# Patient Record
Sex: FEMALE | Race: BLACK OR AFRICAN AMERICAN | Employment: OTHER | URBAN - METROPOLITAN AREA
[De-identification: names, ages, dates, MRNs, and addresses within clinical notes are randomized per-mention and may not be internally consistent; named-entity substitution may affect disease eponyms.]

---

## 2017-06-27 ENCOUNTER — HOSPITAL ENCOUNTER (OUTPATIENT)
Dept: PHYSICAL THERAPY | Age: 72
Discharge: HOME | End: 2017-06-27
Attending: INTERNAL MEDICINE
Payer: MEDICARE

## 2017-06-27 PROCEDURE — 97110 THERAPEUTIC EXERCISES: CPT

## 2017-06-27 PROCEDURE — G8979 MOBILITY GOAL STATUS: HCPCS

## 2017-06-27 PROCEDURE — 97162 PT EVAL MOD COMPLEX 30 MIN: CPT

## 2017-06-27 PROCEDURE — 97112 NEUROMUSCULAR REEDUCATION: CPT

## 2017-06-27 PROCEDURE — G8978 MOBILITY CURRENT STATUS: HCPCS

## 2017-06-27 NOTE — PROGRESS NOTES
PT DAILY TREATMENT NOTE - [facility]     Patient Name: Asher Cutting  Date:2017  : 1945  [x]  Patient  Verified  Payor: Ranjith Webbalysia / Plan: [place] & [place] / Product Type: Medicare /    In time:3:02  Out time:4:00  Total Treatment Time (min): 58  Total Timed Codes (min): 25  1:1 Treatment Time ( [address] only): 62   Visit #: 1 of 8    Treatment Area: Muscle weakness (generalized) [M62.81]    SUBJECTIVE  Pain Level (0-10 scale): 0/10  Any medication changes, allergies to medications, adverse drug reactions, diagnosis change, or new procedure performed?: [x] No    [] Yes (see summary sheet for update)  Subjective functional status/changes:   [] No changes reported  The patient has a chief complaint of L knee pain, weakness, and difficulty with walking due to fatigue. OBJECTIVE  33 min [x]Eval                  []Re-Eval     15 min Therapeutic Exercise:  [x] See flow sheet :   Rationale: increase ROM and increase strength to improve the patients ability to improve ADL ease. 10 min Neuromuscular Re-education:  [x]  See flow sheet : FT, TUG, 30\" sit to stand   Rationale: increase ROM and increase strength  to improve the patients ability to improve ADL ease.     With   [] TE   [] TA   [] neuro   [] other: Patient Education: [x] Review HEP    [] Progressed/Changed HEP based on:   [] positioning   [] body mechanics   [] transfers   [] heat/ice application    [] other:      Other Objective/Functional Measures:  See IE    Pain Level (0-10 scale) post treatment: 0/10    ASSESSMENT/Changes in Function: See POC    Patient will continue to benefit from skilled PT services to modify and progress therapeutic interventions, address functional mobility deficits, address ROM deficits, address strength deficits, analyze and address soft tissue restrictions, analyze and cue movement patterns, analyze and modify body mechanics/ergonomics, assess and modify postural abnormalities and instruct in home and community integration to attain remaining goals. [x]  See Plan of Care  []  See progress note/recertification  []  See Discharge Summary         Progress towards goals / Updated goals:  Short Term Goals: To be accomplished in 2 weeks:                         1. The patient will be independent and compliant with HEP to maximize therapeutic benefit. 2. The patient will improve TUG time to 15\" in order to maximize ease of transfer and gait. Long Term Goals: To be accomplished in 4 weeks:                         1. The patient will improve 30\" sit to stands to 5 to maximize efficiency of transfers. 2. The patient will improve TUG to 13\" to improve efficiency of gait during ADLs. 3. The patient will improve FOTO score to 64 to maximize quality of life.                          4. The patient will display tandem stance without LOB to 30\" in order to reduce risk of falls.     PLAN  []  Upgrade activities as tolerated     [x]  Continue plan of care  []  Update interventions per flow sheet       []  Discharge due to:_  []  Other:_      Jennifer Alexis, PT 6/27/2017  5:42 PM    Future Appointments  Date Time Provider Ashu Donato   7/5/2017 12:00 PM MICHAEL LOPEZ [facility]   7/11/2017 11:30 AM Scheryl Pancake, PT [facility]   7/13/2017 11:30 AM Sharee Fus, PTA [facility]   7/18/2017 11:30 AM Scheryl Pancake, PT [facility]   7/20/2017 11:30 AM Alianza Fus, PTA [facility]   7/25/2017 11:30 AM Scheryl Pancake, PT [facility]   7/27/2017 11:30 AM Alianza Fus, PTA [facility]

## 2017-06-27 NOTE — PROGRESS NOTES
[facility]  [address]  [place], [address]  [phone] fax    Plan of Care/ Statement of Necessity for Physical Therapy Services    Patient name: Tej Jarvis Start of Care: 2017   Referral source: Jazminemicky, Not On File, MD : 1945    Medical Diagnosis: Muscle weakness (generalized) [M62.81]   Onset Date: Chronic    Treatment Diagnosis: Generalized muscle weakness   Prior Hospitalization: see medical history Provider#: [id number]   Medications: Verified on Patient summary List    Comorbidities: HTN, Hearing Impairment, Stroke, Bunionectomy, Heart Disease   Prior Level of Function: The patient states that she has had chronic difficulty with ambulation around home and short distances due to muscle fatigue and weakness. The Plan of Care and following information is based on the information from the initial evaluation. Assessment/ key information: The patient is a 67year old female with a chief complaint weakness that is noted when walking for any length of time. She uses a [facility] for her primary AD during ambulation in the home and community. She does note falls in her home, but have been against furniture up to his point and she notes that have not been severe. The patient has signs and symptoms consistent with generalized weakness as well as a risk of falls with associated impairments consisting of pain, decreased strength, and risk of falls. She will benefit from skilled PT in order to address the above impairments. Evaluation Complexity History MEDIUM  Complexity : 1-2 comorbidities / personal factors will impact the outcome/ POC ; Examination MEDIUM Complexity : 3 Standardized tests and measures addressing body structure, function, activity limitation and / or participation in recreation  ;Presentation MEDIUM Complexity : Evolving with changing characteristics  ; Clinical Decision Making MEDIUM Complexity : FOTO score of 26-74  Overall Complexity Rating: MEDIUM  Problem List: pain affecting function, decrease strength, impaired gait/ balance, decrease ADL/ functional abilitiies, decrease activity tolerance, decrease flexibility/ joint mobility and decrease transfer abilities   Treatment Plan may include any combination of the following: Therapeutic exercise, Therapeutic activities, Neuromuscular re-education, Physical agent/modality, Manual therapy and Patient education  Patient / Family readiness to learn indicated by: asking questions, trying to perform skills and interest  Persons(s) to be included in education: patient (P)  Barriers to Learning/Limitations: None  Patient Goal (s): improve balance  Patient Self Reported Health Status: fair  Rehabilitation Potential: fair    Short Term Goals: To be accomplished in 2 weeks:   1. The patient will be independent and compliant with HEP to maximize therapeutic benefit. 2. The patient will improve TUG time to 15\" in order to maximize ease of transfer and gait. Long Term Goals: To be accomplished in 4 weeks:   1. The patient will improve 30\" sit to stands to 5 to maximize efficiency of transfers. 2. The patient will improve TUG to 13\" to improve efficiency of gait during ADLs. 3. The patient will improve FOTO score to 64 to maximize quality of life. 4. The patient will display tandem stance without LOB to 30\" in order to reduce risk of falls. Frequency / Duration: Patient to be seen 2 times per week for 4 weeks.     Patient/ Caregiver education and instruction: Diagnosis, prognosis, self care, activity modification and exercises   [x]  Plan of care has been reviewed with PTA    G-Codes (GP)  Mobility   Current  CK= 40-59%   Goal  CJ= 35-78%    Medicare Cert period: 3/12/7966 - 8/27/2017    Bill Adan, PT 6/27/2017 4:56 PM    ________________________________________________________________________    I certify that the above Therapy Services are being furnished while the patient is under my care. I agree with the treatment plan and certify that this therapy is necessary.     [de-identified] Signature:____________________  Date:____________Time: _________    Please sign and return to [facility] [address]  [place], [facility] [address]  [phone] fax

## 2017-07-11 ENCOUNTER — HOSPITAL ENCOUNTER (OUTPATIENT)
Dept: PHYSICAL THERAPY | Age: 72
Discharge: HOME | End: 2017-07-11
Attending: INTERNAL MEDICINE
Payer: MEDICARE

## 2017-07-11 PROCEDURE — 97112 NEUROMUSCULAR REEDUCATION: CPT

## 2017-07-11 PROCEDURE — 97110 THERAPEUTIC EXERCISES: CPT

## 2017-07-11 NOTE — PROGRESS NOTES
PT DAILY TREATMENT NOTE - [facility]     Patient Name: Lencho Wayne  Date:2017  : 1945  [x]  Patient  Verified  Payor: Aleena  / Plan: [place] MEDICARE PART A & B / Product Type: Medicare /    In time:11:30  Out time:12:05  Total Treatment Time (min): 35  Total Timed Codes (min): 35  1:1 Treatment Time ([facility] only): 35   Visit #: 2 of 8    Treatment Area: Muscle weakness (generalized) [M62.81]    SUBJECTIVE  Pain Level (0-10 scale): 0/10  Any medication changes, allergies to medications, adverse drug reactions, diagnosis change, or new procedure performed?: [x] No    [] Yes (see summary sheet for update)  Subjective functional status/changes:   [] No changes reported  The patient states that she has been performing the exercises as home when she can.     OBJECTIVE    Modality rationale:  to improve the patients ability to    Min Type Additional Details    [] Estim:  []Unatt       []IFC  []Premod                        []Other:  []w/ice   []w/heat  Position:  Location:    [] Estim: []Att    []TENS instruct  []NMES                    []Other:  []w/US   []w/ice   []w/heat  Position:  Location:    []  Traction: [] Cervical       []Lumbar                       [] Prone          []Supine                       []Intermittent   []Continuous Lbs:  [] before manual  [] after manual    []  Ultrasound: []Continuous   [] Pulsed                           []1MHz   []3MHz W/cm2:  Location:    []  Iontophoresis with dexamethasone         Location: [] Take home patch   [] In clinic    []  Ice     []  heat  []  Ice massage  []  Laser   []  Anodyne Position:  Location:    []  Laser with stim  []  Other:  Position:  Location:    []  Vasopneumatic Device Pressure:       [] lo [] med [] hi   Temperature: [] lo [] med [] hi   [] Skin assessment post-treatment:  []intact []redness- no adverse reaction    []redness  adverse reaction:      min []Eval                  []Re-Eval       25 min Therapeutic Exercise:  [] See flow sheet : Rationale: increase ROM and increase strength to improve the patients ability to improve ADL ease     min Therapeutic Activity:  []  See flow sheet :   Rationale:   to improve the patients ability to      10 min Neuromuscular Re-education:  []  See flow sheet :   Rationale: improve coordination, improve balance and increase proprioception  to improve the patients ability to improve ADL ease. min Manual Therapy:     Rationale:  to      min Gait Training:  ___ feet with ___ device on level surfaces with ___ level of assist   Rationale: With   [] TE   [] TA   [] neuro   [] other: Patient Education: [x] Review HEP    [] Progressed/Changed HEP based on:   [] positioning   [] body mechanics   [] transfers   [] heat/ice application    [] other:      Other Objective/Functional Measures:   30\" sit to stand chair rise test: 6 times  Pt ambulated 500' with SPC; fatigued following exercises. Pain Level (0-10 scale) post treatment: 0/10    ASSESSMENT/Changes in Function: Good improvement with regard to transfer efficiency with fair compliance of HEP up to this point. Patient will continue to benefit from skilled PT services to modify and progress therapeutic interventions, address functional mobility deficits, address ROM deficits, address strength deficits, analyze and address soft tissue restrictions, analyze and cue movement patterns, analyze and modify body mechanics/ergonomics, assess and modify postural abnormalities, address imbalance/dizziness and instruct in home and community integration to attain remaining goals. []  See Plan of Care  []  See progress note/recertification  []  See Discharge Summary         Progress towards goals / Updated goals:  Short Term Goals: To be accomplished in 2 weeks:                         1. The patient will be independent and compliant with HEP to maximize therapeutic benefit. Mostly met - 7/11/2017                         2.  The patient will improve TUG time to 15\" in order to maximize ease of transfer and gait. Long Term Goals: To be accomplished in 4 weeks:                         1. The patient will improve 30\" sit to stands to 5 to maximize efficiency of transfers. Met 6 times 7/11/2017                         2. The patient will improve TUG to 13\" to improve efficiency of gait during ADLs.                        0. The patient will improve FOTO score to 64 to maximize quality of life.                         4. The patient will display tandem stance without LOB to 30\" in order to reduce risk of falls.     PLAN  []  Upgrade activities as tolerated     [x]  Continue plan of care  []  Update interventions per flow sheet       []  Discharge due to:_  []  Other:_      Livan Almeida, PT 7/11/2017  1:46 PM    Future Appointments  Date Time Provider Ashu Donato   7/13/2017 11:30 AM [facility]   7/18/2017 11:30 AM [facility]   7/20/2017 11:30 AM [facility], [facility]   7/25/2017 11:30 AM [facility]   7/27/2017 11:30 AM [facility]

## 2017-07-13 ENCOUNTER — HOSPITAL ENCOUNTER (OUTPATIENT)
Dept: PHYSICAL THERAPY | Age: 72
Discharge: HOME | End: 2017-07-13
Attending: INTERNAL MEDICINE
Payer: MEDICARE

## 2017-07-13 PROCEDURE — 97116 GAIT TRAINING THERAPY: CPT

## 2017-07-13 PROCEDURE — 97112 NEUROMUSCULAR REEDUCATION: CPT

## 2017-07-13 NOTE — PROGRESS NOTES
PT DAILY TREATMENT NOTE - [facility]     Patient Name: Maxime Matt  Date:2017  : 1945  [x]  Patient  Verified  Payor: Negro Horne / Plan: [place] MEDICARE PART A & B / Product Type: Medicare /    In time:11:55  Out time:12:24  Total Treatment Time (min): 29  Total Timed Codes (min): 29  1:1 Treatment Time ( [address] only): 29   Visit #: 3 of 8    Treatment Area: Muscle weakness (generalized) [M62.81]    SUBJECTIVE  Pain Level (0-10 scale): 0/10  Any medication changes, allergies to medications, adverse drug reactions, diagnosis change, or new procedure performed?: [x] No    [] Yes (see summary sheet for update)  Subjective functional status/changes:   [] No changes reported  Pt reports no pain. Pt reports no other complaints today. Pt reports compliance with HEP. OBJECTIVE    21 min Neuromuscular Re-education:  [x]  See flow sheet :   Rationale: improve coordination and improve balance  to improve the patients ability to tolerate functional activities. 8 min Gait Trainin  feet with no device on level surfaces with CGA level of assist   Rationale: With   [] TE   [] TA   [] neuro   [] other: Patient Education: [x] Review HEP    [] Progressed/Changed HEP based on:   [] positioning   [] body mechanics   [] transfers   [] heat/ice application    [] other:      Other Objective/Functional Measures: No LOB with standing balance and gait. Pain Level (0-10 scale) post treatment: 0/10    ASSESSMENT/Changes in Function: Pt demonstrates improved proprioception and functional mobility. Patient will continue to benefit from skilled PT services to modify and progress therapeutic interventions, address functional mobility deficits, address ROM deficits, address strength deficits, analyze and address soft tissue restrictions, analyze and cue movement patterns and analyze and modify body mechanics/ergonomics to attain remaining goals.      []  See Plan of Care  []  See progress note/recertification  []  See Discharge Summary         Progress towards goals / Updated goals:  Short Term Goals: To be accomplished in 2 weeks:                         1. The patient will be independent and compliant with HEP to maximize therapeutic benefit. Mostly met - 7/11/2017                         2. The patient will improve TUG time to 15\" in order to maximize ease of transfer and gait. Long Term Goals: To be accomplished in 4 weeks:                         1. The patient will improve 30\" sit to stands to 5 to maximize efficiency of transfers. Met 6 times 7/11/2017                         2. The patient will improve TUG to 13\" to improve efficiency of gait during ADLs.                        7. The patient will improve FOTO score to 64 to maximize quality of life.                         4. The patient will display tandem stance without LOB to 30\" in order to reduce risk of falls.     PLAN  []  Upgrade activities as tolerated     [x]  Continue plan of care  []  Update interventions per flow sheet       []  Discharge due to:_  []  Other:_      David Engle PTA 7/13/2017  12:03 PM    Future Appointments  Date Time Provider Ashu Donato   7/18/2017 11:30 AM Mihai Lazo PT [facility]   7/20/2017 11:30 AM [facility]   7/25/2017 11:30 AM [facility]   7/27/2017 11:30 AM David Engle PTA [facility]

## 2017-07-18 ENCOUNTER — HOSPITAL ENCOUNTER (OUTPATIENT)
Dept: PHYSICAL THERAPY | Age: 72
Discharge: HOME | End: 2017-07-18
Attending: INTERNAL MEDICINE
Payer: MEDICARE

## 2017-07-18 PROCEDURE — 97110 THERAPEUTIC EXERCISES: CPT

## 2017-07-18 PROCEDURE — 97112 NEUROMUSCULAR REEDUCATION: CPT

## 2017-07-18 NOTE — PROGRESS NOTES
PT DAILY TREATMENT NOTE - [facility]     Patient Name: Kurt King  Date:2017  : 1945  [x]  Patient  Verified  Payor: Jhoan Mcknight / Plan: [place] MEDICARE PART A & B / Product Type: Medicare /    In time:11:30  Out time:12:08  Total Treatment Time (min): 38  Total Timed Codes (min): 38  1:1 Treatment Time ( [address] only): 38   Visit #: 4 of 8    Treatment Area: Muscle weakness (generalized) [M62.81]    SUBJECTIVE  Pain Level (0-10 scale): 4/10  Any medication changes, allergies to medications, adverse drug reactions, diagnosis change, or new procedure performed?: [x] No    [] Yes (see summary sheet for update)  Subjective functional status/changes:   [] No changes reported  The patient states that she still is struggling with her balance. OBJECTIVE  10 min Therapeutic Exercise:  [x] See flow sheet :   Rationale: increase ROM and increase strength to improve the patients ability to improve ADL ease. 28 min Neuromuscular Re-education:  [x]  See flow sheet : balance exercises   Rationale: improve coordination, improve balance and increase proprioception  to improve the patients ability to improve ADL ease. With   [] TE   [] TA   [] neuro   [] other: Patient Education: [x] Review HEP    [] Progressed/Changed HEP based on:   [] positioning   [] body mechanics   [] transfers   [] heat/ice application    [] other:      Other Objective/Functional Measures:   TU\"  Progressed to 500' ambulation void of AD      Pain Level (0-10 scale) post treatment: 0/10    ASSESSMENT/Changes in Function: Met goals related to transfers and ambulation speed (TUG). The patient is progressing.     Patient will continue to benefit from skilled PT services to modify and progress therapeutic interventions, address functional mobility deficits, address ROM deficits, address strength deficits, analyze and address soft tissue restrictions, analyze and cue movement patterns, analyze and modify body mechanics/ergonomics, assess and modify postural abnormalities and instruct in home and community integration to attain remaining goals. []  See Plan of Care  []  See progress note/recertification  []  See Discharge Summary         Progress towards goals / Updated goals:  Short Term Goals: To be accomplished in 2 weeks:                         3. The patient will be independent and compliant with HEP to maximize therapeutic benefit. Mostly met - 7/11/2017                         2. The patient will improve TUG time to 15\" in order to maximize ease of transfer and gait. Long Term Goals: To be accomplished in 4 weeks:                         1. The patient will improve 30\" sit to stands to 5 to maximize efficiency of transfers.  Met 6 times 7/11/2017                         2. The patient will improve TUG to 13\" to improve efficiency of gait during ADLs. Not met 13\" 7/18/2017                         3. The patient will improve FOTO score to 64 to maximize quality of life.                          4. The patient will display tandem stance without LOB to 30\" in order to reduce risk of falls.      PLAN  []  Upgrade activities as tolerated     [x]  Continue plan of care  []  Update interventions per flow sheet       []  Discharge due to:_  []  Other:_      Suman Richard PT 7/18/2017  1:24 PM    Future Appointments  Date Time Provider Ashu Donato   7/20/2017 11:30 AM Florence Bravo PTA [facility]   7/25/2017 11:30 AM Suman Richard PT [facility]   7/27/2017 11:30 AM Florence Bravo PTA [facility]

## 2017-07-20 ENCOUNTER — HOSPITAL ENCOUNTER (OUTPATIENT)
Dept: PHYSICAL THERAPY | Age: 72
Discharge: HOME | End: 2017-07-20
Attending: INTERNAL MEDICINE
Payer: MEDICARE

## 2017-07-20 PROCEDURE — 97112 NEUROMUSCULAR REEDUCATION: CPT

## 2017-07-20 PROCEDURE — 97110 THERAPEUTIC EXERCISES: CPT

## 2017-07-20 NOTE — PROGRESS NOTES
PT DAILY TREATMENT NOTE - [facility]     Patient Name: Frannie Hilario  Date:2017  : 1945  [x]  Patient  Verified  Payor: [place] MEDICARE / Plan: [place] MEDICARE PART A & B / Product Type: Medicare /    In time:11:30  Out time:12:00  Total Treatment Time (min): 30  Total Timed Codes (min): 30  1:1 Treatment Time ( [address] only): 30   Visit #: 5 of 8    Treatment Area: Muscle weakness (generalized) [M62.81]    SUBJECTIVE  Pain Level (0-10 scale): 3-4/10  Any medication changes, allergies to medications, adverse drug reactions, diagnosis change, or new procedure performed?: [x] No    [] Yes (see summary sheet for update)  Subjective functional status/changes:   [] No changes reported  Pt reports no new complaints. Pt reports compliance with HEP. OBJECTIVE    20 min Therapeutic Exercise:  [] See flow sheet :   Rationale: increase ROM and increase strength to improve the patients ability to tolerate ADLs. 10 min Neuromuscular Re-education:  []  See flow sheet :   Rationale: increase strength, improve coordination and increase proprioception  to improve the patients ability to tolerate ADLs          With   [] TE   [] TA   [] neuro   [] other: Patient Education: [x] Review HEP    [] Progressed/Changed HEP based on:   [] positioning   [] body mechanics   [] transfers   [] heat/ice application    [] other:      Other Objective/Functional Measures: Pt demonstrates no gait deviations when ambulating without AD required. Pain Level (0-10 scale) post treatment: 0/10    ASSESSMENT/Changes in Function: Pt demonstrates improved gait mechanics and proprioception but continues to demonstrate a lack of motivation.      Patient will continue to benefit from skilled PT services to modify and progress therapeutic interventions, address functional mobility deficits, address ROM deficits, address strength deficits, analyze and address soft tissue restrictions, analyze and cue movement patterns and analyze and modify body mechanics/ergonomics to attain remaining goals. []  See Plan of Care  []  See progress note/recertification  []  See Discharge Summary         Progress towards goals / Updated goals:  Short Term Goals: To be accomplished in 2 weeks:                         2. The patient will be independent and compliant with HEP to maximize therapeutic benefit. Mostly met - 7/11/2017                         2. The patient will improve TUG time to 15\" in order to maximize ease of transfer and gait. Long Term Goals: To be accomplished in 4 weeks:                         1. The patient will improve 30\" sit to stands to 5 to maximize efficiency of transfers.  Met 6 times 7/11/2017                         2. The patient will improve TUG to 13\" to improve efficiency of gait during ADLs. Not met 13\" 7/18/2017                         3. The patient will improve FOTO score to 64 to maximize quality of life.                          4. The patient will display tandem stance without LOB to 30\" in order to reduce risk of falls.      PLAN  []  Upgrade activities as tolerated     [x]  Continue plan of care  []  Update interventions per flow sheet       []  Discharge due to:_  []  Other:_      Shannon Julien PTA 7/20/2017  11:49 AM    Future Appointments  Date Time Provider Ashu Donato   7/25/2017 11:30 AM Madelyn Pierce, PT [facility]   7/27/2017 11:30 AM [facility]

## 2017-07-25 ENCOUNTER — HOSPITAL ENCOUNTER (OUTPATIENT)
Dept: PHYSICAL THERAPY | Age: 72
Discharge: HOME | End: 2017-07-25
Attending: INTERNAL MEDICINE
Payer: MEDICARE

## 2017-07-25 PROCEDURE — 97112 NEUROMUSCULAR REEDUCATION: CPT

## 2017-07-25 PROCEDURE — 97110 THERAPEUTIC EXERCISES: CPT

## 2017-07-27 ENCOUNTER — HOSPITAL ENCOUNTER (OUTPATIENT)
Dept: PHYSICAL THERAPY | Age: 72
Discharge: HOME | End: 2017-07-27
Attending: INTERNAL MEDICINE
Payer: MEDICARE

## 2017-07-27 PROCEDURE — G8979 MOBILITY GOAL STATUS: HCPCS

## 2017-07-27 PROCEDURE — 97116 GAIT TRAINING THERAPY: CPT

## 2017-07-27 PROCEDURE — G8980 MOBILITY D/C STATUS: HCPCS

## 2017-07-27 PROCEDURE — 97110 THERAPEUTIC EXERCISES: CPT

## 2017-07-27 NOTE — PROGRESS NOTES
PT DAILY TREATMENT NOTE - [facility]     Patient Name: Claudette Mail  Date:2017  : 1945  [x]  Patient  Verified  Payor: [address] / Plan: [place] & [place] / Product Type: Medicare /    In time:11:30  Out time:12:00  Total Treatment Time (min): 30  Total Timed Codes (min): 30  1:1 Treatment Time ([address] only): 30   Visit #: 7 of 8    Treatment Area: Muscle weakness (generalized) [M62.81]    SUBJECTIVE  Pain Level (0-10 scale): 0/10  Any medication changes, allergies to medications, adverse drug reactions, diagnosis change, or new procedure performed?: [x] No    [] Yes (see summary sheet for update)  Subjective functional status/changes:   [] No changes reported  Pt reports no new complaints. Pt reports having a cold today. OBJECTIVE    22 min Therapeutic Exercise:  [x] See flow sheet :   Rationale: increase ROM and increase strength to improve the patients ability to tolerate ADLs. 8 min Gait Trainin feet with no device on level surfaces with SBA level of assist   Rationale: With   [] TE   [] TA   [] neuro   [] other: Patient Education: [x] Review HEP    [] Progressed/Changed HEP based on:   [] positioning   [] body mechanics   [] transfers   [] heat/ice application    [] other:      Other Objective/Functional Measures: tandem stance performed with 2 LOB but was able to self correct. Pain Level (0-10 scale) post treatment: 0/10    ASSESSMENT/Changes in Function: Pt has met or made significant progress toward all functional goals. Issued and reviewed DC instructions with patient. []  See Plan of Care  []  See progress note/recertification  [x]  See Discharge Summary         Progress towards goals / Updated goals:  Short Term Goals: To be accomplished in 2 weeks:                         9. The patient will be independent and compliant with HEP to maximize therapeutic benefit. Mostly met - 2017                         2.  The patient will improve TUG time to 15\" in order to maximize ease of transfer and gait. Met - 13\" 7/18/017  Long Term Goals: To be accomplished in 4 weeks:                         9. The patient will improve 30\" sit to stands to 5 to maximize efficiency of transfers.  Met 6 times 7/11/2017                         2. The patient will improve TUG to 13\" to improve efficiency of gait during ADLs. Met 13\" 7/18/2017                         3. The patient will improve FOTO score to 64 to maximize quality of life. Progressed to 56 7/25/2017                         4. The patient will display tandem stance without LOB to 30\" in order to reduce risk of falls. - near met. 2 LOB with tandem stance patient was able to self correct. 07/27/17    PLAN  []  Upgrade activities as tolerated     [x]  Continue plan of care  []  Update interventions per flow sheet       []  Discharge due to:_  []  Other:_      Freya Meade, PTA 7/27/2017  11:49 AM    No future appointments.

## 2017-08-08 NOTE — PROGRESS NOTES
[facility]  [address]  [place], [address]  [phone] fax    Physical Therapy Discharge Summary  Patient name: Gerhardt Farber Start of Care: 2017   Referral source: [facility], Not On File, MD : 1945                          Medical Diagnosis: Muscle weakness (generalized) [M62.81] Onset Date: Chronic                          Treatment Diagnosis: Generalized muscle weakness   Prior Hospitalization: see medical history Provider#: [id number]   Medications: Verified on Patient summary List    Comorbidities: HTN, Hearing Impairment, Stroke, Bunionectomy, Heart Disease   Prior Level of Function: The patient states that she has had chronic difficulty with ambulation around home and short distances due to muscle fatigue and weakness. Visits from Start of Care: 7    Missed Visits: 2  Reporting Period : 2017 to 2017    Summary of Care:    G-Codes (GP)  Mobility    Goal  CK= 40-59%  D/C  CK= 40-59%      The severity rating is based on clinical judgment and the FOTO score. ASSESSMENT/RECOMMENDATIONS:  Pt has met or made significant progress toward all functional goals. Issued and reviewed DC instructions with patient.      [x]Discontinue therapy: [x]Patient has reached or is progressing toward set goals      []Patient is non-compliant or has abdicated      []Due to lack of appreciable progress towards set [address], PT 2017 7:16 AM

## 2019-03-22 ENCOUNTER — HOSPITAL ENCOUNTER (EMERGENCY)
Age: 74
Discharge: HOME | End: 2019-03-22
Attending: EMERGENCY MEDICINE
Payer: MEDICARE

## 2019-03-22 ENCOUNTER — APPOINTMENT (OUTPATIENT)
Dept: GENERAL RADIOLOGY | Age: 74
End: 2019-03-22
Attending: NURSE PRACTITIONER
Payer: MEDICARE

## 2019-03-22 VITALS
OXYGEN SATURATION: 98 % | WEIGHT: 200 LBS | RESPIRATION RATE: 18 BRPM | DIASTOLIC BLOOD PRESSURE: 108 MMHG | HEART RATE: 98 BPM | SYSTOLIC BLOOD PRESSURE: 164 MMHG | BODY MASS INDEX: 32.14 KG/M2 | HEIGHT: 66 IN | TEMPERATURE: 98.3 F

## 2019-03-22 DIAGNOSIS — R03.0 ELEVATED BLOOD PRESSURE READING: ICD-10-CM

## 2019-03-22 DIAGNOSIS — M25.462 EFFUSION OF LEFT KNEE: ICD-10-CM

## 2019-03-22 DIAGNOSIS — S89.92XA INJURY OF LEFT KNEE, INITIAL ENCOUNTER: Primary | ICD-10-CM

## 2019-03-22 PROCEDURE — 73562 X-RAY EXAM OF KNEE 3: CPT

## 2019-03-22 PROCEDURE — 74011250637 HC RX REV CODE- 250/637: Performed by: NURSE PRACTITIONER

## 2019-03-22 PROCEDURE — 99284 EMERGENCY DEPT VISIT MOD MDM: CPT

## 2019-03-22 RX ORDER — ACETAMINOPHEN 500 MG
500 TABLET ORAL
Status: COMPLETED | OUTPATIENT
Start: 2019-03-22 | End: 2019-03-22

## 2019-03-22 RX ORDER — ACETAMINOPHEN 500 MG
500 TABLET ORAL
Qty: 20 TAB | Refills: 0 | Status: SHIPPED | OUTPATIENT
Start: 2019-03-22 | End: 2020-04-13

## 2019-03-22 RX ADMIN — ACETAMINOPHEN 500 MG: 500 TABLET ORAL at 18:09

## 2019-03-22 NOTE — ED PROVIDER NOTES
EMERGENCY DEPARTMENT HISTORY AND PHYSICAL EXAM 
 
5:12 PM 
 
 
Date: 3/22/2019 Patient Name: Reta Solo History of Presenting Illness Chief Complaint Patient presents with  Knee Pain History Provided By: Patient Additional History (Context): 5:12 PM Reta Solo is a 75 y.o. female with h/o HTN and HLD who presents to ED complaining of left knee pain onset 2 days. Patient states that today she was trying to get out of the tub and had trouble bearing weight on the left leg due to the pain. She reports having knee pain and swelling for 2 days since a mechanical fall. No other concerns or symptoms at this time. PCP: Adamaris Monique MD 
 
Chief Complaint: knee pain Duration:  Days Timing:  Worsening Location: left knee Quality: not reported Severity: not reported Modifying Factors: exacerbated with bearing weight Associated Symptoms: knee swelling Current Outpatient Medications Medication Sig Dispense Refill  acetaminophen (TYLENOL EXTRA STRENGTH) 500 mg tablet Take 1 Tab by mouth every six (6) hours as needed for Pain. 20 Tab 0  
 lisinopril (PRINIVIL, ZESTRIL) 40 mg tablet TAKE ONE TABLET BY MOUTH EVERY DAY 90 Tab 3  
 amLODIPine (NORVASC) 10 mg tablet Take 1 Tab by mouth daily. 90 Tab 3  
 apixaban (ELIQUIS) 5 mg tablet Take 1 Tab by mouth two (2) times a day. 180 Tab 3  carvedilol (COREG) 25 mg tablet Take 1 Tab by mouth two (2) times daily (with meals). 180 Tab 3  
 doxazosin (CARDURA) 8 mg tablet Take 1 Tab by mouth two (2) times a day. 180 Tab 6  
 rosuvastatin (CRESTOR) 5 mg tablet Take 1 Tab by mouth nightly. 90 Tab 3  
 aspirin delayed-release (ASPIRIN LOW DOSE) 81 mg tablet Take 1 Tab by mouth daily. 100 Tab 2 Past History Past Medical History: 
Past Medical History:  
Diagnosis Date  Atrial fibrillation ([address]   
 10/11 per Dr Stuart Hernandez note; converted to [place] and of Mult[place] for 3 months already and staying in [place]  
  Cerebrovascular accident ([facility] [address] 10/2011  
 left mu; improved almost completely  Essential hypertension  Essential hypertension, benign  Hard of hearing 3/20/2015  Other and unspecified hyperlipidemia LDLs are at goal, triglycerides are at goal, HDLs are at goal  
 
 
Past Surgical History: 
Past Surgical History:  
Procedure Laterality Date  HX HYSTERECTOMY    
 total  
 
 
Family History: 
Family History Problem Relation Age of Onset  High Cholesterol Sister  Hypertension Other   
     family history of essential hypertension  Heart Attack Neg Hx  Sudden Death Neg Hx Social History: 
Social History Tobacco Use  Smoking status: Never Smoker  Smokeless tobacco: Never Used Substance Use Topics  Alcohol use: No  
  Comment: occasional, quit  Drug use: No  
 
 
Allergies: Allergies Allergen Reactions  Tetanus Vaccines And Toxoid Swelling  Penicillins Swelling Review of Systems Review of Systems Constitutional: Negative for appetite change and fever. HENT: Negative for congestion, rhinorrhea and sore throat. Respiratory: Negative for cough, shortness of breath and wheezing. Cardiovascular: Negative for chest pain and leg swelling. Gastrointestinal: Negative for abdominal pain, constipation, diarrhea, nausea and vomiting. Genitourinary: Negative for dysuria. Musculoskeletal: Negative for arthralgias and back pain. Left knee pain Left knee swelling Skin: Negative for color change (no erythema). Neurological: Negative for dizziness, syncope and headaches. All other systems reviewed and are negative. Physical Exam  
 
Visit Vitals BP (!) 164/108 (BP 1 Location: Left arm, BP Patient Position: At rest) Pulse 98 Temp 98.3 °F (36.8 °C) Resp 18 Ht 5' 6\" (1.676 m) Wt 90.7 kg (200 lb) SpO2 98% BMI 32.28 kg/m² Physical Exam  
 Constitutional: She is oriented to person, place, and time. No distress. Patient is very hard of hearing Patient appears in no distress HENT:  
Mouth/Throat: Oropharynx is clear and moist.  
Cardiovascular: Normal rate, regular rhythm and intact distal pulses. Pulses: 
     Dorsalis pedis pulses are 2+ on the right side, and 2+ on the left side. Pulmonary/Chest: Effort normal and breath sounds normal. No respiratory distress. She has no wheezes. She has no rales. Musculoskeletal:  
     Left knee: She exhibits decreased range of motion, swelling and effusion. Tenderness found. Left ankle: She exhibits normal range of motion. No tenderness. Legs: 
Neurological: She is alert and oriented to person, place, and time. She exhibits normal muscle tone. Coordination normal.  
Skin: Skin is warm and dry. She is not diaphoretic. No erythema. Warmth or redness noted to left knee. Nursing note and vitals reviewed. \ 
 
 
Diagnostic Study Results Labs - No results found for this or any previous visit (from the past 12 hour(s)). Radiologic Studies -  
XR KNEE LT 3 V Final Result IMPRESSION:  
1. Moderate to large joint effusion with medial and lateral compartment  
osteoarthritis. Medical Decision Making It should be noted that I, No att. providers found will be the provider of record for this patient. I reviewed the vital signs, available nursing notes, past medical history, past surgical history, family history and social history. Vital Signs-Reviewed the patient's vital signs. Pulse Oximetry Analysis -  99% on room air, WNL Cardiac Monitor: 
Rate: 98 bpm  
 
Records Reviewed: Nursing Notes (Time of Review: 5:12 PM) 
 
ED Course: Progress Notes, Reevaluation, and Consults: 
 
Provider Notes (Medical Decision Making): MDM Number of Diagnoses or Management Options Effusion of left knee:  
Elevated blood pressure reading: Injury of left knee, initial encounter:  
Diagnosis management comments: DDX: Knee effusion, fracture, ligamentous injury Plan x-ray of left knee. No acute osseous abnormality noted of left knee. Ace wrap was applied for comfort. Patient is not a good candidate for a therapeutic arthrocentesis due to use of Eliquis. Patient to primary care doctor and orthopedic for further evaluation. Patient has no evidence of infection of left knee. There is no erythema or warmth noted. Patient's son is upset that I will not admit patient for left knee injury, he reports he is to go to work tonight and has no one to help her at home. Informed patient's son this is not admittable diagnosis. Patient's granddaughter will come and  patient from the ER and help her tonight. Patient was able to ambulate in department with minimal assistance of nursing staff prior to discharge. Patient educated to return the ED for any new or worsening symptoms. Amount and/or Complexity of Data Reviewed Tests in the radiology section of CPT®: ordered and reviewed Diagnosis Clinical Impression:  
1. Injury of left knee, initial encounter 2. Effusion of left knee 3. Elevated blood pressure reading Disposition: Discharge Follow-up Information Follow up With Specialties Details Why Contact Info Cristóbal Rachel MD Internal Medicine Schedule an appointment as soon as possible for a visit in 3 days Further evaluation [facility] [address] [facility] [address] [phone] Jd Bain MD Orthopedic Surgery Schedule an appointment as soon as possible for a visit in 3 days Further evaluation [address] [address] 
[phone] Discharge Medication List as of 3/22/2019  6:00 PM  
  
START taking these medications  Details  
acetaminophen (TYLENOL EXTRA STRENGTH) 500 mg tablet Take 1 Tab by mouth every six (6) hours as needed for Pain., Print, Disp-20 Tab, R-0  
  
  
CONTINUE these medications which have NOT CHANGED Details  
lisinopril (PRINIVIL, ZESTRIL) 40 mg tablet TAKE ONE TABLET BY MOUTH EVERY DAY, Normal, Disp-90 Tab, R-3  
  
amLODIPine (NORVASC) 10 mg tablet Take 1 Tab by mouth daily. , Normal, Disp-90 Tab, R-3  
  
apixaban (ELIQUIS) 5 mg tablet Take 1 Tab by mouth two (2) times a day., Normal, Disp-180 Tab, R-3  
  
carvedilol (COREG) 25 mg tablet Take 1 Tab by mouth two (2) times daily (with meals). , Normal, Disp-180 Tab, R-3  
  
doxazosin (CARDURA) 8 mg tablet Take 1 Tab by mouth two (2) times a day., Normal, Disp-180 Tab, R-6  
  
rosuvastatin (CRESTOR) 5 mg tablet Take 1 Tab by mouth nightly., Normal, Disp-90 Tab, R-3  
  
aspirin delayed-release (ASPIRIN LOW DOSE) 81 mg tablet Take 1 Tab by mouth daily. , Normal, Disp-100 Tab, R-2  
  
  
 
_______________________________ Scribe Attestation Cornelia Becerra acting as a scribe for and in the presence of Stephon Christian NP March 22, 2019 at 5:12 PM 
    
Provider Attestation:     
I personally performed the services described in the documentation, reviewed the documentation, as recorded by the scribe in my presence, and it accurately and completely records my words and actions. March 22, 2019 at 5:12 PM - Stephon Christian NP 
_______________________________

## 2020-01-20 ENCOUNTER — APPOINTMENT (OUTPATIENT)
Dept: CT IMAGING | Age: 75
End: 2020-01-20
Attending: EMERGENCY MEDICINE
Payer: MEDICARE

## 2020-01-20 ENCOUNTER — HOSPITAL ENCOUNTER (EMERGENCY)
Age: 75
Discharge: HOME | End: 2020-01-20
Attending: EMERGENCY MEDICINE
Payer: MEDICARE

## 2020-01-20 ENCOUNTER — APPOINTMENT (OUTPATIENT)
Dept: GENERAL RADIOLOGY | Age: 75
End: 2020-01-20
Attending: EMERGENCY MEDICINE
Payer: MEDICARE

## 2020-01-20 DIAGNOSIS — S83.92XA SPRAIN OF LEFT KNEE, UNSPECIFIED LIGAMENT, INITIAL ENCOUNTER: ICD-10-CM

## 2020-01-20 DIAGNOSIS — S70.12XA CONTUSION OF LEFT THIGH, INITIAL ENCOUNTER: Primary | ICD-10-CM

## 2020-01-20 PROCEDURE — 99284 EMERGENCY DEPT VISIT MOD MDM: CPT

## 2020-01-20 PROCEDURE — 73562 X-RAY EXAM OF KNEE 3: CPT

## 2020-01-20 PROCEDURE — 72192 CT PELVIS W/O DYE: CPT

## 2020-01-20 NOTE — ED PROVIDER NOTES
EMERGENCY DEPARTMENT HISTORY AND PHYSICAL EXAM    11:33 AM  Date: 1/20/2020  Patient Name: Antony Taylor    History of Presenting Illness     Chief Complaint   Patient presents with    Hip Pain        History Provided By: Patient    HPI: Antony Taylor is a 75 y.o. female with history multiple medical problems as below. Patient had a fall about a week ago and she was seen at [facility] for left hip pain after the fall. Had an hip x-ray which was negative. She was feeling better and was able to ambulate until this morning where she was unable to get out of bed due to pain in her left hip and knee. Denies other falls or other neuro symptoms. Location:  Severity:  Timing/course: Onset/Duration:     PCP: Ramses Mcintosh MD    Past History     Past Medical History:  Past Medical History:   Diagnosis Date    Atrial fibrillation ([facility] [address]     10/11 per Dr Emir Pascal note; converted to [place] and of [place] for 3 months already and staying in [place] [facility] [place]    Cerebrovascular accident [facility]) 10/2011    left mu; improved almost completely    Essential hypertension     Essential hypertension, benign     Hard of hearing 3/20/2015    Other and unspecified hyperlipidemia     LDLs are at goal, triglycerides are at goal, HDLs are at goal       Past Surgical History:  Past Surgical History:   Procedure Laterality Date    HX HYSTERECTOMY      total       Family History:  Family History   Problem Relation Age of Onset    High Cholesterol Sister     Hypertension Other         family history of essential hypertension    Heart Attack Neg Hx     Sudden Death Neg Hx        Social History:  Social History     Tobacco Use    Smoking status: Never Smoker    Smokeless tobacco: Never Used   Substance Use Topics    Alcohol use: No     Comment: occasional, quit     Drug use: No       Allergies:   Allergies   Allergen Reactions    Tetanus Vaccines And Toxoid Swelling    Penicillins Swelling       Review of Systems   Review of Systems Musculoskeletal: Positive for arthralgias and joint swelling. All other systems reviewed and are negative. Physical Exam     No data found. Physical Exam  Vitals signs and nursing note reviewed. Constitutional:       Appearance: Normal appearance. HENT:      Head: Normocephalic and atraumatic. Eyes:      Extraocular Movements: Extraocular movements intact. Neck:      Musculoskeletal: Neck supple. Cardiovascular:      Rate and Rhythm: Normal rate. Pulses: Normal pulses. Pulmonary:      Effort: Pulmonary effort is normal. No respiratory distress. Musculoskeletal:      Left hip: She exhibits tenderness. Left knee: She exhibits swelling. She exhibits no erythema. Tenderness found. Skin:     General: Skin is warm and dry. Neurological:      General: No focal deficit present. Mental Status: She is alert and oriented to person, place, and time. Psychiatric:         Mood and Affect: Mood normal.         Behavior: Behavior normal.         Diagnostic Study Results     Labs -  No results found for this or any previous visit (from the past 12 hour(s)). Radiologic Studies -   Xr Knee Lt 3 V    Result Date: 1/20/2020  IMPRESSION: Osteopenia without evidence of acute fracture. Large knee joint effusion. Mild tricompartmental osteoarthrosis. Medical Decision Making     ED Course: Progress Notes, Reevaluation, and Consults:    11:33 AM Initial assessment performed. The patients presenting problems have been discussed, and they/their family are in agreement with the care plan formulated and outlined with them. I have encouraged them to ask questions as they arise throughout their visit. Provider Notes (Medical Decision Making): 69-year-old female presenting with left hip and knee pain after fall. Patient had a negative hip x-ray however she could possibly still have a pelvic fracture or hip fracture that was not obvious on the x-ray.   Will get a pelvic CT to evaluate bony structures. Procedures:     Critical Care Time:     Vital Signs-Reviewed the patient's vital signs. Reviewed pt's pulse ox reading. EKG: Interpreted by the EP. Time Interpreted:    Rate:    Rhythm:    Interpretation:   Comparison:     Records Reviewed: Nursing Notes (Time of Review: 11:33 AM)  -I am the first provider for this patient.  -I reviewed the vital signs, available nursing notes, past medical history, past surgical history, family history and social history. Current Outpatient Medications   Medication Sig Dispense Refill    acetaminophen (TYLENOL EXTRA STRENGTH) 500 mg tablet Take 1 Tab by mouth every six (6) hours as needed for Pain. 20 Tab 0    lisinopril (PRINIVIL, ZESTRIL) 40 mg tablet TAKE ONE TABLET BY MOUTH EVERY DAY 90 Tab 3    amLODIPine (NORVASC) 10 mg tablet Take 1 Tab by mouth daily. 90 Tab 3    apixaban (ELIQUIS) 5 mg tablet Take 1 Tab by mouth two (2) times a day. 180 Tab 3    carvedilol (COREG) 25 mg tablet Take 1 Tab by mouth two (2) times daily (with meals). 180 Tab 3    doxazosin (CARDURA) 8 mg tablet Take 1 Tab by mouth two (2) times a day. 180 Tab 6    rosuvastatin (CRESTOR) 5 mg tablet Take 1 Tab by mouth nightly. 90 Tab 3    aspirin delayed-release (ASPIRIN LOW DOSE) 81 mg tablet Take 1 Tab by mouth daily. 100 Tab 2        Clinical Impression     Clinical Impression: No diagnosis found. Disposition: dc  Results were discussed with the patient and her son and they agreed on the plan of discharge with outpatient follow-up and I had instructed the patient that she should probably use a walker for the next few days until her knee feels better with avoiding weightbearing on the left knee. This note was dictated utilizing voice recognition software which may lead to typographical errors. I apologize in advance if the situation occurs. If questions arise please do not hesitate to contact me or call our department.     Flip Medel MD  11:33 AM

## 2020-01-20 NOTE — ED TRIAGE NOTES
Pt arrived to ED via EMS for c/o left hip pain. Family reports she was stuck on couch all night and unable to ambulate. Seen here on 1/18/2020 for fall and knee pain.

## 2020-01-20 NOTE — DISCHARGE INSTRUCTIONS
Patient Education        Strain or Sprain: Care Instructions  Your Care Instructions    A strain happens when you overstretch, or pull, a muscle. A sprain occurs when you stretch or tear a ligament, the tough tissue that connects one bone to another. These problems can happen when you exercise or lift something or when you are in an accident. Rest and other home care can help strains and sprains heal.  The doctor has checked you carefully, but problems can develop later. If you notice any problems or new symptoms,  get medical treatment right away. Follow-up care is a key part of your treatment and safety. Be sure to make and go to all appointments, and call your doctor if you are having problems. It's also a good idea to know your test results and keep a list of the medicines you take. How can you care for yourself at home? · If your doctor gave you a sling, splint, brace, or immobilizer, use it exactly as directed. · Rest the strained or sprained area, and follow your doctor's advice about when you can be active again. · Put ice or a cold pack on the sore area for 10 to 20 minutes at a time to stop swelling. Try this every 1 to 2 hours for 3 days (when you are awake) or until the swelling goes down. Put a thin cloth between the ice pack and your skin. Keep your splint or brace dry. · Prop up a sore arm or leg on a pillow when you ice it or anytime you sit or lie down. Try to keep it higher than the level of your heart. This will help reduce swelling. · Take pain medicines exactly as directed. ? If the doctor gave you a prescription medicine for pain, take it as prescribed. ? If you are not taking a prescription pain medicine, ask your doctor if you can take an over-the-counter medicine. · Do exercises as directed by your doctor or physical therapist.  · Return to your usual level of activity slowly. · Do not do anything that makes the pain worse. When should you call for help?   Call your doctor now or seek immediate medical care if:    · You have severe or increasing pain.     · You have tingling, weakness, or numbness in the area.     · The area turns cold or changes color.     · Your cast or splint feels too tight.     · You have symptoms of a blood clot, such as:  ? Pain in your calf, back of the knee, thigh, or groin. ? Redness and swelling in your leg or groin.     · You cannot move the strained part of your body.    Watch closely for changes in your health, and be sure to contact your doctor if:    · You do not get better as expected. Where can you learn more? Go to http://susan-sumi.info/. Enter J281 in the search box to learn more about \"Strain or Sprain: Care Instructions. \"  Current as of: June 26, 2019  Content Version: 12.2  © 0469-9999 [email or web address]. Care instructions adapted under license by [employer] (which disclaims liability or warranty for this information). If you have questions about a medical condition or this instruction, always ask your healthcare professional. [place] [address] any warranty or liability for your use of this information. Patient Education        Learning About RICE (Rest, Ice, Compression, and Elevation)  What is RICE? RICE is a way to care for an injury. RICE helps relieve pain and swelling. It may also help with healing and flexibility. RICE stands for:  · Rest and protect the injured or sore area. · Ice or a cold pack used as soon as possible. · Compression, or wrapping the injured or sore area with an elastic bandage. · Elevation (propping up) the injured or sore area. How do you do RICE? You can use RICE for home treatment when you have general aches and pains or after an injury or surgery. Rest  · Do not put weight on the injury for at least 24 to 48 hours. · Use crutches for a badly sprained knee or ankle. · Support a sprained wrist, elbow, or shoulder with a sling.   Ice  · Put ice or a cold pack on the injury right away to reduce pain and swelling. Frozen vegetables will also work as an ice pack. Put a thin cloth between the ice or cold pack and your skin. The cloth protects the injured area from getting too cold. · Use ice for 10 to 15 minutes at a time for the first 48 to 72 hours. Compression  · Use compression for sprains, strains, and surgeries of the arms and legs. · Wrap the injured area with an elastic bandage or compression sleeve to reduce swelling. · Don't wrap it too tightly. If the area below it feels numb, tingles, or feels cool, loosen the wrap. Elevation  · Use elevation for areas of the body that can be propped up, such as arms and legs. · Prop up the injured area on pillows whenever you use ice. Keep it propped up anytime you sit or lie down. · Try to keep the injured area at or above the level of your heart. This will help reduce swelling and bruising. Where can you learn more? Go to http://susan-sumi.info/. Enter R983 in the search box to learn more about \"Learning About RICE (Rest, Ice, Compression, and Elevation). \"  Current as of: June 26, 2019  Content Version: 12.2  © 6936-3030 [employer]. Care instructions adapted under license by [employer] (which disclaims liability or warranty for this information). If you have questions about a medical condition or this instruction, always ask your healthcare professional. [place] [address] any warranty or liability for your use of this information.

## 2020-04-13 ENCOUNTER — APPOINTMENT (OUTPATIENT)
Dept: GENERAL RADIOLOGY | Age: 75
End: 2020-04-13
Attending: EMERGENCY MEDICINE
Payer: MEDICARE

## 2020-04-13 ENCOUNTER — HOSPITAL ENCOUNTER (EMERGENCY)
Age: 75
Discharge: HOME | End: 2020-04-13
Attending: EMERGENCY MEDICINE
Payer: MEDICARE

## 2020-04-13 VITALS
TEMPERATURE: 98.7 F | WEIGHT: 190 LBS | OXYGEN SATURATION: 100 % | HEART RATE: 94 BPM | SYSTOLIC BLOOD PRESSURE: 152 MMHG | BODY MASS INDEX: 30.53 KG/M2 | HEIGHT: 66 IN | DIASTOLIC BLOOD PRESSURE: 114 MMHG

## 2020-04-13 DIAGNOSIS — R77.8 ELEVATED TROPONIN: ICD-10-CM

## 2020-04-13 DIAGNOSIS — R94.31 ABNORMAL EKG: ICD-10-CM

## 2020-04-13 DIAGNOSIS — I50.9 ACUTE CONGESTIVE HEART FAILURE, UNSPECIFIED HEART FAILURE TYPE (HCC): Primary | ICD-10-CM

## 2020-04-13 DIAGNOSIS — N18.9 CHRONIC KIDNEY DISEASE, UNSPECIFIED CKD STAGE: ICD-10-CM

## 2020-04-13 DIAGNOSIS — R60.9 PERIPHERAL EDEMA: ICD-10-CM

## 2020-04-13 LAB
ALBUMIN SERPL-MCNC: 3 G/DL (ref 3.4–5)
ALBUMIN/GLOB SERPL: 0.7 {RATIO} (ref 0.8–1.7)
ALP SERPL-CCNC: 86 U/L (ref 45–117)
ALT SERPL-CCNC: 27 U/L (ref 13–56)
ANION GAP SERPL CALC-SCNC: 8 MMOL/L (ref 3–18)
AST SERPL-CCNC: 45 U/L (ref 10–38)
ATRIAL RATE: 288 BPM
BASOPHILS # BLD: 0 K/UL (ref 0–0.1)
BASOPHILS NFR BLD: 0 % (ref 0–2)
BILIRUB SERPL-MCNC: 0.8 MG/DL (ref 0.2–1)
BNP SERPL-MCNC: 4554 PG/ML (ref 0–1800)
BUN SERPL-MCNC: 25 MG/DL (ref 7–18)
BUN/CREAT SERPL: 15 (ref 12–20)
CALCIUM SERPL-MCNC: 8.4 MG/DL (ref 8.5–10.1)
CALCULATED R AXIS, ECG10: -49 DEGREES
CALCULATED T AXIS, ECG11: 142 DEGREES
CHLORIDE SERPL-SCNC: 107 MMOL/L (ref 100–111)
CK MB CFR SERPL CALC: 1.4 % (ref 0–4)
CK MB SERPL-MCNC: 1.4 NG/ML (ref 5–25)
CK SERPL-CCNC: 98 U/L (ref 26–192)
CO2 SERPL-SCNC: 25 MMOL/L (ref 21–32)
CREAT SERPL-MCNC: 1.71 MG/DL (ref 0.6–1.3)
DIAGNOSIS, 93000: NORMAL
DIFFERENTIAL METHOD BLD: ABNORMAL
EOSINOPHIL # BLD: 0.1 K/UL (ref 0–0.4)
EOSINOPHIL NFR BLD: 2 % (ref 0–5)
ERYTHROCYTE [DISTWIDTH] IN BLOOD BY AUTOMATED COUNT: 17.6 % (ref 11.6–14.5)
GLOBULIN SER CALC-MCNC: 4.3 G/DL (ref 2–4)
GLUCOSE SERPL-MCNC: 109 MG/DL (ref 74–99)
HCT VFR BLD AUTO: 37.9 % (ref 35–45)
HGB BLD-MCNC: 12.5 G/DL (ref 12–16)
LYMPHOCYTES # BLD: 1.6 K/UL (ref 0.9–3.6)
LYMPHOCYTES NFR BLD: 31 % (ref 21–52)
MCH RBC QN AUTO: 29.3 PG (ref 24–34)
MCHC RBC AUTO-ENTMCNC: 33 G/DL (ref 31–37)
MCV RBC AUTO: 89 FL (ref 74–97)
MONOCYTES # BLD: 0.6 K/UL (ref 0.05–1.2)
MONOCYTES NFR BLD: 11 % (ref 3–10)
NEUTS SEG # BLD: 2.9 K/UL (ref 1.8–8)
NEUTS SEG NFR BLD: 56 % (ref 40–73)
PLATELET # BLD AUTO: 262 K/UL (ref 135–420)
PMV BLD AUTO: 12.8 FL (ref 9.2–11.8)
POTASSIUM SERPL-SCNC: 5.1 MMOL/L (ref 3.5–5.5)
PROT SERPL-MCNC: 7.3 G/DL (ref 6.4–8.2)
Q-T INTERVAL, ECG07: 410 MS
QRS DURATION, ECG06: 122 MS
QTC CALCULATION (BEZET), ECG08: 498 MS
RBC # BLD AUTO: 4.26 M/UL (ref 4.2–5.3)
SODIUM SERPL-SCNC: 140 MMOL/L (ref 136–145)
TROPONIN I SERPL-MCNC: 0.08 NG/ML (ref 0–0.04)
TROPONIN I SERPL-MCNC: 0.1 NG/ML (ref 0–0.04)
VENTRICULAR RATE, ECG03: 89 BPM
WBC # BLD AUTO: 5.1 K/UL (ref 4.6–13.2)

## 2020-04-13 PROCEDURE — 80053 COMPREHEN METABOLIC PANEL: CPT

## 2020-04-13 PROCEDURE — 85025 COMPLETE CBC W/AUTO DIFF WBC: CPT

## 2020-04-13 PROCEDURE — 84484 ASSAY OF TROPONIN QUANT: CPT

## 2020-04-13 PROCEDURE — 82550 ASSAY OF CK (CPK): CPT

## 2020-04-13 PROCEDURE — 93005 ELECTROCARDIOGRAM TRACING: CPT

## 2020-04-13 PROCEDURE — 83880 ASSAY OF NATRIURETIC PEPTIDE: CPT

## 2020-04-13 PROCEDURE — 71045 X-RAY EXAM CHEST 1 VIEW: CPT

## 2020-04-13 PROCEDURE — 99285 EMERGENCY DEPT VISIT HI MDM: CPT

## 2020-04-13 PROCEDURE — 94761 N-INVAS EAR/PLS OXIMETRY MLT: CPT

## 2020-04-13 RX ORDER — AMLODIPINE BESYLATE 10 MG/1
10 TABLET ORAL DAILY
Qty: 90 TAB | Refills: 3 | Status: SHIPPED | OUTPATIENT
Start: 2020-04-13 | End: 2020-04-20

## 2020-04-13 RX ORDER — FUROSEMIDE 10 MG/ML
40 INJECTION INTRAMUSCULAR; INTRAVENOUS
Status: DISCONTINUED | OUTPATIENT
Start: 2020-04-13 | End: 2020-04-13 | Stop reason: HOSPADM

## 2020-04-13 RX ORDER — FUROSEMIDE 40 MG/1
40 TABLET ORAL DAILY
Qty: 7 TAB | Refills: 0 | Status: SHIPPED | OUTPATIENT
Start: 2020-04-13 | End: 2020-04-20

## 2020-04-13 RX ORDER — CARVEDILOL 25 MG/1
25 TABLET ORAL 2 TIMES DAILY WITH MEALS
Qty: 14 TAB | Refills: 0 | Status: SHIPPED | OUTPATIENT
Start: 2020-04-13 | End: 2020-04-20

## 2020-04-13 NOTE — ED TRIAGE NOTES
Pt arrived from home via EMS A/O x 4 with c/o SOB. Pt does not wear O2 at home and did mention being out of her medication (names unknown, no list present) x 2 weeks. Pt is ambulatory and walked to stretcher, per medics and is on 3L NC. No O2 sats reported.

## 2020-04-13 NOTE — DISCHARGE INSTRUCTIONS
Patient Education        Heart Failure: Care Instructions  Your Care Instructions    Heart failure occurs when your heart does not pump as much blood as the body needs. Failure does not mean that the heart has stopped pumping but rather that it is not pumping as well as it should. Over time, this causes fluid buildup in your lungs and other parts of your body. Fluid buildup can cause shortness of breath, fatigue, swollen ankles, and other problems. By taking medicines regularly, reducing sodium (salt) in your diet, checking your weight every day, and making lifestyle changes, you can feel better and live longer. Follow-up care is a key part of your treatment and safety. Be sure to make and go to all appointments, and call your doctor if you are having problems. It's also a good idea to know your test results and keep a list of the medicines you take. How can you care for yourself at home? Medicines    · Be safe with medicines. Take your medicines exactly as prescribed. Call your doctor if you think you are having a problem with your medicine.     · Do not take any vitamins, over-the-counter medicine, or herbal products without talking to your doctor first. [place] not take ibuprofen (Advil or Motrin) and naproxen (Aleve) without talking to your doctor first. They could make your heart failure worse.     · You may take some of the following medicine. ? Angiotensin-converting enzyme inhibitors (ACEIs) or angiotensin II receptor blockers (ARBs) reduce the heart's workload, lower blood pressure, and reduce swelling. Taking an ACEI or ARB may lower your chance of needing to be hospitalized. ? Beta-blockers can slow heart rate, decrease blood pressure, and improve your condition. Taking a beta-blocker may lower your chance of needing to be hospitalized. ? Diuretics, also called water pills, reduce swelling.    You will get more details on the specific medicines your doctor prescribes.   Diet    · Your doctor may suggest that you limit sodium. Your doctor can tell you how much sodium is right for you. An example is less than 3,000 mg a day. This includes all the salt you eat in cooking or in packaged foods. People get most of their sodium from processed foods. Fast food and restaurant meals also tend to be very high in sodium.     · Ask your doctor how much liquid you can drink each day. You may have to limit liquids.    Weight    · Weigh yourself without clothing at the same time each day. Record your weight. Call your doctor if you have a sudden weight gain, such as more than 2 to 3 pounds in a day or 5 pounds in a week. (Your doctor may suggest a different range of weight gain.) A sudden weight gain may mean that your heart failure is getting worse.    Activity level    · Start light exercise (if your doctor says it is okay). Even if you can only do a small amount, exercise will help you get stronger, have more energy, and manage your weight and your stress. Walking is an easy way to get exercise. Start out by walking a little more than you did before. Bit by bit, increase the amount you walk.     · When you exercise, watch for signs that your heart is working too hard. You are pushing yourself too hard if you cannot talk while you are exercising. If you become short of breath or dizzy or have chest pain, stop, sit down, and rest.     · If you feel \"wiped out\" the day after you exercise, walk slower or for a shorter distance until you can work up to a better pace.     · Get enough rest at night. Sleeping with 1 or 2 pillows under your upper body and head may help you breathe easier.    Lifestyle changes    · Do not smoke. Smoking can make a heart condition worse. If you need help quitting, talk to your doctor about stop-smoking programs and medicines. These can increase your chances of quitting for good.  Quitting smoking may be the most important step you can take to protect your heart.     · Limit alcohol to 2 drinks a day for men and 1 drink a day for women. Too much alcohol can cause health problems.     · Avoid getting sick from colds and the flu. Get a pneumococcal vaccine shot. If you have had one before, ask your doctor whether you need another dose. Get a flu shot each year. If you must be around people with colds or the flu, wash your hands often. When should you call for help? Call 911 if you have symptoms of sudden heart failure such as:    · You have severe trouble breathing.     · You cough up pink, foamy mucus.     · You have a new irregular or rapid heartbeat.    Call your doctor now or seek immediate medical care if:    · You have new or increased shortness of breath.     · You are dizzy or lightheaded, or you feel like you may faint.     · You have sudden weight gain, such as more than 2 to 3 pounds in a day or 5 pounds in a week. (Your doctor may suggest a different range of weight gain.)     · You have increased swelling in your legs, ankles, or feet.     · You are suddenly so tired or weak that you cannot do your usual activities.    Watch closely for changes in your health, and be sure to contact your doctor if you develop new symptoms. Where can you learn more? Go to http://susan-sumi.info/  Enter Z393 in the search box to learn more about \"Heart Failure: Care Instructions. \"  Current as of: December 15, 2019Content Version: 12.4  © 1144-9997 [employer]. Care instructions adapted under license by [employer] (which disclaims liability or warranty for this information). If you have questions about a medical condition or this instruction, always ask your healthcare professional. [place] [address] any warranty or liability for your use of this information. Patient Education        Leg and Ankle Edema: Care Instructions  Your Care Instructions  Swelling in the legs, ankles, and feet is called edema. It is common after you sit or stand for a while. Long plane flights or car rides often cause swelling in the legs and feet. You may also have swelling if you have to stand for long periods of time at your job. Problems with the veins in the legs (varicose veins) and changes in hormones can also cause swelling. Sometimes the swelling in the ankles and feet is caused by a more serious problem, such as heart failure, infection, blood clots, or liver or kidney disease. Follow-up care is a key part of your treatment and safety. Be sure to make and go to all appointments, and call your doctor if you are having problems. It's also a good idea to know your test results and keep a list of the medicines you take. How can you care for yourself at home? · If your doctor gave you medicine, take it as prescribed. Call your doctor if you think you are having a problem with your medicine. · Whenever you are resting, raise your legs up. Try to keep the swollen area higher than the level of your heart. · Take breaks from standing or sitting in one position. ? Walk around to increase the blood flow in your lower legs. ? Move your feet and ankles often while you stand, or tighten and relax your leg muscles. · Wear support stockings. Put them on in the morning, before swelling gets worse. · Eat a balanced diet. Lose weight if you need to. · Limit the amount of salt (sodium) in your diet. Salt holds fluid in the body and may increase swelling. When should you call for help? Call 911 anytime you think you may need emergency care. For example, call if:    · You have symptoms of a blood clot in your lung (called a pulmonary embolism). These may include:  ? Sudden chest pain. ? Trouble breathing. ? Coughing up blood.    Call your doctor now or seek immediate medical care if:    · You have signs of a blood clot, such as:  ? Pain in your calf, back of the knee, thigh, or groin. ?  Redness and swelling in your leg or groin.     · You have symptoms of infection, such as:  ? Increased pain, swelling, warmth, or redness. ? Red streaks or pus. ? A fever.    Watch closely for changes in your health, and be sure to contact your doctor if:    · Your swelling is getting worse.     · You have new or worsening pain in your legs.     · You do not get better as expected. Where can you learn more? Go to http://susan-sumi.info/  Enter B816 in the search box to learn more about \"Leg and Ankle Edema: Care Instructions. \"  Current as of: June 26, 2019Content Version: 12.4  © 9608-7560 [employer]. Care instructions adapted under license by [employer] (which disclaims liability or warranty for this information). If you have questions about a medical condition or this instruction, always ask your healthcare professional. [address] any warranty or liability for your use of this information. Patient Education        Learning About Heart Failure  What is heart failure? Heart failure means that your heart muscle does not pump as much blood as your body needs. Failure does not mean that your heart has stopped. It means that your heart is not pumping as well as it should. Your body has an amazing ability to make up for heart failure. It may do such a good job that you don't know you have a disease. But at some point, your heart and body will no longer be able to keep up. Then fluid starts to build up in your lungs and other parts of your body. What can you expect when you have heart failure? Heart failure is a lifelong (chronic) disease. Treatment may be able to slow the disease and help you feel better. But heart failure tends to get worse over time. Despite this, there are many steps you can take to feel better and stay healthy longer. Early on, your symptoms may not be too bad. As heart failure gets worse, symptoms typically get worse, and you may need to limit your activities.  Heart failure can also get worse suddenly. If this happens, you need emergency care. Then, after treatment, your symptoms may go back to being stable (which means they stay the same) for a long time. Heart failure can lead to other health problems, such as heart rhythm problems. Over time, your treatment options may change, especially as your symptoms get worse. As heart failure gets worse, palliative care can help improve the quality of your life. You can do advance care planning to decide what kind of care you want at the end of your life. What are the symptoms? Symptoms of heart failure start to happen when your heart can't pump enough blood to the rest of your body. In the early stages of heart failure, you may:  · Feel tired easily. · Be short of breath when you exert yourself. · Feel like your heart is pounding or racing (palpitations). · Feel weak or dizzy. As heart failure gets worse, fluid starts to build up in your lungs and other parts of your body. This may cause you to:  · Feel short of breath even at rest.  · Have swelling (edema), especially in your legs, ankles, and feet. · Gain weight. This may happen over just a day or two, or more slowly. · Cough or wheeze, especially when you lie down. How is heart failure treated? · You'll probably take several medicines. · You might attend cardiac rehabilitation (rehab) to get education and support that help you make lifestyle changes and stay as healthy as possible. · You may get a heart device. A pacemaker helps your heart pump blood. An ICD can stop abnormal heart rhythms. How can you care for yourself? There are many steps you can take to feel better and live longer. These steps are an important part of treatment. They can help you stay active and enjoy life. · Take your medicine the right way. Avoid medicines that can make your symptoms worse. · Check your weight and symptoms every day. Know what to do if your symptoms get worse. · Limit sodium.  This helps keep fluid from building up. It may help you feel better. · Be active. Exercise regularly, but don't exercise too hard. · Be heart-healthy. Eat healthy foods, stay at a healthy weight, limit alcohol, and don't smoke. · Stay as healthy as possible. Manage other health problems such as diabetes and high blood pressure. Avoid colds and flu, get help for depression and anxiety, and manage stress. If you think you may have a problem with alcohol or drug use, talk to your doctor. Follow-up care is a key part of your treatment and safety. Be sure to make and go to all appointments, and call your doctor if you are having problems. It's also a good idea to know your test results and keep a list of the medicines you take. Where can you learn more? Go to http://susan-sumi.info/  Enter [id number] in the search box to learn more about \"Learning About Heart Failure. \"  Current as of: December 15, 2019Content Version: 12.4  © 2410-0794 [employer]. Care instructions adapted under license by [employer] (which disclaims liability or warranty for this information). If you have questions about a medical condition or this instruction, always ask your healthcare professional. [address] any warranty or liability for your use of this information.

## 2020-04-13 NOTE — ED PROVIDER NOTES
Date: 4/13/2020  Patient Name: Pancho Gonzales    History of Presenting Illness     Chief Complaint   Patient presents with    Shortness of Breath       History Provided By: Patient    HPI/Chief Complaint: (Context):who presents with 3 days of cough, shortness of breath. States her been off her medications few days ago and she wants medication refill    No chest pain no tightness no abdominal pain no focal arm or leg weakness no vomiting no diarrhea  Patient is a hard of hearing patient and needs a lot of guidance to communicate. Patient denies any focal arm or leg weakness states her hearing has been bad since she had a stroke and hearing aids do not work for her. ---  Patient presents emergency department with GINA level 3  Complaint is shortness of breath  Blood pressure is 142/105, pulse ox is 100%, home medication include Norvasc Eliquis Coreg Cardura Zestril Crestor  Medical history includes CVA A. fib hyperlipidemia hypertension hard of hearing  Hysterectomy  Social is is no smoking no alcohol no drugs  Patient's prior visits is for hip contusion knee contusion  ---  Patient's chart review shows last admission was in December 2017 for heart failure. ---  Patient does not use oxygen at home. PCP: None    Current Facility-Administered Medications   Medication Dose Route Frequency Provider Last Rate Last Dose    furosemide (LASIX) injection 40 mg  40 mg IntraVENous NOW Amanda Josue MD        nitroglycerin (NITROBID) 2 % ointment 1 Inch  1 Inch Topical ONCE Amanda Josue MD         Current Outpatient Medications   Medication Sig Dispense Refill    lidocain-me. salicyl-caps-menth 7.4-30-6.713-0 % ptmd 1 Patch by Apply Externally route two (2) times daily as needed for Pain. 20 Patch 0    lisinopril (PRINIVIL, ZESTRIL) 40 mg tablet TAKE ONE TABLET BY MOUTH EVERY DAY 90 Tab 3    amLODIPine (NORVASC) 10 mg tablet Take 1 Tab by mouth daily.  90 Tab 3    apixaban (ELIQUIS) 5 mg tablet Take 1 Tab by mouth two (2) times a day. 180 Tab 3    carvedilol (COREG) 25 mg tablet Take 1 Tab by mouth two (2) times daily (with meals). 180 Tab 3    doxazosin (CARDURA) 8 mg tablet Take 1 Tab by mouth two (2) times a day. 180 Tab 6    rosuvastatin (CRESTOR) 5 mg tablet Take 1 Tab by mouth nightly. 90 Tab 3    aspirin delayed-release (ASPIRIN LOW DOSE) 81 mg tablet Take 1 Tab by mouth daily. 100 Tab 2       Past History     Past Medical History:  Past Medical History:   Diagnosis Date    Atrial fibrillation ([address]     10/11 per Dr Jr Smith note; converted to SR and of [place] for 3 months already and staying in [place]    Cerebrovascular accident [facility]) 10/2011    left mu; improved almost completely    Essential hypertension     Essential hypertension, benign     Hard of hearing 3/20/2015    Other and unspecified hyperlipidemia     LDLs are at goal, triglycerides are at goal, HDLs are at goal       Past Surgical History:  Past Surgical History:   Procedure Laterality Date    HX HYSTERECTOMY      total       Family History:  Family History   Problem Relation Age of Onset    High Cholesterol Sister     Hypertension Other         family history of essential hypertension    Heart Attack Neg Hx     Sudden Death Neg Hx        Social History:  Social History     Tobacco Use    Smoking status: Never Smoker    Smokeless tobacco: Never Used   Substance Use Topics    Alcohol use: No     Comment: occasional, quit     Drug use: No       Allergies: Allergies   Allergen Reactions    Tetanus Vaccines And Toxoid Swelling    Penicillins Swelling         Review of Systems   Review of Systems   Constitutional: Negative for activity change, fatigue and fever. HENT: Negative for congestion and rhinorrhea. Eyes: Negative for visual disturbance. Respiratory: Positive for cough and shortness of breath. Cardiovascular: Negative for chest pain and palpitations.    Gastrointestinal: Negative for abdominal pain, diarrhea, nausea and vomiting. Genitourinary: Negative for dysuria and hematuria. Musculoskeletal: Negative for back pain. Skin: Negative for rash. Neurological: Negative for dizziness, weakness and light-headedness. Psychiatric/Behavioral: Negative for agitation. All other systems reviewed and are negative. Physical Exam     Physical Exam  Vitals signs and nursing note reviewed. Constitutional:       General: She is not in acute distress. Appearance: She is well-developed. HENT:      Head: Normocephalic and atraumatic. Right Ear: External ear normal.      Left Ear: External ear normal.      Nose: Nose normal.   Eyes:      General: No scleral icterus. Conjunctiva/sclera: Conjunctivae normal.      Pupils: Pupils are equal, round, and reactive to light. Neck:      Musculoskeletal: Normal range of motion and neck supple. Thyroid: No thyromegaly. Vascular: No JVD. Trachea: No tracheal deviation. Cardiovascular:      Rate and Rhythm: Normal rate and regular rhythm. Heart sounds: No murmur. No friction rub. Pulmonary:      Effort: Pulmonary effort is normal.      Breath sounds: Normal breath sounds. No stridor. Chest:      Chest wall: No tenderness. Abdominal:      General: Bowel sounds are normal. There is no distension. Palpations: Abdomen is soft. Tenderness: There is no abdominal tenderness. There is no guarding or rebound. Musculoskeletal: Normal range of motion. General: No tenderness. Right lower leg: She exhibits no tenderness. No edema. Left lower leg: She exhibits no tenderness. No edema. Lymphadenopathy:      Cervical: No cervical adenopathy. Skin:     General: Skin is warm and dry. Capillary Refill: Capillary refill takes less than 2 seconds. Neurological:      General: No focal deficit present. Mental Status: She is alert. Cranial Nerves: No cranial nerve deficit.       Coordination: Coordination normal. Psychiatric:         Mood and Affect: Mood normal.         Behavior: Behavior normal.         Thought Content: Thought content normal.         Judgment: Judgment normal.       Medical Decision Making   I am the first provider for this patient. I reviewed the vital signs, available nursing notes, past medical history, past surgical history, family history and social history. Provider Notes (Medical Decision Making): Patient presents with shortness of breath and cough. No chest pain no exertional symptoms  Patient's initial EKG is abnormal with T wave inversions  Patient's BNP is 4500  Patient's troponin is elevated as well and renal insufficiency present  Chest x-ray did not show any acute finding    I discussed this information with the cardiologist for Dr. Doc Bravo answered the request for my evaluation and she stated that the EKG was different and patient has poor EF in the past she would do best by staying in the hospital I will try to transfer the patient and also discussed the information with my hospitalist for further intervention as well as abnormal findings are present. Vital Signs-Reviewed the patient's vital signs. Pulse Oximetry Analysis -100%, room air, normal  Cardiac Monitor:  Rate/Rhythm: 89, atrial fibrillation    EKG: Interpreted by the EP. Patient's EKG shows done at 11:14 AM, history of irregular heartbeat, atrial fibrillation, 89, QTC is widened to 498 ms, leftward axis is appreciated  There is T wave inversion lead I, aVL, V5, V6 and also poor R wave progression. ---  Today's EKG is compared with 7/20/2015 the T wave inversion morphology in the lateral precordial and limb lead is similar to prior poor R wave progression is also appreciated but worsened on this EKG from today.   --  2:51 PM  I reviewed patient's labs, patient's creatinine 1.71, troponin is 0.1, BNP is 4554  Patient's chest x-ray with no acute process  --  Patient's [facility] labs have been reviewed, patient's creatinine is 2.0 documented on August 2018  Patient's echocardiogram from 12/2017 with EF of 15% and second echocardiogram documenting 29% global hypokinesia       Vitals:    04/13/20 0958 04/13/20 1338   BP: (!) 142/105 (!) 144/117   Pulse: 95 84   Resp: 19 13   Temp: 97.8 °F (36.6 °C) 98.7 °F (37.1 °C)   SpO2: 100% 98%   Weight: 86.2 kg (190 lb)    Height: 5' 6\" (1.676 m)        Records Reviewed: Nursing Notes    ED Course:   3:29 PM  Patient remained stable in the emergency department still continues to have the shortness of breath and the cough  Patient with history of heart failure and elevated troponin  I am in the process of discussing with transfer team for the patient to go to heart center versus staying in our hospital.    Patient's repeat EKG done at 1529, 87 heart rate, atrial fibrillation, , leftward axis, T wave inversion lead I and aVF V5 and V6 are persistent poor R wave progression in anterior wall is persistent as well Q in inferior wall is appreciated as well. --  Patient states that she is ready to go. She does not want to stay in the hospital she knows she needs her medications otherwise she is good to go. Patient is hard of hearing at to get close to her to let her know that we are trying to admit her or transfer her to [facility] I have spoken with her cardiology team  Patient has abnormal labs has heart failure as well  Patient states she does not want to stay in the hospital no matter what she understands that she may die from this but she does not want to stay and she is ready to go. Gave her her medications as prescriptions  Of asked her to call her primary care physician and her cardiologist  Patient understands and agrees with the plan and repeats it back to me  Patient understands to return immediately finding changes or worsens.   I will do a kind of discharge on her  She is awake and alert in the emergency department no distress resting comfortably    ===  Discharge Note:  4:53 PM  The pt is ready for discharge. The pt's signs, symptoms, diagnosis, and discharge instructions have been discussed and pt has conveyed their understanding. The pt is to follow up as recommended or return to ER should their symptoms worsen. Plan has been discussed and pt is in agreement. Diagnostic Study Results     Orders Placed This Encounter    XR CHEST PORTABLE     Standing Status:   Standing     Number of Occurrences:   1     Order Specific Question:   Transport     Answer:   No Transport [3]     Order Specific Question:   Reason for Exam     Answer:   Shortness of Breath    METABOLIC PANEL, COMPREHENSIVE     Standing Status:   Standing     Number of Occurrences:   1    CBC WITH AUTOMATED DIFF     Standing Status:   Standing     Number of Occurrences:   1    TROPONIN I     Standing Status:   Standing     Number of Occurrences:   1    NT-PRO BNP     Standing Status:   Standing     Number of Occurrences:   1    CARDIAC PANEL,(CK, CKMB & TROPONIN)     Standing Status:   Standing     Number of Occurrences:   1    DIET NPO     Standing Status:   Standing     Number of Occurrences:   1    CARDIAC MONITOR - ED ONLY     Standing Status:   Standing     Number of Occurrences:   1     Order Specific Question:   Type: Answer:   Bedside    PULSE OXIMETRY CONTINUOUS     Standing Status:   Standing     Number of Occurrences:   1    PULSE OXIMETRY SPOT CHECK     Standing Status:   Standing     Number of Occurrences:   1    CARDIAC MONITORING     Standing Status:   Standing     Number of Occurrences:   1     Order Specific Question:   Type: Answer:   Bedside     Order Specific Question:   Off unit:      Answer:   w/ Tele only    PULSE OXIMETRY SPOT CHECK     Standing Status:   Standing     Number of Occurrences:   1    OXYGEN CANNULA Liters per minute: 2; Indications for O2 therapy: RESPIRATORY DISTRESS CONTINUOUS STAT     Standing Status:   Standing Number of Occurrences:   1     Order Specific Question:   Liters per minute: Answer:   2     Order Specific Question:   Indications for O2 therapy     Answer:   RESPIRATORY DISTRESS    EKG, 12 LEAD, INITIAL     Standing Status:   Standing     Number of Occurrences:   1     Order Specific Question:   Reason for Exam:     Answer:   Shortness of Breath    EKG, 12 LEAD, SUBSEQUENT     Standing Status:   Standing     Number of Occurrences:   1     Order Specific Question:   Reason for Exam:     Answer:   Elevated troponin, concern for MI    SALINE LOCK IV ONE TIME STAT     Standing Status:   Standing     Number of Occurrences:   1    furosemide (LASIX) injection 40 mg    nitroglycerin (NITROBID) 2 % ointment 1 Inch    IP CONSULT TO CARDIOLOGY     Standing Status:   Standing     Number of Occurrences:   1     Order Specific Question:   Reason for Consult: Answer:   sob/chf/elevated trop     Order Specific Question:   Did you call or speak to the consulting provider? Answer:   No     Order Specific Question:   Consult To     Answer:   manage pt's chf/elevated trop     Order Specific Question:   Schedule When? Answer:   TODAY       Labs -     Recent Results (from the past 12 hour(s))   METABOLIC PANEL, COMPREHENSIVE    Collection Time: 04/13/20 10:32 AM   Result Value Ref Range    Sodium 140 136 - 145 mmol/L    Potassium 5.1 3.5 - 5.5 mmol/L    Chloride 107 100 - 111 mmol/L    CO2 25 21 - 32 mmol/L    Anion gap 8 3.0 - 18 mmol/L    Glucose 109 (H) 74 - 99 mg/dL    BUN 25 (H) 7.0 - 18 MG/DL    Creatinine 1.71 (H) 0.6 - 1.3 MG/DL    BUN/Creatinine ratio 15 12 - 20      GFR est AA 35 (L) >60 ml/min/1.73m2    GFR est non-AA 29 (L) >60 ml/min/1.73m2    Calcium 8.4 (L) 8.5 - 10.1 MG/DL    Bilirubin, total 0.8 0.2 - 1.0 MG/DL    ALT (SGPT) 27 13 - 56 U/L    AST (SGOT) 45 (H) 10 - 38 U/L    Alk.  phosphatase 86 45 - 117 U/L    Protein, total 7.3 6.4 - 8.2 g/dL    Albumin 3.0 (L) 3.4 - 5.0 g/dL    Globulin 4.3 (H) 2.0 - 4.0 g/dL    A-G Ratio 0.7 (L) 0.8 - 1.7     CBC WITH AUTOMATED DIFF    Collection Time: 04/13/20 10:32 AM   Result Value Ref Range    WBC 5.1 4.6 - 13.2 K/uL    RBC 4.26 4.20 - 5.30 M/uL    HGB 12.5 12.0 - 16.0 g/dL    HCT 37.9 35.0 - 45.0 %    MCV 89.0 74.0 - 97.0 FL    MCH 29.3 24.0 - 34.0 PG    MCHC 33.0 31.0 - 37.0 g/dL    RDW 17.6 (H) 11.6 - 14.5 %    PLATELET 650 215 - 607 K/uL    MPV 12.8 (H) 9.2 - 11.8 FL    NEUTROPHILS 56 40 - 73 %    LYMPHOCYTES 31 21 - 52 %    MONOCYTES 11 (H) 3 - 10 %    EOSINOPHILS 2 0 - 5 %    BASOPHILS 0 0 - 2 %    ABS. NEUTROPHILS 2.9 1.8 - 8.0 K/UL    ABS. LYMPHOCYTES 1.6 0.9 - 3.6 K/UL    ABS. MONOCYTES 0.6 0.05 - 1.2 K/UL    ABS. EOSINOPHILS 0.1 0.0 - 0.4 K/UL    ABS. BASOPHILS 0.0 0.0 - 0.1 K/UL    DF AUTOMATED     TROPONIN I    Collection Time: 04/13/20 10:32 AM   Result Value Ref Range    Troponin-I, QT 0.10 (H) 0.0 - 0.045 NG/ML   NT-PRO BNP    Collection Time: 04/13/20 10:32 AM   Result Value Ref Range    NT pro-BNP 4,554 (H) 0 - 1,800 PG/ML   EKG, 12 LEAD, INITIAL    Collection Time: 04/13/20 11:14 AM   Result Value Ref Range    Ventricular Rate 89 BPM    Atrial Rate 288 BPM    QRS Duration 122 ms    Q-T Interval 410 ms    QTC Calculation (Bezet) 498 ms    Calculated R Axis -49 degrees    Calculated T Axis 142 degrees    Diagnosis       Atrial fibrillation  Left axis deviation  Septal infarct , age undetermined  ST & T wave abnormality, consider lateral ischemia  Abnormal ECG  No previous ECGs available  Confirmed by Luana Suárez MD, ----- ([id number]) on 4/13/2020 2:42:04 PM         Radiologic Studies -   XR CHEST PORT   Final Result   IMPRESSION:      No acute cardiopulmonary process        CT Results  (Last 48 hours)    None        CXR Results  (Last 48 hours)               04/13/20 1111  XR CHEST PORT Final result    Impression:  IMPRESSION:       No acute cardiopulmonary process       Narrative:  PORTABLE CHEST RADIOGRAPH        INDICATION: Shortness of breath. History of fall and knee pain. COMPARISON: 3/1/2020. FINDINGS:       Trachea is midline. There is a left-sided pacemaker with leads overlying the   right atrium and right ventricle. The cardiomediastinal silhouette is mildly   enlarged, unchanged with tortuous thoracic aorta. Mild coarsening of the   pulmonary interstitium. No confluent consolidation. No effusion or overt   pneumothorax. Osseous structures are grossly intact. Discharge     Clinical Impression:   1. Acute congestive heart failure, unspecified heart failure type ([address]    2. Peripheral edema    3. Elevated troponin    4.  Abnormal EKG        Disposition:  Admit    It should be noted that I will be the provider of record for this patient  Gwen Whiting MD      Follow-up Information    None         Current Discharge Medication List

## 2020-04-14 LAB
ATRIAL RATE: 277 BPM
CALCULATED R AXIS, ECG10: -51 DEGREES
CALCULATED T AXIS, ECG11: 159 DEGREES
DIAGNOSIS, 93000: NORMAL
Q-T INTERVAL, ECG07: 422 MS
QRS DURATION, ECG06: 122 MS
QTC CALCULATION (BEZET), ECG08: 507 MS
VENTRICULAR RATE, ECG03: 87 BPM

## 2021-04-15 ENCOUNTER — HOSPITAL ENCOUNTER (INPATIENT)
Age: 76
LOS: 6 days | Discharge: HOME HEALTH | DRG: 291 | End: 2021-04-21
Attending: EMERGENCY MEDICINE | Admitting: HOSPITALIST
Payer: MEDICARE

## 2021-04-15 ENCOUNTER — APPOINTMENT (OUTPATIENT)
Dept: GENERAL RADIOLOGY | Age: 76
DRG: 291 | End: 2021-04-15
Attending: PHYSICIAN ASSISTANT
Payer: MEDICARE

## 2021-04-15 DIAGNOSIS — I42.0 DILATED CARDIOMYOPATHY (HCC): ICD-10-CM

## 2021-04-15 DIAGNOSIS — I48.11 LONGSTANDING PERSISTENT ATRIAL FIBRILLATION (HCC): ICD-10-CM

## 2021-04-15 DIAGNOSIS — I50.9 ACUTE CONGESTIVE HEART FAILURE, UNSPECIFIED HEART FAILURE TYPE (HCC): Primary | ICD-10-CM

## 2021-04-15 DIAGNOSIS — I10 ESSENTIAL HYPERTENSION: ICD-10-CM

## 2021-04-15 DIAGNOSIS — I50.23 ACUTE ON CHRONIC SYSTOLIC (CONGESTIVE) HEART FAILURE (HCC): ICD-10-CM

## 2021-04-15 PROBLEM — Z95.0 PACEMAKER: Status: ACTIVE | Noted: 2021-04-15

## 2021-04-15 LAB
ALBUMIN SERPL-MCNC: 3.3 G/DL (ref 3.4–5)
ALBUMIN/GLOB SERPL: 0.9 {RATIO} (ref 0.8–1.7)
ALP SERPL-CCNC: 92 U/L (ref 45–117)
ALT SERPL-CCNC: 56 U/L (ref 13–56)
ANION GAP SERPL CALC-SCNC: 6 MMOL/L (ref 3–18)
AST SERPL-CCNC: 46 U/L (ref 10–38)
BASOPHILS # BLD: 0.1 K/UL (ref 0–0.1)
BASOPHILS NFR BLD: 2 % (ref 0–2)
BILIRUB SERPL-MCNC: 0.7 MG/DL (ref 0.2–1)
BNP SERPL-MCNC: 4786 PG/ML (ref 0–1800)
BUN SERPL-MCNC: 24 MG/DL (ref 7–18)
BUN/CREAT SERPL: 20 (ref 12–20)
CALCIUM SERPL-MCNC: 8.8 MG/DL (ref 8.5–10.1)
CHLORIDE SERPL-SCNC: 108 MMOL/L (ref 100–111)
CK MB CFR SERPL CALC: 0.7 % (ref 0–4)
CK MB SERPL-MCNC: 1.3 NG/ML (ref 5–25)
CK SERPL-CCNC: 195 U/L (ref 26–192)
CO2 SERPL-SCNC: 27 MMOL/L (ref 21–32)
CREAT SERPL-MCNC: 1.2 MG/DL (ref 0.6–1.3)
DIFFERENTIAL METHOD BLD: ABNORMAL
EOSINOPHIL # BLD: 0.2 K/UL (ref 0–0.4)
EOSINOPHIL NFR BLD: 3 % (ref 0–5)
ERYTHROCYTE [DISTWIDTH] IN BLOOD BY AUTOMATED COUNT: 15.7 % (ref 11.6–14.5)
GLOBULIN SER CALC-MCNC: 3.6 G/DL (ref 2–4)
GLUCOSE SERPL-MCNC: 113 MG/DL (ref 74–99)
HCT VFR BLD AUTO: 37.2 % (ref 35–45)
HGB BLD-MCNC: 12.3 G/DL (ref 12–16)
LYMPHOCYTES # BLD: 2.9 K/UL (ref 0.9–3.6)
LYMPHOCYTES NFR BLD: 42 % (ref 21–52)
MCH RBC QN AUTO: 29.6 PG (ref 24–34)
MCHC RBC AUTO-ENTMCNC: 33.1 G/DL (ref 31–37)
MCV RBC AUTO: 89.4 FL (ref 74–97)
MONOCYTES # BLD: 0.1 K/UL (ref 0.05–1.2)
MONOCYTES NFR BLD: 2 % (ref 3–10)
NEUTS SEG # BLD: 3.7 K/UL (ref 1.8–8)
NEUTS SEG NFR BLD: 51 % (ref 40–73)
PLATELET # BLD AUTO: 227 K/UL (ref 135–420)
PLATELET COMMENTS,PCOM: ABNORMAL
PMV BLD AUTO: 12.1 FL (ref 9.2–11.8)
POTASSIUM SERPL-SCNC: 4.2 MMOL/L (ref 3.5–5.5)
PROT SERPL-MCNC: 6.9 G/DL (ref 6.4–8.2)
RBC # BLD AUTO: 4.16 M/UL (ref 4.2–5.3)
RBC MORPH BLD: ABNORMAL
SODIUM SERPL-SCNC: 141 MMOL/L (ref 136–145)
TROPONIN I SERPL-MCNC: 0.04 NG/ML (ref 0–0.04)
WBC # BLD AUTO: 7 K/UL (ref 4.6–13.2)

## 2021-04-15 PROCEDURE — 74011250637 HC RX REV CODE- 250/637: Performed by: PHYSICIAN ASSISTANT

## 2021-04-15 PROCEDURE — 96374 THER/PROPH/DIAG INJ IV PUSH: CPT

## 2021-04-15 PROCEDURE — 2709999900 HC NON-CHARGEABLE SUPPLY

## 2021-04-15 PROCEDURE — 99285 EMERGENCY DEPT VISIT HI MDM: CPT

## 2021-04-15 PROCEDURE — 82553 CREATINE MB FRACTION: CPT

## 2021-04-15 PROCEDURE — 74011000250 HC RX REV CODE- 250: Performed by: HOSPITALIST

## 2021-04-15 PROCEDURE — 74011250636 HC RX REV CODE- 250/636: Performed by: PHYSICIAN ASSISTANT

## 2021-04-15 PROCEDURE — 85025 COMPLETE CBC W/AUTO DIFF WBC: CPT

## 2021-04-15 PROCEDURE — 65660000000 HC RM CCU STEPDOWN

## 2021-04-15 PROCEDURE — 71045 X-RAY EXAM CHEST 1 VIEW: CPT

## 2021-04-15 PROCEDURE — 80053 COMPREHEN METABOLIC PANEL: CPT

## 2021-04-15 PROCEDURE — 74011250637 HC RX REV CODE- 250/637: Performed by: HOSPITALIST

## 2021-04-15 PROCEDURE — 83880 ASSAY OF NATRIURETIC PEPTIDE: CPT

## 2021-04-15 PROCEDURE — 93005 ELECTROCARDIOGRAM TRACING: CPT

## 2021-04-15 RX ORDER — PROMETHAZINE HYDROCHLORIDE 12.5 MG/1
12.5 TABLET ORAL
Status: DISCONTINUED | OUTPATIENT
Start: 2021-04-15 | End: 2021-04-21 | Stop reason: HOSPADM

## 2021-04-15 RX ORDER — DEXAMETHASONE SODIUM PHOSPHATE 4 MG/ML
10 INJECTION, SOLUTION INTRA-ARTICULAR; INTRALESIONAL; INTRAMUSCULAR; INTRAVENOUS; SOFT TISSUE
Status: DISCONTINUED | OUTPATIENT
Start: 2021-04-15 | End: 2021-04-15

## 2021-04-15 RX ORDER — ATORVASTATIN CALCIUM 20 MG/1
20 TABLET, FILM COATED ORAL DAILY
COMMUNITY
Start: 2021-04-01

## 2021-04-15 RX ORDER — FUROSEMIDE 10 MG/ML
100 INJECTION INTRAMUSCULAR; INTRAVENOUS
Status: COMPLETED | OUTPATIENT
Start: 2021-04-15 | End: 2021-04-15

## 2021-04-15 RX ORDER — FUROSEMIDE 10 MG/ML
40 INJECTION INTRAMUSCULAR; INTRAVENOUS
Status: DISCONTINUED | OUTPATIENT
Start: 2021-04-15 | End: 2021-04-15

## 2021-04-15 RX ORDER — ATORVASTATIN CALCIUM 20 MG/1
20 TABLET, FILM COATED ORAL DAILY
Status: DISCONTINUED | OUTPATIENT
Start: 2021-04-16 | End: 2021-04-21 | Stop reason: HOSPADM

## 2021-04-15 RX ORDER — SODIUM CHLORIDE 0.9 % (FLUSH) 0.9 %
5-40 SYRINGE (ML) INJECTION AS NEEDED
Status: DISCONTINUED | OUTPATIENT
Start: 2021-04-15 | End: 2021-04-21 | Stop reason: HOSPADM

## 2021-04-15 RX ORDER — ACETAMINOPHEN 325 MG/1
650 TABLET ORAL
Status: DISCONTINUED | OUTPATIENT
Start: 2021-04-15 | End: 2021-04-21 | Stop reason: HOSPADM

## 2021-04-15 RX ORDER — APIXABAN 5 MG/1
5 TABLET, FILM COATED ORAL 2 TIMES DAILY
COMMUNITY
Start: 2021-03-24

## 2021-04-15 RX ORDER — ONDANSETRON 2 MG/ML
4 INJECTION INTRAMUSCULAR; INTRAVENOUS
Status: DISCONTINUED | OUTPATIENT
Start: 2021-04-15 | End: 2021-04-21 | Stop reason: HOSPADM

## 2021-04-15 RX ORDER — IPRATROPIUM BROMIDE AND ALBUTEROL SULFATE 2.5; .5 MG/3ML; MG/3ML
3 SOLUTION RESPIRATORY (INHALATION) ONCE
Status: DISCONTINUED | OUTPATIENT
Start: 2021-04-15 | End: 2021-04-15

## 2021-04-15 RX ORDER — AMLODIPINE BESYLATE 10 MG/1
10 TABLET ORAL DAILY
COMMUNITY
Start: 2021-04-01

## 2021-04-15 RX ORDER — METOLAZONE 5 MG/1
5 TABLET ORAL DAILY
Qty: 2 TAB | Refills: 0 | Status: SHIPPED | OUTPATIENT
Start: 2021-04-15 | End: 2021-04-21

## 2021-04-15 RX ORDER — AMLODIPINE BESYLATE 10 MG/1
10 TABLET ORAL DAILY
Status: DISCONTINUED | OUTPATIENT
Start: 2021-04-16 | End: 2021-04-18

## 2021-04-15 RX ORDER — ACETAMINOPHEN 650 MG/1
650 SUPPOSITORY RECTAL
Status: DISCONTINUED | OUTPATIENT
Start: 2021-04-15 | End: 2021-04-21 | Stop reason: HOSPADM

## 2021-04-15 RX ORDER — MAGNESIUM SULFATE HEPTAHYDRATE 40 MG/ML
2 INJECTION, SOLUTION INTRAVENOUS
Status: DISCONTINUED | OUTPATIENT
Start: 2021-04-15 | End: 2021-04-15

## 2021-04-15 RX ORDER — METOPROLOL TARTRATE 50 MG/1
100 TABLET ORAL 2 TIMES DAILY
Status: DISCONTINUED | OUTPATIENT
Start: 2021-04-15 | End: 2021-04-16

## 2021-04-15 RX ORDER — SACUBITRIL AND VALSARTAN 49; 51 MG/1; MG/1
1 TABLET, FILM COATED ORAL 2 TIMES DAILY
COMMUNITY
Start: 2021-03-24 | End: 2021-04-21

## 2021-04-15 RX ORDER — METOPROLOL TARTRATE 100 MG/1
100 TABLET ORAL 2 TIMES DAILY
COMMUNITY
Start: 2021-03-20 | End: 2021-04-21

## 2021-04-15 RX ORDER — ASPIRIN 81 MG/1
81 TABLET ORAL DAILY
Status: DISCONTINUED | OUTPATIENT
Start: 2021-04-16 | End: 2021-04-21 | Stop reason: HOSPADM

## 2021-04-15 RX ORDER — BUMETANIDE 0.25 MG/ML
1 INJECTION INTRAMUSCULAR; INTRAVENOUS 2 TIMES DAILY
Status: DISCONTINUED | OUTPATIENT
Start: 2021-04-15 | End: 2021-04-17

## 2021-04-15 RX ORDER — FUROSEMIDE 40 MG/1
80 TABLET ORAL 3 TIMES DAILY
Status: ON HOLD | COMMUNITY
Start: 2021-04-01 | End: 2021-04-21 | Stop reason: SDUPTHER

## 2021-04-15 RX ORDER — POLYETHYLENE GLYCOL 3350 17 G/17G
17 POWDER, FOR SOLUTION ORAL DAILY PRN
Status: DISCONTINUED | OUTPATIENT
Start: 2021-04-15 | End: 2021-04-21 | Stop reason: HOSPADM

## 2021-04-15 RX ORDER — SODIUM CHLORIDE 0.9 % (FLUSH) 0.9 %
5-40 SYRINGE (ML) INJECTION EVERY 8 HOURS
Status: DISCONTINUED | OUTPATIENT
Start: 2021-04-15 | End: 2021-04-21 | Stop reason: HOSPADM

## 2021-04-15 RX ADMIN — FUROSEMIDE 100 MG: 10 INJECTION, SOLUTION INTRAMUSCULAR; INTRAVENOUS at 12:16

## 2021-04-15 RX ADMIN — APIXABAN 5 MG: 5 TABLET, FILM COATED ORAL at 22:58

## 2021-04-15 RX ADMIN — SACUBITRIL AND VALSARTAN 1 TABLET: 49; 51 TABLET, FILM COATED ORAL at 23:47

## 2021-04-15 RX ADMIN — BUMETANIDE 1 MG: 0.25 INJECTION INTRAMUSCULAR; INTRAVENOUS at 22:58

## 2021-04-15 RX ADMIN — NITROGLYCERIN 1 INCH: 20 OINTMENT TOPICAL at 12:16

## 2021-04-15 RX ADMIN — Medication 10 ML: at 22:59

## 2021-04-15 RX ADMIN — METOPROLOL TARTRATE 100 MG: 50 TABLET, FILM COATED ORAL at 22:58

## 2021-04-15 NOTE — ED PROVIDER NOTES
[address] [facility],4Th Floor  [facility] EMERGENCY DEPT    Date: 4/15/2021  Patient Name: John Henry    History of Presenting Illness     Chief Complaint   Patient presents with    Shortness of Breath     67 y.o. female with PMH of A-fib on Eliquis, CVA, HTN, CHF, and COPD who presents to the ER c/o SOB. Patient began feeling worsening fatigue 6 days ago and states that she almost had an episode of syncope 5 days ago. As her fatigue improved she started experiencing nausea and vomiting for 4 days and woke up yesterday SOB. She mentions that over the last few days she has been sleeping with multiple pillows to keep her head elevated because her SOB worsens when she lays flat. She denies CP but has a mild dry cough. She denies fever or chills. No abdominal pain or diarrhea. No dysuria, hematuria, or urinary frequency. She mentions that she incidentally received the COVID-19 vaccine 6 days ago and is concerned her current illness is related. Denies fever, chills, body aches, or other symptoms. Patient denies any other associated signs or symptoms. Patient denies any other complaints. Nursing notes regarding the HPI and triage nursing notes were reviewed. Prior medical records were reviewed. Current Outpatient Medications   Medication Sig Dispense Refill    metOLazone (ZAROXOLYN) 5 mg tablet Take 1 Tab by mouth daily for 2 days. Take 30 minutes before your other medications on 4/16 and 4/17. 2 Tab 0    lidocain-me. salicyl-caps-menth 2.4-81-8.850-7 % ptmd 1 Patch by Apply Externally route two (2) times daily as needed for Pain. 20 Patch 0    lisinopril (PRINIVIL, ZESTRIL) 40 mg tablet TAKE ONE TABLET BY MOUTH EVERY DAY 90 Tab 3    doxazosin (CARDURA) 8 mg tablet Take 1 Tab by mouth two (2) times a day. 180 Tab 6    rosuvastatin (CRESTOR) 5 mg tablet Take 1 Tab by mouth nightly. 90 Tab 3    aspirin delayed-release (ASPIRIN LOW DOSE) 81 mg tablet Take 1 Tab by mouth daily.  100 Tab 2       Past History     Past Medical History:  Past Medical History:   Diagnosis Date    Atrial fibrillation ([facility] [address]     10/11 per Dr Sonia Verde note; converted to SR and of [place] for 3 months already and [facility]    Cerebrovascular accident [facility]) 10/2011    left mu; improved almost completely    Essential hypertension     Essential hypertension, benign     Hard of hearing 3/20/2015    Other and unspecified hyperlipidemia     LDLs are at goal, triglycerides are at goal, HDLs are at goal       Past Surgical History:  Past Surgical History:   Procedure Laterality Date    HX HYSTERECTOMY      total       Family History:  Family History   Problem Relation Age of Onset    High Cholesterol Sister     Hypertension Other         family history of essential hypertension    Heart Attack Neg Hx     Sudden Death Neg Hx        Social History:  Social History     Tobacco Use    Smoking status: Never Smoker    Smokeless tobacco: Never Used   Substance Use Topics    Alcohol use: No     Comment: occasional, quit     Drug use: No       Allergies: Allergies   Allergen Reactions    Tetanus Vaccines And Toxoid Swelling    Penicillins Swelling       Patient's primary care provider (as noted in EPIC):  None    Review of Systems   Constitutional:  Denies fever, chills. +malaise   Head:  Denies injury. Face:  Denies injury or pain. ENMT:  Denies sore throat. Neck:  Denies injury or pain. Chest:  Denies injury. Cardiac:  Denies chest pain or palpitations. Respiratory:  Denies cough, wheezing, or difficulty breathing. + shortness of breath, +cough   GI/ABD:  Denies injury, pain, diarrhea. + nausea, + vomiting   :  Denies injury, pain, dysuria or urgency. Back:  Denies injury or pain. Pelvis:  Denies injury or pain. Extremity/MS:  Denies injury or pain. Neuro:  Denies headache, LOC, dizziness, neurologic symptoms/deficits/paresthesias. Skin: Denies injury, rash, itching or skin changes. All other systems negative as reviewed. Visit Vitals  BP (!) 144/95   Pulse 94   Temp 98.8 °F (37.1 °C)   Resp 24   Ht 5' 6\" (1.676 m)   Wt 93 kg (205 lb)   SpO2 98%   BMI 33.09 kg/m²       PHYSICAL EXAM:    CONSTITUTIONAL:  Alert, in no apparent distress;  well developed;  well nourished. HEAD:  Normocephalic, atraumatic. EYES:  EOMI. Non-icteric sclera. Normal conjunctiva. ENTM:  Nose:  no rhinorrhea. Throat:  mucous membranes moist.  NECK:  Supple  RESPIRATORY:  tachypneic  Crackles heard in bilateral lung bases. Good air movement. CARDIOVASCULAR:  Regular rate and rhythm. No murmurs, rubs, or gallops. GI:  Abdomen soft and non-tender. No rebound or guarding. BACK:  Non-tender. UPPER EXT:  Normal inspection. LOWER EXT:  No edema, no calf tenderness. Distal pulses intact. NEURO:  Moves all four extremities, and grossly normal motor exam.  SKIN:  No rashes;  Normal for age. PSYCH:  Alert and normal affect. DIFFERENTIAL DIAGNOSES/ MEDICAL DECISION MAKING:   Shortness of breath etiologies include chronic obstructive pulmonary disease (COPD), acute asthma exacerbation, congestive heart failure, pneumonia, acute bronchitis, upper respiratory infection, cardiac event to include acute coronary syndrome, acute myocardial infarction or a combination of the above (ex URI on top of COPD thus causing respiratory distress).     ED COURSE:      Recent Results (from the past 12 hour(s))   EKG, 12 LEAD, INITIAL    Collection Time: 04/15/21 10:45 AM   Result Value Ref Range    Ventricular Rate 95 BPM    Atrial Rate 166 BPM    QRS Duration 122 ms    Q-T Interval 390 ms    QTC Calculation (Bezet) 490 ms    Calculated R Axis -43 degrees    Calculated T Axis 148 degrees    Diagnosis       Atrial fibrillation  Left axis deviation  Left ventricular hypertrophy with QRS widening  T wave abnormality, consider lateral ischemia  Abnormal ECG  When compared with ECG of 13-APR-2020 15:23,  Criteria for Septal infarct are no longer present     CBC WITH AUTOMATED DIFF    Collection Time: 04/15/21 11:04 AM   Result Value Ref Range    WBC 7.0 4.6 - 13.2 K/uL    RBC 4.16 (L) 4.20 - 5.30 M/uL    HGB 12.3 12.0 - 16.0 g/dL    HCT 37.2 35.0 - 45.0 %    MCV 89.4 74.0 - 97.0 FL    MCH 29.6 24.0 - 34.0 PG    MCHC 33.1 31.0 - 37.0 g/dL    RDW 15.7 (H) 11.6 - 14.5 %    PLATELET 301 615 - 762 K/uL    MPV 12.1 (H) 9.2 - 11.8 FL    NEUTROPHILS 51 40 - 73 %    LYMPHOCYTES 42 21 - 52 %    MONOCYTES 2 (L) 3 - 10 %    EOSINOPHILS 3 0 - 5 %    BASOPHILS 2 0 - 2 %    ABS. NEUTROPHILS 3.7 1.8 - 8.0 K/UL    ABS. LYMPHOCYTES 2.9 0.9 - 3.6 K/UL    ABS. MONOCYTES 0.1 0.05 - 1.2 K/UL    ABS. EOSINOPHILS 0.2 0.0 - 0.4 K/UL    ABS. BASOPHILS 0.1 0.0 - 0.1 K/UL    DF MANUAL      PLATELET COMMENTS ADEQUATE PLATELETS      RBC COMMENTS NORMOCYTIC, NORMOCHROMIC     METABOLIC PANEL, COMPREHENSIVE    Collection Time: 04/15/21 11:04 AM   Result Value Ref Range    Sodium 141 136 - 145 mmol/L    Potassium 4.2 3.5 - 5.5 mmol/L    Chloride 108 100 - 111 mmol/L    CO2 27 21 - 32 mmol/L    Anion gap 6 3.0 - 18 mmol/L    Glucose 113 (H) 74 - 99 mg/dL    BUN 24 (H) 7.0 - 18 MG/DL    Creatinine 1.20 0.6 - 1.3 MG/DL    BUN/Creatinine ratio 20 12 - 20      GFR est AA 53 (L) >60 ml/min/1.73m2    GFR est non-AA 44 (L) >60 ml/min/1.73m2    Calcium 8.8 8.5 - 10.1 MG/DL    Bilirubin, total 0.7 0.2 - 1.0 MG/DL    ALT (SGPT) 56 13 - 56 U/L    AST (SGOT) 46 (H) 10 - 38 U/L    Alk.  phosphatase 92 45 - 117 U/L    Protein, total 6.9 6.4 - 8.2 g/dL    Albumin 3.3 (L) 3.4 - 5.0 g/dL    Globulin 3.6 2.0 - 4.0 g/dL    A-G Ratio 0.9 0.8 - 1.7     CARDIAC PANEL,(CK, CKMB & TROPONIN)    Collection Time: 04/15/21 11:04 AM   Result Value Ref Range    CK - MB 1.3 <3.6 ng/ml    CK-MB Index 0.7 0.0 - 4.0 %     (H) 26 - 192 U/L    Troponin-I, QT 0.04 0.0 - 0.045 NG/ML   NT-PRO BNP    Collection Time: 04/15/21 11:04 AM   Result Value Ref Range    NT pro-BNP 4,786 (H) 0 - 1,800 PG/ML      Xr Chest Port    Result Date: 4/15/2021  ONE VIEW CHEST RADIOGRAPH INDICATION: SOB. Shortness of breath since this morning. Wheezing. COMPARISON: Chest radiograph 4/13/2020. TECHNIQUE: AP view of the chest FINDINGS:  LINES/TUBES: Left chest wall dual lead cardiac pacemaker device is in stable position. MEDIASTINUM: Stable cardiomegaly. LUNGS: Pulmonary vascular congestion. Hazy opacities at the bilateral lung bases. BONES/OTHER: No acute osseous abnormality. Pulmonary vascular congestion. Hazy opacities at the bases are favored to represent atelectasis. Critical Care Note:  Respiratory Distress    Critical care minutes: 45  MINUTES. Given patient's initial arrival in respiratory distress, numerous serial reevaluations of the patient's respiratory status and patient's response to various medical interventions. Given the patients underlying condition medical intervention(s) were needed, requiring numerous reevaluations of patient's vital signs and response to different emergency department therapies, total bedside time evaluating and/or treating the patient, not including procedures, is noted below. IMPRESSION AND MEDICAL DECISION MAKING:  Patient's O2 sat is within normal limits, but she has tachypnea and is working to breathe after talking. Initial plan was for discharge home with rx for metolazone. Patient stated that she was feeling improved after receiving 100 mg Lasix IV. Upon discharge Chris Wilson stated the patient became extremely short of breath and tachypneic. Plan for admission at this time. Consult with Dr. Aaron Pearce who will accept the patient. Diagnosis:   1.  Acute congestive heart failure, unspecified heart failure type ([address]      Disposition: Admission    JANY Rubalcava

## 2021-04-15 NOTE — ED NOTES
Attempted to call son, no answer at this moment. Patient discharged to go home. Discharge teaching was given to patient, she verbally states understanding.

## 2021-04-15 NOTE — ED NOTES
When attempting to ambulate patient out of the ED, patient began to have SOB with exertion. Patient began to feel slightly dizzy requesting that this nurse sit her down. Provider made aware. Patient now going to be admitted. Patient currently on room air at 98%. Continues to be hypertensive. Provider aware. Patient placed on cardiac monitor and continuos pulse oxygenation to ensure patient status.

## 2021-04-15 NOTE — ED NOTES
Patient able to ambulate self out of ED bed to bathroom. After medication administration of Lasix, bed side commode placed near patient bed. Call light within reach. Instructed patient to use call light prior to getting up out of stretcher.

## 2021-04-15 NOTE — H&P
Medicine History and Physical    Patient: Beverley De León   Age:  67 y.o. Assessment   Principal Problem:    Acute on chronic systolic (congestive) heart failure ([address] (4/15/2021)    Active Problems:    Atrial fibrillation (HCC) ()      Overview: 10/11 per Dr Lupe Alcala note; converted to [facility] and of Multaq for 3 months       already and staying in [facility]      7/14 recurrent;      3/15 on eliquis      Discussed with patient the risk of bleeding on anticoagulation. Patient       understands. Patient also understands the reduced risk of stroke with       anticoagulation due to atrial fibrillation. Start coumadin      HLD (hyperlipidemia) ()      Overview: 3/15 LDL68, taking crestor; Low density lipoproteins (LDLs) are at goal,       triglycerides are at goal, High density lipoproteins (HDLs) are at goal.      6/14 LDLs are at goal, triglycerides are at goal, HDLs are at goal      does not want lipitor anymore            HTN (hypertension) (4/15/2021)      Pacemaker (4/15/2021)          Plan     Acute on chronic systolic HF   - Tele monitor   - bumex BID x 3-4 doses   - strict I/o   - 1200cc restriction   - Consider cardiology consult   - echo ordered   - pacer   - BB ACEI    HTN/HLD   - Continue ACEI/ Statin    PACER      Atrial fibrillation   - currently in sinus   - eliquis   - BB      DISPO  -Pt to be admitted  at this time for reasons addressed above, continued hospitalization for ongoing assessment and treatment indicated     Anticipated Date of Discharge: 1-2 days  Anticipated Disposition (home, SNF) : home    Chief Complaint:   Chief Complaint   Patient presents with    Shortness of Breath         HPI:   Beverley De León is a 67y.o. year old female who presents SOB. She is a very poor historian as she is VERY [place] and may have some cognitive deficit. Per patient she has had a few days of progressive SOB.   She states that since late last week when she had the covid shot she has been under the whether, dizziness, Nausea and arm pain , this has continued and SOB has developed within last few days. She has hx of CHF, pacer, HTN, HLD, afib on eliquis. In ed found to have pulm congestion with elevated bnp. No chest pain no vomiiting      Review of Systems - positive responses in bold type   Constitutional: Negative for fever, chills, diaphoresis and unexpected weight change. HENT: Negative for ear pain, congestion, sore throat, rhinorrhea, drooling, trouble swallowing, neck pain and tinnitus. Eyes: Negative for photophobia, pain, redness and visual disturbance. Respiratory: negative for shortness of breath, cough, choking, chest tightness, wheezing or stridor. Cardiovascular: Negative for chest pain, palpitations and leg swelling. Gastrointestinal: Negative for nausea, vomiting, abdominal pain, diarrhea, constipation, blood in stool, abdominal distention and anal bleeding. Genitourinary: Negative for dysuria, urgency, frequency, hematuria, flank pain and difficulty urinating. Musculoskeletal: Negative for back pain and arthralgias. Skin: Negative for color change, rash and wound. Neurological: Negative for dizziness, seizures, syncope, speech difficulty, light-headedness or headaches. Hematological: Does not bruise/bleed easily.    Psychiatric/Behavioral: Negative for suicidal ideas, hallucinations, behavioral problems, self-injury or agitation       Past Medical History:  Past Medical History:   Diagnosis Date    Atrial fibrillation ([facility] [address]     10/11 per Dr Lani Cleaning note; converted to [facility] and of [place] for 3 months already and staying in [facility]    Cerebrovascular accident [facility]) 10/2011    left mu; improved almost completely    Essential hypertension     Essential hypertension, benign     Hard of hearing 3/20/2015    Other and unspecified hyperlipidemia     LDLs are at goal, triglycerides are at goal, HDLs are at goal       Past Surgical History:  Past Surgical History:   Procedure Laterality Date    HX HYSTERECTOMY      total       Family History:  Family History   Problem Relation Age of Onset    High Cholesterol Sister     Hypertension Other         family history of essential hypertension    Heart Attack Neg Hx     Sudden Death Neg Hx        Social History:  Social History     Socioeconomic History    Marital status: SINGLE     Spouse name: Not on file    Number of children: Not on file    Years of education: Not on file    Highest education level: Not on file   Tobacco Use    Smoking status: Never Smoker    Smokeless tobacco: Never Used   Substance and Sexual Activity    Alcohol use: No     Comment: occasional, quit     Drug use: No       Home Medications:  Prior to Admission medications    Medication Sig Start Date End Date Taking? Authorizing Provider   metOLazone (ZAROXOLYN) 5 mg tablet Take 1 Tab by mouth daily for 2 days. Take 30 minutes before your other medications on 4/16 and 4/17. 4/15/21 4/17/21 Yes Tete Crowe PA   amLODIPine (NORVASC) 10 mg tablet Take 10 mg by mouth daily. 4/1/21   Provider, Historical   Eliquis 5 mg tablet Take 5 mg by mouth two (2) times a day. 3/24/21   Provider, Historical   atorvastatin (LIPITOR) 20 mg tablet Take 20 mg by mouth daily. 4/1/21   Provider, Historical   furosemide (LASIX) 40 mg tablet Take 40 mg by mouth daily. 4/1/21   Provider, Historical   Entresto 49-51 mg tab tablet Take 1 Tab by mouth two (2) times a day. 3/24/21   Provider, Historical   metoprolol tartrate (LOPRESSOR) 100 mg IR tablet Take 100 mg by mouth two (2) times a day. 3/20/21   Provider, Historical   lidocain-me. salicyl-caps-menth 7.8-32-0.841-5 % ptmd 1 Patch by Apply Externally route two (2) times daily as needed for Pain. 1/20/20   Samira Carmona MD   doxazosin (CARDURA) 8 mg tablet Take 1 Tab by mouth two (2) times a day. 4/27/16   Rosa North MD   rosuvastatin (CRESTOR) 5 mg tablet Take 1 Tab by mouth nightly.  4/27/16   Rosa North MD   aspirin delayed-release (ASPIRIN LOW DOSE) 81 mg tablet Take 1 Tab by mouth daily. 2/1/16   Evon Ramsey, NP       Allergies: Allergies   Allergen Reactions    Tetanus Vaccines And Toxoid Swelling    Penicillins Swelling           Physical Exam:     Visit Vitals  BP (!) 144/95   Pulse 94   Temp 98.8 °F (37.1 °C)   Resp 24   Ht 5' 6\" (1.676 m)   Wt 93 kg (205 lb)   SpO2 98%   BMI 33.09 kg/m²       Physical Exam:  General appearance: alert, cooperative, no distress, appears stated age  Head: Normocephalic, without obvious abnormality, atraumatic  Neck: supple, trachea midline  Lungs: few scattered crackles  Heart: regular rate and rhythm, S1, S2 normal, no murmur, click, rub or gallop  Abdomen: soft, non-tender. Bowel sounds normal. No masses,  no organomegaly  Extremities: extremities normal, atraumatic, no cyanosis or NO EDEMA  Skin: Skin color, texture, turgor normal. No rashes or lesions  Neurologic: Grossly normal    Intake and Output:  Current Shift:  No intake/output data recorded. Last three shifts:  No intake/output data recorded.     Lab/Data Reviewed:  CMP:   Lab Results   Component Value Date/Time     04/15/2021 11:04 AM    K 4.2 04/15/2021 11:04 AM     04/15/2021 11:04 AM    CO2 27 04/15/2021 11:04 AM    AGAP 6 04/15/2021 11:04 AM     (H) 04/15/2021 11:04 AM    BUN 24 (H) 04/15/2021 11:04 AM    CREA 1.20 04/15/2021 11:04 AM    GFRAA 53 (L) 04/15/2021 11:04 AM    GFRNA 44 (L) 04/15/2021 11:04 AM    CA 8.8 04/15/2021 11:04 AM    ALB 3.3 (L) 04/15/2021 11:04 AM    TP 6.9 04/15/2021 11:04 AM    GLOB 3.6 04/15/2021 11:04 AM    AGRAT 0.9 04/15/2021 11:04 AM    ALT 56 04/15/2021 11:04 AM     CBC:   Lab Results   Component Value Date/Time    WBC 7.0 04/15/2021 11:04 AM    HGB 12.3 04/15/2021 11:04 AM    HCT 37.2 04/15/2021 11:04 AM     04/15/2021 11:04 AM     All Cardiac Markers in the last 24 hours:   Lab Results   Component Value Date/Time     (H) 04/15/2021 11:04 AM    CKMB 1.3 04/15/2021 11:04 AM    CKND1 0.7 04/15/2021 11:04 AM    TROIQ 0.04 04/15/2021 11:04 AM       Chest X-Ray is obtained; CXR reviewed independently - pulm congestion      Vidal Luna MD    April 15, 2021

## 2021-04-16 ENCOUNTER — APPOINTMENT (OUTPATIENT)
Dept: NON INVASIVE DIAGNOSTICS | Age: 76
DRG: 291 | End: 2021-04-16
Attending: HOSPITALIST
Payer: MEDICARE

## 2021-04-16 LAB
ANION GAP SERPL CALC-SCNC: 9 MMOL/L (ref 3–18)
ATRIAL RATE: 166 BPM
BUN SERPL-MCNC: 25 MG/DL (ref 7–18)
BUN/CREAT SERPL: 22 (ref 12–20)
CALCIUM SERPL-MCNC: 8.6 MG/DL (ref 8.5–10.1)
CALCULATED R AXIS, ECG10: -43 DEGREES
CALCULATED T AXIS, ECG11: 148 DEGREES
CHLORIDE SERPL-SCNC: 106 MMOL/L (ref 100–111)
CK MB CFR SERPL CALC: 1 % (ref 0–4)
CK MB SERPL-MCNC: 1.4 NG/ML (ref 5–25)
CK SERPL-CCNC: 139 U/L (ref 26–192)
CO2 SERPL-SCNC: 24 MMOL/L (ref 21–32)
CREAT SERPL-MCNC: 1.13 MG/DL (ref 0.6–1.3)
DIAGNOSIS, 93000: NORMAL
ECHO LA AREA 4C: 23.85 CM2
ECHO LA VOL 2C: 100.93 ML (ref 22–52)
ECHO LA VOL 4C: 77.49 ML (ref 22–52)
ECHO LA VOL BP: 101.95 ML (ref 22–52)
ECHO LA VOL/BSA BIPLANE: 51.08 ML/M2 (ref 16–28)
ECHO LA VOLUME INDEX A2C: 50.57 ML/M2 (ref 16–28)
ECHO LA VOLUME INDEX A4C: 38.82 ML/M2 (ref 16–28)
ECHO LV E' LATERAL VELOCITY: 5.05 CM/S
ECHO LV E' SEPTAL VELOCITY: 3.5 CM/S
ECHO LVOT PEAK GRADIENT: 1.54 MMHG
ECHO LVOT PEAK VELOCITY: 61.97 CM/S
ECHO LVOT VTI: 9.24 CM
ECHO RV TAPSE: 2.27 CM (ref 1.5–2)
ECHO TV REGURGITANT MAX VELOCITY: 248.89 CM/S
ECHO TV REGURGITANT PEAK GRADIENT: 24.78 MMHG
ERYTHROCYTE [DISTWIDTH] IN BLOOD BY AUTOMATED COUNT: 15.6 % (ref 11.6–14.5)
GLUCOSE SERPL-MCNC: 116 MG/DL (ref 74–99)
HCT VFR BLD AUTO: 38.1 % (ref 35–45)
HGB BLD-MCNC: 12.4 G/DL (ref 12–16)
LVOT MG: 0.77 MMHG
MAGNESIUM SERPL-MCNC: 1.8 MG/DL (ref 1.6–2.6)
MCH RBC QN AUTO: 29.1 PG (ref 24–34)
MCHC RBC AUTO-ENTMCNC: 32.5 G/DL (ref 31–37)
MCV RBC AUTO: 89.4 FL (ref 74–97)
PLATELET # BLD AUTO: 243 K/UL (ref 135–420)
PMV BLD AUTO: 12.6 FL (ref 9.2–11.8)
POTASSIUM SERPL-SCNC: 3.1 MMOL/L (ref 3.5–5.5)
PROCALCITONIN SERPL-MCNC: 0.06 NG/ML
Q-T INTERVAL, ECG07: 390 MS
QRS DURATION, ECG06: 122 MS
QTC CALCULATION (BEZET), ECG08: 490 MS
RBC # BLD AUTO: 4.26 M/UL (ref 4.2–5.3)
SODIUM SERPL-SCNC: 139 MMOL/L (ref 136–145)
TROPONIN I SERPL-MCNC: 0.03 NG/ML (ref 0–0.04)
TSH SERPL DL<=0.05 MIU/L-ACNC: 0.66 UIU/ML (ref 0.36–3.74)
VENTRICULAR RATE, ECG03: 95 BPM
WBC # BLD AUTO: 7.4 K/UL (ref 4.6–13.2)

## 2021-04-16 PROCEDURE — 84145 PROCALCITONIN (PCT): CPT

## 2021-04-16 PROCEDURE — 2709999900 HC NON-CHARGEABLE SUPPLY

## 2021-04-16 PROCEDURE — 97161 PT EVAL LOW COMPLEX 20 MIN: CPT

## 2021-04-16 PROCEDURE — 83735 ASSAY OF MAGNESIUM: CPT

## 2021-04-16 PROCEDURE — 82553 CREATINE MB FRACTION: CPT

## 2021-04-16 PROCEDURE — P9047 ALBUMIN (HUMAN), 25%, 50ML: HCPCS | Performed by: INTERNAL MEDICINE

## 2021-04-16 PROCEDURE — 99233 SBSQ HOSP IP/OBS HIGH 50: CPT | Performed by: INTERNAL MEDICINE

## 2021-04-16 PROCEDURE — 97166 OT EVAL MOD COMPLEX 45 MIN: CPT

## 2021-04-16 PROCEDURE — 65660000000 HC RM CCU STEPDOWN

## 2021-04-16 PROCEDURE — 97116 GAIT TRAINING THERAPY: CPT

## 2021-04-16 PROCEDURE — 97165 OT EVAL LOW COMPLEX 30 MIN: CPT

## 2021-04-16 PROCEDURE — 84443 ASSAY THYROID STIM HORMONE: CPT

## 2021-04-16 PROCEDURE — 74011250637 HC RX REV CODE- 250/637: Performed by: INTERNAL MEDICINE

## 2021-04-16 PROCEDURE — 74011250636 HC RX REV CODE- 250/636: Performed by: INTERNAL MEDICINE

## 2021-04-16 PROCEDURE — 97535 SELF CARE MNGMENT TRAINING: CPT

## 2021-04-16 PROCEDURE — 85027 COMPLETE CBC AUTOMATED: CPT

## 2021-04-16 PROCEDURE — 74011250637 HC RX REV CODE- 250/637: Performed by: HOSPITALIST

## 2021-04-16 PROCEDURE — 80048 BASIC METABOLIC PNL TOTAL CA: CPT

## 2021-04-16 PROCEDURE — 74011000250 HC RX REV CODE- 250: Performed by: HOSPITALIST

## 2021-04-16 PROCEDURE — 36415 COLL VENOUS BLD VENIPUNCTURE: CPT

## 2021-04-16 PROCEDURE — 93306 TTE W/DOPPLER COMPLETE: CPT

## 2021-04-16 RX ORDER — METOPROLOL TARTRATE 50 MG/1
50 TABLET ORAL 2 TIMES DAILY
Status: DISCONTINUED | OUTPATIENT
Start: 2021-04-17 | End: 2021-04-19

## 2021-04-16 RX ORDER — METOPROLOL TARTRATE 50 MG/1
50 TABLET ORAL 2 TIMES DAILY
Status: DISCONTINUED | OUTPATIENT
Start: 2021-04-16 | End: 2021-04-16

## 2021-04-16 RX ORDER — POTASSIUM CHLORIDE 20 MEQ/1
20 TABLET, EXTENDED RELEASE ORAL 2 TIMES DAILY
Status: COMPLETED | OUTPATIENT
Start: 2021-04-16 | End: 2021-04-17

## 2021-04-16 RX ORDER — ALBUMIN HUMAN 250 G/1000ML
25 SOLUTION INTRAVENOUS ONCE
Status: COMPLETED | OUTPATIENT
Start: 2021-04-16 | End: 2021-04-16

## 2021-04-16 RX ORDER — LANOLIN ALCOHOL/MO/W.PET/CERES
400 CREAM (GRAM) TOPICAL DAILY
Status: DISCONTINUED | OUTPATIENT
Start: 2021-04-16 | End: 2021-04-21 | Stop reason: HOSPADM

## 2021-04-16 RX ADMIN — ATORVASTATIN CALCIUM 20 MG: 20 TABLET, FILM COATED ORAL at 10:15

## 2021-04-16 RX ADMIN — APIXABAN 5 MG: 5 TABLET, FILM COATED ORAL at 18:05

## 2021-04-16 RX ADMIN — SACUBITRIL AND VALSARTAN 1 TABLET: 49; 51 TABLET, FILM COATED ORAL at 10:15

## 2021-04-16 RX ADMIN — POTASSIUM CHLORIDE 20 MEQ: 1500 TABLET, EXTENDED RELEASE ORAL at 18:00

## 2021-04-16 RX ADMIN — METOPROLOL TARTRATE 100 MG: 50 TABLET, FILM COATED ORAL at 10:15

## 2021-04-16 RX ADMIN — Medication 10 ML: at 15:42

## 2021-04-16 RX ADMIN — Medication 10 ML: at 22:40

## 2021-04-16 RX ADMIN — APIXABAN 5 MG: 5 TABLET, FILM COATED ORAL at 10:15

## 2021-04-16 RX ADMIN — POTASSIUM CHLORIDE 20 MEQ: 1500 TABLET, EXTENDED RELEASE ORAL at 10:27

## 2021-04-16 RX ADMIN — MAGNESIUM GLUCONATE 500 MG ORAL TABLET 400 MG: 500 TABLET ORAL at 10:27

## 2021-04-16 RX ADMIN — BUMETANIDE 1 MG: 0.25 INJECTION INTRAMUSCULAR; INTRAVENOUS at 10:17

## 2021-04-16 RX ADMIN — AMLODIPINE BESYLATE 10 MG: 10 TABLET ORAL at 10:15

## 2021-04-16 RX ADMIN — ALBUMIN (HUMAN) 25 G: 0.25 INJECTION, SOLUTION INTRAVENOUS at 18:11

## 2021-04-16 RX ADMIN — BUMETANIDE 1 MG: 0.25 INJECTION INTRAMUSCULAR; INTRAVENOUS at 18:02

## 2021-04-16 RX ADMIN — Medication 10 ML: at 06:02

## 2021-04-16 RX ADMIN — Medication 81 MG: at 10:15

## 2021-04-16 NOTE — PROGRESS NOTES
Reason for Admission:  Acute on chronic systolic (congestive) heart failure (HCC) [I50.23]  HTN (hypertension) [I10]  HLD (hyperlipidemia) [E78.5]                 RUR Score:    11            Plan for utilizing home health:    MD states CHF follow up disease manage                      Likelihood of Readmission:   LOW                         Transition of Care Plan:              Initial assessment completed with patient. Cognitive status of patient: Pt very hard of hearin g and difficult to interview oriented to time, place, person and situation. Face sheet information confirmed:  yes. The patient designates son to participate in her discharge plan and to receive any needed information. This patient lives in a single family home with other:  Alone Family close by. Patient is not able to navigate steps as needed. Prior to hospitalization, patient was considered to be independent with ADLs/IADLS : yes . Patient has a current ACP document on file: no Not on file and pt couldn't understand CM so couldn't get answer if has one      Healthcare Decision Maker:     Click here to complete [employer] including selection of the Healthcare Decision Maker Relationship (ie \"Primary\")    The son will be available to transport patient home upon discharge. The patient already has [facility], Shower chair, [facility], and  medical equipment available in the home. Patient is not currently active with home health. Patient has not stayed in a skilled nursing facility or rehab. Was  stay within last 60 days : no. This patient is on dialysis :no      List of available Home Health agencies were provided and reviewed with the patient prior to discharge. Freedom of choice signed: yes, for [place]. Currently, the discharge plan is Home with [address] [place] [address] Vicente Panda. The patient states that she can obtain her medications from the pharmacy, and take her medications as directed.     Patient's current insurance is [employer] Medicare and Medicaid        Care Management Interventions  PCP Verified by CM: Yes([facility])  Mode of Transport at Discharge: Self(son)  Transition of Care Consult (CM Consult): [address]: No  Reason Outside [place]: Managed care specific requirement  Current Support Network: Lives Alone  Confirm Follow Up Transport: Family  The Plan for Transition of Care is Related to the Following Treatment Goals : home  The Patient and/or Patient Representative was Provided with a Choice of Provider and Agrees with the Discharge Plan?: Yes  Freedom of Choice List was Provided with Basic Dialogue that Supports the Patient's Individualized Plan of Care/Goals, Treatment Preferences and Shares the Quality Data Associated with the Providers?: Yes  Discharge Location  Discharge Placement: Home        Harley Zambrano RN BSN  Outcomes Manager    Pager # [phone]

## 2021-04-16 NOTE — ED NOTES
TRANSFER - OUT REPORT:    Verbal report given to Zulema Drew on Lindsey Downing being transferred to room 219. Report consisted of patients Situation, Background, Assessment and   Recommendations(SBAR). Information from the following report was reviewed with the receiving nurse. Lines:   Peripheral IV 04/15/21 Left Wrist (Active)   Site Assessment Clean, dry, & intact 04/15/21 1105   Phlebitis Assessment 0 04/15/21 1105   Infiltration Assessment 0 04/15/21 1105   Dressing Status Clean, dry, & intact 04/15/21 1105   Dressing Type 4 X 4;Transparent 04/15/21 1105   Hub Color/Line Status Blue;Flushed 04/15/21 1105   Alcohol Cap Used Yes 04/15/21 1105        Opportunity for questions and clarification was provided. Patient awaiting transport.

## 2021-04-16 NOTE — PROGRESS NOTES
1800 pt's B/p 99/67. MD notified, hold metoprolol and Daryl. Continue to give Bumex ( given by Robi Rey RN).

## 2021-04-16 NOTE — PROGRESS NOTES
conducted an initial consultation and Spiritual Assessment for Kelsey Tobar, who is a 67 y.o.,female. Patients Primary Language is: [place]. According to the patients EMR Islam Affiliation is: Liliana Gutierres. The reason the Patient came to the hospital is:   Patient Active Problem List    Diagnosis Date Noted    HTN (hypertension) 04/15/2021    Acute on chronic systolic (congestive) heart failure ([address] 04/15/2021    Pacemaker 04/15/2021    Hard of hearing 03/20/2015    Obesity (BMI 30.0-34.9) 10/01/2014    Aortic ectasia ([facility] [address] 07/09/2014    Mitral valve insufficiency and aortic valve insufficiency 07/09/2014    CVA, old, hemiparesis ([facility] [address] 05/14/2014    Dizziness and giddiness 02/03/2014    Hypomagnesemia 07/11/2013    Tinnitus of both ears 04/03/2013    Atrial fibrillation (HCC)     Essential hypertension, benign     HLD (hyperlipidemia)         The  provided the following Interventions:   attempted multiple times to to Initiate a relationship of care and support with patient in room 219 this morning but at each visit attempt found patient resting peacefully now and unable to communicate now. Saul Mores prayer  on patients behalf. Assessment:  Patient does not have any Congregational/cultural needs that will affect patients preferences in health care. There are no further spiritual or Congregational issues which require Spiritual Care Services interventions at this time. Plan:  Chaplains will continue to follow and will provide pastoral care on an as needed/requested basis    . Bebe Lentz   Spiritual Care   [phone]

## 2021-04-16 NOTE — PROGRESS NOTES
OCCUPATIONAL THERAPY EVALUATION/DISCHARGE    Patient: Raji Chao (67 y.o. female)  Date: 4/16/2021  Primary Diagnosis: Acute on chronic systolic (congestive) heart failure (HCC) [I50.23]  HTN (hypertension) [I10]  HLD (hyperlipidemia) [E78.5]        Precautions:   (standard)  PLOF: Pt reports she lives with her family in [facility] [place] [place] 6[place]. Pt reports she occasionally needs help to get in and out of the tub, and to get out of the house, otherwise, she is Modified independent with ADLs and functional  mobility with cane or walker. ASSESSMENT AND RECOMMENDATIONS:  Pt cleared to participate in OT evaluation by RN. Upon entering room, pt received in locked recliner, alert, and agreeable to OT eval/treatment. Pt seen in conjunction with PT for part of the session to maximize safety of patient and staff members. Pt is VERY [place], difficult to acquire information about PLOF. Based on the objective data described below, the patient presents with ability to perform her basic ADLs at/near baseline level of function. Pt benefits from occasional Supervision for functional mobility and standing ADLs, reports she has family at home, available to provide Supervision and assist prn. Pt with NC O2 on, however, not plugged in to the wall. Pt's O2 sats 96% on RA. Following functional mobility pt's O2 sats 100% on RA. Pt is visibly appears SOB, educated on PLB techniques, pt demod understanding. Pt left comfortable in the recliner, all needs within reach, all questions answered. Skilled occupational therapy is not indicated at this time. Discharge Recommendations: Home with Supervision from family  Further Equipment Recommendations for Discharge: N/A      SUBJECTIVE:   Patient stated I take my time.     OBJECTIVE DATA SUMMARY:     Past Medical History:   Diagnosis Date    Atrial fibrillation ([facility] [address]     10/11 per Dr Eloisa Gilmore note; converted to [place] and of [place] for 3 months already and staying in [place]    Cerebrovascular accident [facility]) 10/2011    left mu; improved almost completely    Essential hypertension     Essential hypertension, benign     Hard of hearing 3/20/2015    Other and unspecified hyperlipidemia     LDLs are at goal, triglycerides are at goal, HDLs are at goal     Past Surgical History:   Procedure Laterality Date    HX HYSTERECTOMY      total     Barriers to Learning/Limitations: yes;  sensory deficits-vision/hearing/speech  Compensate with: visual, verbal, tactile, kinesthetic cues/model    Home Situation:   Home Situation  Home Environment: Private residence  # Steps to Enter: 6  One/Two Story Residence: One story  Living Alone: Yes  Support Systems: Family member(s)  Patient Expects to be Discharged to[de-identified] Private residence  Current DME Used/Available at Home: [address], [address], straight, Walker, rolling, Shower chair, Commode, bedside  Tub or Shower Type: Tub/Shower combination  [x]     Right hand dominant   []     Left hand dominant    Cognitive/Behavioral Status:  Neurologic State: Alert  Orientation Level: Oriented X4  Cognition: Follows commands  Safety/Judgement: Awareness of environment    Skin: visible skin intact  Edema: none noted    Vision/Perceptual:       Acuity: Within Defined Limits    Corrective Lenses: Glasses  Coordination: BUE  Coordination: Generally decreased, functional  Fine Motor Skills-Upper: Left Intact; Right Intact    Gross Motor Skills-Upper: Left Intact; Right Intact    Balance:  Sitting: Intact  Standing: Intact; With support  Strength: BUE  Strength: Generally decreased, functional   Tone & Sensation: BUE  Tone: Normal  Sensation: Intact   Range of Motion: BUE  AROM: Generally decreased, functional   Functional Mobility and Transfers for ADLs:  Transfers:     Stand to Sit: Modified independent   Toilet Transfer : Supervision    ADL Assessment:  Feeding: Setup  Oral Facial Hygiene/Grooming: Setup  Bathing: Supervision  Upper Body Dressing: Setup  Lower Body Dressing: Supervision  Toileting: Supervision   ADL Intervention:   Upper Body [address]: Set-up  Shirt simulation with hospital gown: Set-up    Lower Body Dressing Assistance  Underpants: Supervision  Socks: Modified independent    Cognitive Retraining  Safety/Judgement: Awareness of environment  Pain:  Pain level pre-treatment: 0/10   Pain level post-treatment: 0/10     Activity Tolerance:   Fair  Please refer to the flowsheet for vital signs taken during this treatment. After treatment:   [x]  Patient left in no apparent distress sitting up in chair  []  Patient left in no apparent distress in bed  [x]  Call bell left within reach  [x]  Nursing notified  []  Caregiver present  []  Bed alarm activated    COMMUNICATION/EDUCATION:   [x]      Role of Occupational Therapy in the acute care setting  [x]      Home safety education was provided and the patient/caregiver indicated understanding. [x]      Patient/family have participated as able and agree with findings and recommendations. []      Patient is unable to participate in plan of care at this time. Thank you for this referral.  Jillian Lowery OTR/L  Time Calculation: 38 mins      Eval Complexity: History: LOW Complexity : Brief history review ; Examination: LOW Complexity : 1-3 performance deficits relating to physical, cognitive , or psychosocial skils that result in activity limitations and / or participation restrictions ;    Decision Making:LOW Complexity : No comorbidities that affect functional and no verbal or physical assistance needed to complete eval tasks

## 2021-04-16 NOTE — ROUTINE PROCESS
Bedside and Verbal shift change report given to Kristen (oncoming nurse) by Bolivar Rojas RN (offgoing nurse). Report included the following information SBAR, Kardex, Intake/Output, MAR and Recent Results. Patient alert and sitting up in the chair. Call bell in reach.

## 2021-04-16 NOTE — ROUTINE PROCESS
TRANSFER - IN REPORT: 
 
Verbal report received from Florin Tyler (name) on Lindsey Downing  being received from ED (unit) for routine progression of care Report consisted of patients Situation, Background, Assessment and  
Recommendations(SBAR). Information from the following report(s) SBAR, Kardex, Intake/Output, MAR and Recent Results will be reviewed with the receiving nurse. Opportunity for questions and clarification was provided. Assessment completed upon patients arrival to unit and care assumed. 2035 Patient arrival to the unit with the transporter tech. WDL

## 2021-04-16 NOTE — PROGRESS NOTES
Problem: Patient Education: Go to Patient Education Activity  Goal: Patient/Family Education  Outcome: Progressing Towards Goal     Problem: Afib Pathway: Day 1  Goal: Off Pathway (Use only if patient is Off Pathway)  Outcome: Progressing Towards Goal  Goal: Activity/Safety  Outcome: Progressing Towards Goal  Goal: Consults, if ordered  Outcome: Progressing Towards Goal  Goal: Diagnostic Test/Procedures  Outcome: Progressing Towards Goal  Goal: Nutrition/Diet  Outcome: Progressing Towards Goal  Goal: Discharge Planning  Outcome: Progressing Towards Goal  Goal: Medications  Outcome: Progressing Towards Goal  Goal: Respiratory  Outcome: Progressing Towards Goal  Goal: Treatments/Interventions/Procedures  Outcome: Progressing Towards Goal  Goal: Psychosocial  Outcome: Progressing Towards Goal  Goal: *Optimal pain control at patient's stated goal  Outcome: Progressing Towards Goal  Goal: *Hemodynamically stable  Outcome: Progressing Towards Goal  Goal: *Stable cardiac rhythm  Outcome: Progressing Towards Goal  Goal: *Lungs clear or at baseline  Outcome: Progressing Towards Goal  Goal: *Labs within defined limits  Outcome: Progressing Towards Goal  Goal: *Describes available resources and support systems  Outcome: Progressing Towards Goal     Problem: Afib Pathway: Day 2  Goal: Off Pathway (Use only if patient is Off Pathway)  Outcome: Progressing Towards Goal  Goal: Activity/Safety  Outcome: Progressing Towards Goal  Goal: Consults, if ordered  Outcome: Progressing Towards Goal  Goal: Diagnostic Test/Procedures  Outcome: Progressing Towards Goal  Goal: Nutrition/Diet  Outcome: Progressing Towards Goal  Goal: Discharge Planning  Outcome: Progressing Towards Goal  Goal: Medications  Outcome: Progressing Towards Goal  Goal: Respiratory  Outcome: Progressing Towards Goal  Goal: Treatments/Interventions/Procedures  Outcome: Progressing Towards Goal  Goal: Psychosocial  Outcome: Progressing Towards Goal  Goal: *Hemodynamically stable  Outcome: Progressing Towards Goal  Goal: *Optimal pain control at patient's stated goal  Outcome: Progressing Towards Goal  Goal: *Stable cardiac rhythm  Outcome: Progressing Towards Goal  Goal: *Lungs clear or at baseline  Outcome: Progressing Towards Goal  Goal: *Describes available resources and support systems  Outcome: Progressing Towards Goal     Problem: Afib Pathway: Day 3  Goal: Off Pathway (Use only if patient is Off Pathway)  Outcome: Progressing Towards Goal  Goal: Activity/Safety  Outcome: Progressing Towards Goal  Goal: Diagnostic Test/Procedures  Outcome: Progressing Towards Goal  Goal: Nutrition/Diet  Outcome: Progressing Towards Goal  Goal: Discharge Planning  Outcome: Progressing Towards Goal  Goal: Medications  Outcome: Progressing Towards Goal  Goal: Respiratory  Outcome: Progressing Towards Goal  Goal: Treatments/Interventions/Procedures  Outcome: Progressing Towards Goal  Goal: Psychosocial  Outcome: Progressing Towards Goal     Problem: Afib: Discharge Outcomes (not in CAT)  Goal: *Hemodynamically stable  Outcome: Progressing Towards Goal  Goal: *Stable cardiac rhythm  Outcome: Progressing Towards Goal  Goal: *Lungs clear or at baseline  Outcome: Progressing Towards Goal  Goal: *Optimal pain control at patient's stated goal  Outcome: Progressing Towards Goal  Goal: *Identifies cardiac risk factors  Outcome: Progressing Towards Goal  Goal: *Verbalizes home exercise program, activity guidelines, cardiac precautions  Outcome: Progressing Towards Goal  Goal: *Verbalizes understanding and describes prescribed diet  Outcome: Progressing Towards Goal  Goal: *Verbalizes understanding and describes medication purposes and frequencies  Outcome: Progressing Towards Goal  Goal: *Anxiety reduced or absent  Outcome: Progressing Towards Goal  Goal: *Understands and describes signs and symptoms to report to providers(Stroke Metric)  Outcome: Progressing Towards Goal  Goal: *Describes follow-up/return visits to physicians  Outcome: Progressing Towards Goal  Goal: *Describes available resources and support systems  Outcome: Progressing Towards Goal  Goal: *Influenza immunization  Outcome: Progressing Towards Goal  Goal: *Pneumococcal immunization  Outcome: Progressing Towards Goal  Goal: *Describes smoking cessation resources  Outcome: Progressing Towards Goal     Problem: Hypertension  Goal: *Blood pressure within specified parameters  Outcome: Progressing Towards Goal  Goal: *Fluid volume balance  Outcome: Progressing Towards Goal  Goal: *Labs within defined limits  Outcome: Progressing Towards Goal     Problem: Patient Education: Go to Patient Education Activity  Goal: Patient/Family Education  Outcome: Progressing Towards Goal     Problem: Pain  Goal: *Control of Pain  Outcome: Progressing Towards Goal     Problem: Patient Education: Go to Patient Education Activity  Goal: Patient/Family Education  Outcome: Progressing Towards Goal     Problem: Patient Education: Go to Patient Education Activity  Goal: Patient/Family Education  Outcome: Progressing Towards Goal     Problem: Pacer/ICD: Pre-Procedure  Goal: Off Pathway (Use only if patient is Off Pathway)  Outcome: Progressing Towards Goal  Goal: Activity/Safety  Outcome: Progressing Towards Goal  Goal: Consults, if ordered  Outcome: Progressing Towards Goal  Goal: Diagnostic Test/Procedures  Outcome: Progressing Towards Goal  Goal: Nutrition/Diet  Outcome: Progressing Towards Goal  Goal: Discharge Planning  Outcome: Progressing Towards Goal  Goal: Medications  Outcome: Progressing Towards Goal  Goal: Respiratory  Outcome: Progressing Towards Goal  Goal: Treatments/Interventions/Procedures  Outcome: Progressing Towards Goal  Goal: Psychosocial  Outcome: Progressing Towards Goal  Goal: *Verbalize description of procedure  Outcome: Progressing Towards Goal  Goal: *Consent signed  Outcome: Progressing Towards Goal     Problem: Pacer/ICD: Post-Procedure  Goal: Off Pathway (Use only if patient is Off Pathway)  Outcome: Progressing Towards Goal  Goal: Activity/Safety  Outcome: Progressing Towards Goal  Goal: Consults, if ordered  Outcome: Progressing Towards Goal  Goal: Diagnostic Test/Procedures  Outcome: Progressing Towards Goal  Goal: Nutrition/Diet  Outcome: Progressing Towards Goal  Goal: Discharge Planning  Outcome: Progressing Towards Goal  Goal: Medications  Outcome: Progressing Towards Goal  Goal: Respiratory  Outcome: Progressing Towards Goal  Goal: Treatments/Interventions/Procedures  Outcome: Progressing Towards Goal  Goal: Psychosocial  Outcome: Progressing Towards Goal  Goal: *Hemodynamically stable  Outcome: Progressing Towards Goal  Goal: *Optimal pain control at patient's stated goal  Outcome: Progressing Towards Goal  Goal: *Absence of signs and symptoms of infection or wound complication  Outcome: Progressing Towards Goal     Problem: Pacer/ICD: Day 1  Goal: Off Pathway (Use only if patient is Off Pathway)  Outcome: Progressing Towards Goal  Goal: Activity/Safety  Outcome: Progressing Towards Goal  Goal: Diagnostic Test/Procedures  Outcome: Progressing Towards Goal  Goal: Nutrition/Diet  Outcome: Progressing Towards Goal  Goal: Discharge Planning  Outcome: Progressing Towards Goal  Goal: Medications  Outcome: Progressing Towards Goal  Goal: Respiratory  Outcome: Progressing Towards Goal  Goal: Treatments/Interventions/Procedures  Outcome: Progressing Towards Goal  Goal: Psychosocial  Outcome: Progressing Towards Goal     Problem: Pacer/ICD: Discharge Outcomes  Goal: *Hemodynamically stable  Outcome: Progressing Towards Goal  Goal: *Stable cardiac rhythm  Outcome: Progressing Towards Goal  Goal: *Lungs clear or at baseline  Outcome: Progressing Towards Goal  Goal: *Demonstrates ability to perform prescribed activity without shortness of breath or discomfort  Outcome: Progressing Towards Goal  Goal: *Verbalizes home exercise program, activity guidelines, cardiac precautions  Outcome: Progressing Towards Goal  Goal: *Verbalizes understanding and describes prescribed diet  Outcome: Progressing Towards Goal  Goal: *Verbalizes understanding and describes medication purposes and frequencies  Outcome: Progressing Towards Goal  Goal: *Identifies cardiac risk factors  Outcome: Progressing Towards Goal  Goal: *No signs and symptoms of infection or wound complications  Outcome: Progressing Towards Goal  Goal: *Anxiety reduced or absent  Outcome: Progressing Towards Goal  Goal: *Understands and describes signs and symptoms to report to providers(Stroke Metric)  Outcome: Progressing Towards Goal  Goal: *Describes follow-up/return visits to physicians  Outcome: Progressing Towards Goal  Goal: *Describes available resources and support systems  Outcome: Progressing Towards Goal  Goal: *Influenza immunization  Outcome: Progressing Towards Goal  Goal: *Pneumococcal immunization  Outcome: Progressing Towards Goal  Goal: *Optimal pain control at patient's stated goal  Outcome: Progressing Towards Goal

## 2021-04-16 NOTE — PROGRESS NOTES
Bedside report received from pt, no complaint voiced, pt sitting in a recliner. [id number] Ser pt with severe allergy symptoms such as sinus congestion associated with difficulty breathing. VSS, pt assisted back to the recliner, call light within pt's reach. 0211: Pt assisted back to bed, used BSC, output documented, see flowsheet.   4992: Pt asleep at this moment, call light within pt's reach,

## 2021-04-16 NOTE — PROGRESS NOTES
Problem: Mobility Impaired (Adult and Pediatric)  Goal: *Acute Goals and Plan of Care (Insert Text)  Description: Physical Therapy Goals  Initiated 4/16/2021 and to be accomplished within 7 day(s)  1. Patient will move from supine to sit and sit to supine , scoot up and down, and roll side to side in bed with modified independence. 2.  Patient will transfer from bed to chair and chair to bed with modified independence using the least restrictive device. 3.  Patient will perform sit to stand with modified independence. 4.  Patient will ambulate with supervision/set-up for 150 feet with the least restrictive device. 5.  Patient will ascend/descend 6 stairs with unilateral handrail(s) with minimal assistance/contact guard assist.       PLOF: Pt extremely hard of hearing, reporting 6 [place]ILL[place] home in 1 story house, has RW/cane mobility, son and his family. Outcome: Progressing Towards Goal    PHYSICAL THERAPY EVALUATION    Patient: Hali Romero (67 y.o. female)  Date: 4/16/2021  Primary Diagnosis: Acute on chronic systolic (congestive) heart failure (HCC) [I50.23]  HTN (hypertension) [I10]  HLD (hyperlipidemia) [E78.5]        Precautions:   (standard)    PLOF: see above     ASSESSMENT :  Pt cleared to participate in PT session, pt received semi-reclined in recliner and agreeable to therapy session. Pt extremely hard of hearing, reporting she hears better out of her R ear. Completing with OT to maximize safety and mobility. Based on the objective data described below, the patient presents with decreased endurance, decreased balance reactions, gait deviations, and decreased independence in functional mobility. Pt with NC in nose but not connected to wall, SPO2 at 98% at rest. Pt completing sit to stand with supervision to RW, ambulating x100 feet with SBA. Pt returning to chair reporting fatigue, SPO2 at 100% at end of ambulation. Pt demonstrating good LE strength during gross MMT.   Pt positioned for comfort and educated to call for assist before getting up, pt verbalized understanding. Pt left with all needs met and call bell in reach. RN notified of position and participation. Pt would benefit from 1-2 more PT sessions, for stair training and ambulation for safe discharge home. Patient will benefit from skilled intervention to address the above impairments. Patient's rehabilitation potential is considered to be Good  Factors which may influence rehabilitation potential include:   []         None noted  []         Mental ability/status  []         Medical condition  [x]         Home/family situation and support systems  []         Safety awareness  []         Pain tolerance/management  []         Other:      PLAN :  Recommendations and Planned Interventions:   [x]           Bed Mobility Training             []    Neuromuscular Re-Education  [x]           Transfer Training                   []    Orthotic/Prosthetic Training  [x]           Gait Training                          []    Modalities  [x]           Therapeutic Exercises           []    Edema Management/Control  [x]           Therapeutic Activities            [x]    Family Training/Education  [x]           Patient Education  []           Other (comment):    Frequency/Duration: Patient will be followed by physical therapy 1-2 times per day/4-7 days per week to address goals. Discharge Recommendations: Home Health safety check  Further Equipment Recommendations for Discharge: rolling walker     SUBJECTIVE:   Patient stated When you get old you won't be able to hear either.     OBJECTIVE DATA SUMMARY:     Past Medical History:   Diagnosis Date    Atrial fibrillation ([facility] [address]     10/11 per Dr Emile Faye note; converted to [place] and of [place] for 3 months already and staying in [place] [facility] [place]    Cerebrovascular accident [facility]) 10/2011    left mu; improved almost completely    Essential hypertension     Essential hypertension, benign     Hard of hearing 3/20/2015    Other and unspecified hyperlipidemia     LDLs are at goal, triglycerides are at goal, HDLs are at goal     Past Surgical History:   Procedure Laterality Date    HX HYSTERECTOMY      total     Barriers to Learning/Limitations: [place]  Compensate with: Visual Cues and Tactile Cues  Home Situation:  Home Situation  Home Environment: Private residence  # Steps to Enter: 6  One/Two Story Residence: One story  Living Alone: Yes  Support Systems: Family member(s)  Patient Expects to be Discharged to[de-identified] Private residence  Current DME Used/Available at Home: [address], [address], straight, Walker, rolling, Shower chair, Commode, bedside  Tub or Shower Type: Tub/Shower combination  Critical Behavior:  Neurologic State: Alert  Orientation Level: Oriented X4  Cognition: Follows commands  Safety/Judgement: Awareness of environment  Psychosocial  Purposeful Interaction: Yes  Pt Identified Daily Priority: Clinical issues (comment)  [employer] Process: Attend basic human needs;Create healing environment  Caring Interventions: Therapeutic modalities; Reassure  Reassure: (P) Therapeutic listening;Caring rounds  Therapeutic Modalities: (P) Humor; Intentional therapeutic touch    Strength:    Strength: Generally decreased, functional    Tone & Sensation:   Tone: Normal    Sensation: Intact  Range Of Motion:  AROM: Within functional limits    Posture:  Posture (WDL): Within defined limits     Functional Mobility:  Bed Mobility:     Supine to Sit: (sitting in recliner upon arrival )     Scooting: Stand-by assistance  Transfers:     Stand to Sit: Modified independent    Balance:   Sitting: Intact  Standing: Intact; With support    Ambulation/Gait Training:  Distance (ft): 100 Feet (ft)  Assistive Device: Walker, rolling  Ambulation - Level of Assistance: Stand-by assistance     Gait Description (WDL): Exceptions to WDL  Gait Abnormalities: Decreased step clearance    Base of Support: Widened     Speed/Shweta: Slow  Step Length: Right shortened;Left shortened Pain:  Pain level pre-treatment: 0/10   Pain level post-treatment: 0/10       Activity Tolerance:   Good activity tolerance     Please refer to the flowsheet for vital signs taken during this treatment. After treatment:   []         Patient left in no apparent distress sitting up in chair  [x]         Patient left in no apparent distress in bed  [x]         Call bell left within reach  [x]         Nursing notified  []         Caregiver present  []         Bed alarm activated  []         SCDs applied    COMMUNICATION/EDUCATION:   [x]         Role of Physical Therapy in the acute care setting. [x]         Fall prevention education was provided and the patient/caregiver indicated understanding. [x]         Patient/family have participated as able in goal setting and plan of care. [x]         Patient/family agree to work toward stated goals and plan of care. []         Patient understands intent and goals of therapy, but is neutral about his/her participation. []         Patient is unable to participate in goal setting/plan of care: ongoing with therapy staff.  []         Other:     Thank you for this referral.  Herbert Fuller, PT   Time Calculation: 29 mins      Eval Complexity: History: MEDIUM  Complexity : 1-2 comorbidities / personal factors will impact the outcome/ POC Exam:LOW Complexity : 1-2 Standardized tests and measures addressing body structure, function, activity limitation and / or participation in recreation  Presentation: LOW Complexity : Stable, uncomplicated  Clinical Decision Making:Low Complexity low  Overall Complexity:LOW

## 2021-04-16 NOTE — PROGRESS NOTES
Hospitalist Progress Note    Patient: Hali Romero Age: 67 y.o. : 1945 MR#: [id number] SSN: [id number]  Date/Time: 2021 10:15 AM    DOA: 4/15/2021  PCP: None    Subjective:     She continues to stated that she is with shortness of breath. Though nursing check that she does not have hypoxia on room air. She feels better with oxygen supplement   Otherwise, her Echo is pending. [facility] record indicated that she has underlying non-ischemic cardiomyopathy with ICD placed in 2018. She has been on Eliquis for persistent afib. Negative cardiac enzyme       Interval Hospital Course:  67 y.o female with HTN, nonischemic cardiomyopathy, ICD, persistent afib on Eliquis, h/o CVA with left-sided hemiparesis, presented from home with feeling of fatigue and shortness of breath. She was recently with COVID-19 vaccination. No fever. In the ER, she was found to have vascular congestion with elevated proBNP and troponin. She was started on diuresis. ROS:  No current fever/chills, no headache, no dizziness, no facial pain, no sinus congestion,   No swallowing pain, No chest pain, no palpitation, + shortness of breath, no abd pain,  No diarrhea, no urinary complaint, no leg pain or swelling      Assessment/Plan:   1. Acute on chronic systolic CHF  2. Non-ischemic cardiomyopathy with present of ICD   3. Persistent AFIB, on Eliquis   4. Indeterminate troponin elevation, likely demand ischemia due to CHF   5. Hypertension   6. Hyperlipidemia  7. Morbid obesity   8. Hypokalemia    9. Hard of hearing     Continue with IV diuretic. Can hold her antihypertensive if BP is low. Echo follow up though she has baseline EF of 20-29%  She needs PT/OT   Continue with her current home medications.    Can follow up with her [facility] cardiology   Replace electrolytes   pepcid added     Full code    Additional Notes:   Time spent >35 minutes     Case discussed with:  [x]Patient  []Family  []Nursing  []Case Management  DVT Prophylaxis:  []Lovenox  [x]Eliquis  []SCDs  []Coumadin   []On Heparin gtt    Signed By: Emani Buchanan MD     2021 10:15 AM              Objective:   VS:   Visit Vitals  /87 (BP 1 Location: Left upper arm, BP Patient Position: At rest)   Pulse 72   Temp 97.5 °F (36.4 °C)   Resp 20   Ht 5' 6\" (1.676 m)   Wt 90.3 kg (199 lb)   SpO2 99%   Breastfeeding Unknown   BMI 32.12 kg/m²      Tmax/24hrs: Temp (24hrs), Av.6 °F (36.4 °C), Min:96.5 °F (35.8 °C), Max:98.8 °F (37.1 °C)      Intake/Output Summary (Last 24 hours) at 2021 1015  Last data filed at 2021 0946  Gross per 24 hour   Intake 280 ml   Output 450 ml   Net -170 ml       Tele: afib  General:  Cooperative, Not in acute distress, speaks in full sentence while in bed  HEENT: PERRL, EOMI, supple neck, no JVD, dry oral mucosa  Cardiovascular: S1S2 irregular, no rub/gallop   Pulmonary: Clear air entry bilaterally, no wheezing, + crackle  GI:  Soft, non tender, non distended, +bs, no guarding   Extremities:  No pedal edema, +distal pulses appreciated   Neuro: AOx3, moving all extremities, no gross deficit.      Additional:       Current Facility-Administered Medications   Medication Dose Route Frequency    amLODIPine (NORVASC) tablet 10 mg  10 mg Oral DAILY    aspirin delayed-release tablet 81 mg  81 mg Oral DAILY    atorvastatin (LIPITOR) tablet 20 mg  20 mg Oral DAILY    apixaban (ELIQUIS) tablet 5 mg  5 mg Oral BID    sacubitriL-valsartan (ENTRESTO) 49-51 mg tablet 1 Tab  1 Tab Oral BID    metoprolol tartrate (LOPRESSOR) tablet 100 mg  100 mg Oral BID    bumetanide (BUMEX) injection 1 mg  1 mg IntraVENous BID    sodium chloride (NS) flush 5-40 mL  5-40 mL IntraVENous Q8H    sodium chloride (NS) flush 5-40 mL  5-40 mL IntraVENous PRN    acetaminophen (TYLENOL) tablet 650 mg  650 mg Oral Q6H PRN    Or    acetaminophen (TYLENOL) suppository 650 mg  650 mg Rectal Q6H PRN    polyethylene glycol (MIRALAX) packet 17 g  17 g Oral DAILY PRN    promethazine (PHENERGAN) tablet 12.5 mg  12.5 mg Oral Q6H PRN    Or    ondansetron (ZOFRAN) injection 4 mg  4 mg IntraVENous Q6H PRN            Lab/Data Review:  Labs: Results:       Chemistry Recent Labs     04/16/21  0533 04/15/21  1104   * 113*    141   K 3.1* 4.2    108   CO2 24 27   BUN 25* 24*   CREA 1.13 1.20   BUCR 22* 20   AGAP 9 6   CA 8.6 8.8     Recent Labs     04/15/21  1104   ALT 56   TP 6.9   ALB 3.3*   GLOB 3.6   AGRAT 0.9      CBC w/Diff Recent Labs     04/16/21  0533 04/15/21  1104   WBC 7.4 7.0   RBC 4.26 4.16*   HGB 12.4 12.3   HCT 38.1 37.2   MCV 89.4 89.4   MCH 29.1 29.6   MCHC 32.5 33.1   RDW 15.6* 15.7*    227   GRANS  --  51   LYMPH  --  42   EOS  --  3      Coagulation No results for input(s): PTP, INR, APTT, INREXT in the last 72 hours. Iron/Ferritin Lab Results   Component Value Date/Time    Iron 60 06/03/2014 12:00 AM    TIBC 316 06/03/2014 12:00 AM    Iron % saturation 19 06/03/2014 12:00 AM       BNP    Cardiac Enzymes Lab Results   Component Value Date/Time     (H) 04/15/2021 11:04 AM    CK - MB 1.3 04/15/2021 11:04 AM    CK-MB Index 0.7 04/15/2021 11:04 AM    Troponin-I, QT 0.04 04/15/2021 11:04 AM        Lactic Acid    Thyroid Studies          All Micro Results     None            Images:    CT (Most Recent). XRAY (Most Recent) XR Results (most recent):  Results from Hospital Encounter encounter on 04/15/21   XR CHEST PORT    Narrative ONE VIEW CHEST RADIOGRAPH     INDICATION: SOB. Shortness of breath since this morning. Wheezing. COMPARISON: Chest radiograph 4/13/2020. TECHNIQUE: AP view of the chest    FINDINGS:     LINES/TUBES: Left chest wall dual lead cardiac pacemaker device is in stable  position. MEDIASTINUM: Stable cardiomegaly. LUNGS: Pulmonary vascular congestion. Hazy opacities at the bilateral lung  bases. BONES/OTHER: No acute osseous abnormality. Impression Pulmonary vascular congestion.     Hazy opacities at the bases are favored to represent atelectasis. EKG No results found for this or any previous visit.      2D ECHO

## 2021-04-17 ENCOUNTER — APPOINTMENT (OUTPATIENT)
Dept: GENERAL RADIOLOGY | Age: 76
DRG: 291 | End: 2021-04-17
Attending: INTERNAL MEDICINE
Payer: MEDICARE

## 2021-04-17 LAB
ANION GAP SERPL CALC-SCNC: 6 MMOL/L (ref 3–18)
BNP SERPL-MCNC: 2687 PG/ML (ref 0–1800)
BUN SERPL-MCNC: 29 MG/DL (ref 7–18)
BUN/CREAT SERPL: 22 (ref 12–20)
CALCIUM SERPL-MCNC: 8.4 MG/DL (ref 8.5–10.1)
CHLORIDE SERPL-SCNC: 106 MMOL/L (ref 100–111)
CO2 SERPL-SCNC: 27 MMOL/L (ref 21–32)
CREAT SERPL-MCNC: 1.34 MG/DL (ref 0.6–1.3)
ERYTHROCYTE [DISTWIDTH] IN BLOOD BY AUTOMATED COUNT: 15.5 % (ref 11.6–14.5)
GLUCOSE SERPL-MCNC: 105 MG/DL (ref 74–99)
HCT VFR BLD AUTO: 43.9 % (ref 35–45)
HGB BLD-MCNC: 13.5 G/DL (ref 12–16)
MAGNESIUM SERPL-MCNC: 2 MG/DL (ref 1.6–2.6)
MCH RBC QN AUTO: 28 PG (ref 24–34)
MCHC RBC AUTO-ENTMCNC: 30.8 G/DL (ref 31–37)
MCV RBC AUTO: 91.1 FL (ref 74–97)
PLATELET # BLD AUTO: 262 K/UL (ref 135–420)
PMV BLD AUTO: 12.9 FL (ref 9.2–11.8)
POTASSIUM SERPL-SCNC: 3.9 MMOL/L (ref 3.5–5.5)
RBC # BLD AUTO: 4.82 M/UL (ref 4.2–5.3)
SODIUM SERPL-SCNC: 139 MMOL/L (ref 136–145)
WBC # BLD AUTO: 6.9 K/UL (ref 4.6–13.2)

## 2021-04-17 PROCEDURE — 36415 COLL VENOUS BLD VENIPUNCTURE: CPT

## 2021-04-17 PROCEDURE — 85027 COMPLETE CBC AUTOMATED: CPT

## 2021-04-17 PROCEDURE — 74011250637 HC RX REV CODE- 250/637: Performed by: INTERNAL MEDICINE

## 2021-04-17 PROCEDURE — 65660000000 HC RM CCU STEPDOWN

## 2021-04-17 PROCEDURE — 99232 SBSQ HOSP IP/OBS MODERATE 35: CPT | Performed by: INTERNAL MEDICINE

## 2021-04-17 PROCEDURE — 74011250637 HC RX REV CODE- 250/637: Performed by: HOSPITALIST

## 2021-04-17 PROCEDURE — 83880 ASSAY OF NATRIURETIC PEPTIDE: CPT

## 2021-04-17 PROCEDURE — 77010033678 HC OXYGEN DAILY

## 2021-04-17 PROCEDURE — 80048 BASIC METABOLIC PNL TOTAL CA: CPT

## 2021-04-17 PROCEDURE — 83735 ASSAY OF MAGNESIUM: CPT

## 2021-04-17 PROCEDURE — 71046 X-RAY EXAM CHEST 2 VIEWS: CPT

## 2021-04-17 PROCEDURE — 2709999900 HC NON-CHARGEABLE SUPPLY

## 2021-04-17 RX ORDER — MONTELUKAST SODIUM 10 MG/1
10 TABLET ORAL DAILY
Status: DISCONTINUED | OUTPATIENT
Start: 2021-04-17 | End: 2021-04-18

## 2021-04-17 RX ORDER — FLUTICASONE PROPIONATE 50 MCG
2 SPRAY, SUSPENSION (ML) NASAL DAILY
Status: DISCONTINUED | OUTPATIENT
Start: 2021-04-17 | End: 2021-04-21 | Stop reason: HOSPADM

## 2021-04-17 RX ADMIN — POTASSIUM CHLORIDE 20 MEQ: 1500 TABLET, EXTENDED RELEASE ORAL at 18:31

## 2021-04-17 RX ADMIN — Medication 10 ML: at 15:26

## 2021-04-17 RX ADMIN — APIXABAN 5 MG: 5 TABLET, FILM COATED ORAL at 18:32

## 2021-04-17 RX ADMIN — MAGNESIUM GLUCONATE 500 MG ORAL TABLET 400 MG: 500 TABLET ORAL at 10:11

## 2021-04-17 RX ADMIN — FLUTICASONE PROPIONATE 2 SPRAY: 50 SPRAY, METERED NASAL at 15:26

## 2021-04-17 RX ADMIN — Medication 10 ML: at 23:58

## 2021-04-17 RX ADMIN — Medication 10 ML: at 06:05

## 2021-04-17 RX ADMIN — POTASSIUM CHLORIDE 20 MEQ: 1500 TABLET, EXTENDED RELEASE ORAL at 10:11

## 2021-04-17 RX ADMIN — METOPROLOL TARTRATE 50 MG: 50 TABLET, FILM COATED ORAL at 10:21

## 2021-04-17 RX ADMIN — MONTELUKAST 10 MG: 10 TABLET, FILM COATED ORAL at 12:44

## 2021-04-17 RX ADMIN — ATORVASTATIN CALCIUM 20 MG: 20 TABLET, FILM COATED ORAL at 10:11

## 2021-04-17 RX ADMIN — Medication 81 MG: at 10:10

## 2021-04-17 RX ADMIN — ACETAMINOPHEN 650 MG: 325 TABLET ORAL at 20:15

## 2021-04-17 RX ADMIN — APIXABAN 5 MG: 5 TABLET, FILM COATED ORAL at 10:11

## 2021-04-17 NOTE — PROGRESS NOTES
Hospitalist Progress Note    Patient: Hali Romero Age: 67 y.o. : 1945 MR#: [id number] SSN: [id number]  Date/Time: 2021 11:58 AM    DOA: 4/15/2021  PCP: Luis F, MD Bobby    Subjective:   She stated that she still not feeling good  She has congestion of her nose and wants something for allergy. Otherwise, she is still using her oxygen supplement. Renals function is slightly elevated. Her Entresto had Bumex held. [facility] record indicated that she has underlying non-ischemic cardiomyopathy with ICD placed in 2018. She has been on Eliquis for persistent afib. Negative cardiac enzyme       Interval Hospital Course:  67 y.o female with HTN, nonischemic cardiomyopathy, ICD, persistent afib on Eliquis, h/o CVA with left-sided hemiparesis, presented from home with feeling of fatigue and shortness of breath. She was recently with COVID-19 vaccination. No fever. In the ER, she was found to have vascular congestion with elevated proBNP and troponin. She was started on diuresis. ROS:  No current fever/chills, no headache, no dizziness, no facial pain, no sinus congestion,   No swallowing pain, No chest pain, no palpitation, + shortness of breath, no abd pain,  No diarrhea, no urinary complaint, no leg pain or swelling    Assessment/Plan:     1. Acute on chronic systolic CHF  2. Non-ischemic cardiomyopathy with present of ICD   3. Persistent AFIB, on Eliquis   4. Indeterminate troponin elevation, likely demand ischemia due to CHF   5. Hypertension   6. Hyperlipidemia  7. Morbid obesity   8. Hypokalemia    9. Hard of hearing   10. Acute renal insufficiency from over diuresis       HOLD off her IV bumex, can resume at oral form, check her BMP closely. Avoid nephrotoxic medications for now. Check bladder scan for obstruction   Can hold her antihypertensive if BP is low. Echo follow up though she has baseline EF of 20-29%  She needs PT/OT   Add singulair and flonase for today.  Spoke with nursing to ambulate her and record her oxygen need while exertion on room air   Continue with her current home medications. Can follow up with her [facility] cardiology   Replace electrolytes   pepcid      Full code    Additional Notes:   Time spent >35 minutes     Case discussed with:  [x]Patient  []Family  [x]Nursing  [x]Case Management  DVT Prophylaxis:  []Lovenox  [x]Eliquis  []SCDs  []Coumadin   []On Heparin gtt    Signed By: Drake Felipe MD     2021 11:58 AM              Objective:   VS:   Visit Vitals  BP (!) 150/62 (BP 1 Location: Right lower arm, BP Patient Position: At rest) Comment: Reported to RN Desi   Pulse 79   Temp 98.1 °F (36.7 °C)   Resp 20   Ht 5' 6\" (1.676 m)   Wt 93.5 kg (206 lb 1.6 oz)   SpO2 98%   Breastfeeding Unknown   BMI 33.27 kg/m²      Tmax/24hrs: Temp (24hrs), Av.8 °F (36.6 °C), Min:97.1 °F (36.2 °C), Max:98.9 °F (37.2 °C)      Intake/Output Summary (Last 24 hours) at 2021 1159  Last data filed at 2021 0456  Gross per 24 hour   Intake 320 ml   Output 725 ml   Net -405 ml       Tele: afib  General:  Cooperative, Not in acute distress, speaks in full sentence while in bed  HEENT: PERRL, EOMI, supple neck, no JVD, dry oral mucosa  Cardiovascular: S1S2 irregular, no rub/gallop   Pulmonary: Clear air entry bilaterally, no wheezing, + crackle  GI:  Soft, non tender, non distended, +bs, no guarding   Extremities:  No pedal edema, +distal pulses appreciated   Neuro: AOx3, moving all extremities, no gross deficit.      Additional:       Current Facility-Administered Medications   Medication Dose Route Frequency    montelukast (SINGULAIR) tablet 10 mg  10 mg Oral DAILY    fluticasone propionate (FLONASE) 50 mcg/actuation nasal spray 2 Spray  2 Spray Both Nostrils DAILY    potassium chloride (K-DUR, KLOR-CON) SR tablet 20 mEq  20 mEq Oral BID    magnesium oxide (MAG-OX) tablet 400 mg  400 mg Oral DAILY    metoprolol tartrate (LOPRESSOR) tablet 50 mg  50 mg Oral BID    [Held by provider] amLODIPine (NORVASC) tablet 10 mg  10 mg Oral DAILY    aspirin delayed-release tablet 81 mg  81 mg Oral DAILY    atorvastatin (LIPITOR) tablet 20 mg  20 mg Oral DAILY    apixaban (ELIQUIS) tablet 5 mg  5 mg Oral BID    [Held by provider] sacubitriL-valsartan (ENTRESTO) 49-51 mg tablet 1 Tab  1 Tab Oral BID    sodium chloride (NS) flush 5-40 mL  5-40 mL IntraVENous Q8H    sodium chloride (NS) flush 5-40 mL  5-40 mL IntraVENous PRN    acetaminophen (TYLENOL) tablet 650 mg  650 mg Oral Q6H PRN    Or    acetaminophen (TYLENOL) suppository 650 mg  650 mg Rectal Q6H PRN    polyethylene glycol (MIRALAX) packet 17 g  17 g Oral DAILY PRN    promethazine (PHENERGAN) tablet 12.5 mg  12.5 mg Oral Q6H PRN    Or    ondansetron (ZOFRAN) injection 4 mg  4 mg IntraVENous Q6H PRN            Lab/Data Review:  Labs: Results:       Chemistry Recent Labs     04/17/21  0321 04/16/21  0533 04/15/21  1104   * 116* 113*    139 141   K 3.9 3.1* 4.2    106 108   CO2 27 24 27   BUN 29* 25* 24*   CREA 1.34* 1.13 1.20   BUCR 22* 22* 20   AGAP 6 9 6   CA 8.4* 8.6 8.8     Recent Labs     04/15/21  1104   ALT 56   TP 6.9   ALB 3.3*   GLOB 3.6   AGRAT 0.9      CBC w/Diff Recent Labs     04/17/21  0321 04/16/21  0533 04/15/21  1104   WBC 6.9 7.4 7.0   RBC 4.82 4.26 4.16*   HGB 13.5 12.4 12.3   HCT 43.9 38.1 37.2   MCV 91.1 89.4 89.4   MCH 28.0 29.1 29.6   MCHC 30.8* 32.5 33.1   RDW 15.5* 15.6* 15.7*    243 227   GRANS  --   --  51   LYMPH  --   --  42   EOS  --   --  3      Coagulation No results for input(s): PTP, INR, APTT, INREXT, INREXT in the last 72 hours.     Iron/Ferritin Lab Results   Component Value Date/Time    Iron 60 06/03/2014 12:00 AM    TIBC 316 06/03/2014 12:00 AM    Iron % saturation 19 06/03/2014 12:00 AM       BNP    Cardiac Enzymes Lab Results   Component Value Date/Time     04/16/2021 05:33 AM    CK - MB 1.4 04/16/2021 05:33 AM    CK-MB Index 1.0 04/16/2021 05:33 AM Troponin-I, QT 0.03 04/16/2021 05:33 AM        Lactic Acid    Thyroid Studies          All Micro Results     None            Images:    CT (Most Recent). XRAY (Most Recent) XR Results (most recent):  Results from Hospital Encounter encounter on 04/15/21   XR CHEST PA LAT    Narrative EXAM: XR CHEST PA LAT    INDICATION: cough    COMPARISON: April 15, 2021. FINDINGS: PA and lateral radiographs of the chest demonstrate persistent patchy  bilateral airspace opacities and likely small bilateral layering pleural  opacities, left greater than right. . Stable enlarged cardiac silhouette and  pulmonary venous congestion. . No acute bone findings. Stable AICD. Mat Bello Impression No interval change. EKG No results found for this or any previous visit.      2D ECHO

## 2021-04-17 NOTE — ROUTINE PROCESS
Bedside shift change report given to JAVAD Mahmood (oncoming nurse) by Christy Castillo RN (offgoing nurse). Report included the following information SBAR, Procedure Summary, Intake/Output, MAR and Recent Results.

## 2021-04-18 LAB
ALBUMIN SERPL-MCNC: 3.5 G/DL (ref 3.4–5)
ALBUMIN/GLOB SERPL: 1 {RATIO} (ref 0.8–1.7)
ALP SERPL-CCNC: 91 U/L (ref 45–117)
ALT SERPL-CCNC: 55 U/L (ref 13–56)
ANION GAP SERPL CALC-SCNC: 10 MMOL/L (ref 3–18)
AST SERPL-CCNC: 33 U/L (ref 10–38)
BASOPHILS # BLD: 0 K/UL (ref 0–0.1)
BASOPHILS NFR BLD: 0 % (ref 0–2)
BILIRUB SERPL-MCNC: 0.7 MG/DL (ref 0.2–1)
BUN SERPL-MCNC: 30 MG/DL (ref 7–18)
BUN/CREAT SERPL: 23 (ref 12–20)
CALCIUM SERPL-MCNC: 9.1 MG/DL (ref 8.5–10.1)
CHLORIDE SERPL-SCNC: 104 MMOL/L (ref 100–111)
CO2 SERPL-SCNC: 26 MMOL/L (ref 21–32)
CREAT SERPL-MCNC: 1.32 MG/DL (ref 0.6–1.3)
DIFFERENTIAL METHOD BLD: ABNORMAL
EOSINOPHIL # BLD: 0.1 K/UL (ref 0–0.4)
EOSINOPHIL NFR BLD: 1 % (ref 0–5)
ERYTHROCYTE [DISTWIDTH] IN BLOOD BY AUTOMATED COUNT: 15.3 % (ref 11.6–14.5)
GLOBULIN SER CALC-MCNC: 3.4 G/DL (ref 2–4)
GLUCOSE SERPL-MCNC: 127 MG/DL (ref 74–99)
HCT VFR BLD AUTO: 40 % (ref 35–45)
HGB BLD-MCNC: 12.9 G/DL (ref 12–16)
LYMPHOCYTES # BLD: 2.1 K/UL (ref 0.9–3.6)
LYMPHOCYTES NFR BLD: 29 % (ref 21–52)
MCH RBC QN AUTO: 28.6 PG (ref 24–34)
MCHC RBC AUTO-ENTMCNC: 32.3 G/DL (ref 31–37)
MCV RBC AUTO: 88.7 FL (ref 74–97)
MONOCYTES # BLD: 0.5 K/UL (ref 0.05–1.2)
MONOCYTES NFR BLD: 8 % (ref 3–10)
NEUTS SEG # BLD: 4.5 K/UL (ref 1.8–8)
NEUTS SEG NFR BLD: 62 % (ref 40–73)
PLATELET # BLD AUTO: 255 K/UL (ref 135–420)
PMV BLD AUTO: 12.6 FL (ref 9.2–11.8)
POTASSIUM SERPL-SCNC: 3.7 MMOL/L (ref 3.5–5.5)
PROT SERPL-MCNC: 6.9 G/DL (ref 6.4–8.2)
RBC # BLD AUTO: 4.51 M/UL (ref 4.2–5.3)
SODIUM SERPL-SCNC: 140 MMOL/L (ref 136–145)
WBC # BLD AUTO: 7.2 K/UL (ref 4.6–13.2)

## 2021-04-18 PROCEDURE — 80053 COMPREHEN METABOLIC PANEL: CPT

## 2021-04-18 PROCEDURE — 74011250637 HC RX REV CODE- 250/637: Performed by: HOSPITALIST

## 2021-04-18 PROCEDURE — 94640 AIRWAY INHALATION TREATMENT: CPT

## 2021-04-18 PROCEDURE — 36415 COLL VENOUS BLD VENIPUNCTURE: CPT

## 2021-04-18 PROCEDURE — 85025 COMPLETE CBC W/AUTO DIFF WBC: CPT

## 2021-04-18 PROCEDURE — 99232 SBSQ HOSP IP/OBS MODERATE 35: CPT | Performed by: INTERNAL MEDICINE

## 2021-04-18 PROCEDURE — 51798 US URINE CAPACITY MEASURE: CPT

## 2021-04-18 PROCEDURE — 65660000000 HC RM CCU STEPDOWN

## 2021-04-18 PROCEDURE — 74011250637 HC RX REV CODE- 250/637: Performed by: INTERNAL MEDICINE

## 2021-04-18 PROCEDURE — 74011000250 HC RX REV CODE- 250: Performed by: INTERNAL MEDICINE

## 2021-04-18 PROCEDURE — 2709999900 HC NON-CHARGEABLE SUPPLY

## 2021-04-18 RX ORDER — CALCIUM CARB/MAGNESIUM CARB 311-232MG
5 TABLET ORAL
Status: DISCONTINUED | OUTPATIENT
Start: 2021-04-18 | End: 2021-04-21 | Stop reason: HOSPADM

## 2021-04-18 RX ORDER — BUMETANIDE 1 MG/1
1 TABLET ORAL DAILY
Status: DISCONTINUED | OUTPATIENT
Start: 2021-04-19 | End: 2021-04-19

## 2021-04-18 RX ORDER — BUMETANIDE 0.25 MG/ML
1 INJECTION INTRAMUSCULAR; INTRAVENOUS ONCE
Status: COMPLETED | OUTPATIENT
Start: 2021-04-19 | End: 2021-04-19

## 2021-04-18 RX ORDER — DIPHENHYDRAMINE HCL 25 MG
25 CAPSULE ORAL
Status: DISCONTINUED | OUTPATIENT
Start: 2021-04-18 | End: 2021-04-19

## 2021-04-18 RX ORDER — AZITHROMYCIN 250 MG/1
500 TABLET, FILM COATED ORAL DAILY
Status: DISCONTINUED | OUTPATIENT
Start: 2021-04-18 | End: 2021-04-21 | Stop reason: HOSPADM

## 2021-04-18 RX ORDER — FAMOTIDINE 20 MG/1
20 TABLET, FILM COATED ORAL DAILY
Status: DISCONTINUED | OUTPATIENT
Start: 2021-04-18 | End: 2021-04-19

## 2021-04-18 RX ORDER — LORAZEPAM 2 MG/ML
1 INJECTION INTRAMUSCULAR
Status: DISCONTINUED | OUTPATIENT
Start: 2021-04-18 | End: 2021-04-21 | Stop reason: HOSPADM

## 2021-04-18 RX ORDER — AMLODIPINE BESYLATE 5 MG/1
5 TABLET ORAL
Status: DISCONTINUED | OUTPATIENT
Start: 2021-04-18 | End: 2021-04-19

## 2021-04-18 RX ORDER — IPRATROPIUM BROMIDE AND ALBUTEROL SULFATE 2.5; .5 MG/3ML; MG/3ML
3 SOLUTION RESPIRATORY (INHALATION)
Status: DISCONTINUED | OUTPATIENT
Start: 2021-04-18 | End: 2021-04-19

## 2021-04-18 RX ORDER — LORATADINE 10 MG/1
10 TABLET ORAL DAILY
Status: DISCONTINUED | OUTPATIENT
Start: 2021-04-18 | End: 2021-04-21 | Stop reason: HOSPADM

## 2021-04-18 RX ORDER — AMOXICILLIN 250 MG
2 CAPSULE ORAL
Status: DISCONTINUED | OUTPATIENT
Start: 2021-04-18 | End: 2021-04-21 | Stop reason: HOSPADM

## 2021-04-18 RX ADMIN — DIPHENHYDRAMINE HYDROCHLORIDE 25 MG: 25 CAPSULE ORAL at 02:18

## 2021-04-18 RX ADMIN — Medication 10 ML: at 17:13

## 2021-04-18 RX ADMIN — AZITHROMYCIN MONOHYDRATE 500 MG: 250 TABLET ORAL at 13:26

## 2021-04-18 RX ADMIN — APIXABAN 5 MG: 5 TABLET, FILM COATED ORAL at 17:21

## 2021-04-18 RX ADMIN — MONTELUKAST 10 MG: 10 TABLET, FILM COATED ORAL at 09:48

## 2021-04-18 RX ADMIN — LORATADINE 10 MG: 10 TABLET ORAL at 13:26

## 2021-04-18 RX ADMIN — Medication 5 MG: at 22:14

## 2021-04-18 RX ADMIN — Medication 81 MG: at 09:49

## 2021-04-18 RX ADMIN — METOPROLOL TARTRATE 50 MG: 50 TABLET, FILM COATED ORAL at 10:01

## 2021-04-18 RX ADMIN — APIXABAN 5 MG: 5 TABLET, FILM COATED ORAL at 09:49

## 2021-04-18 RX ADMIN — Medication 10 ML: at 05:25

## 2021-04-18 RX ADMIN — PROMETHAZINE HYDROCHLORIDE 12.5 MG: 12.5 TABLET ORAL at 02:22

## 2021-04-18 RX ADMIN — ATORVASTATIN CALCIUM 20 MG: 20 TABLET, FILM COATED ORAL at 09:49

## 2021-04-18 RX ADMIN — FLUTICASONE PROPIONATE 2 SPRAY: 50 SPRAY, METERED NASAL at 10:05

## 2021-04-18 RX ADMIN — METOPROLOL TARTRATE 50 MG: 50 TABLET, FILM COATED ORAL at 17:21

## 2021-04-18 RX ADMIN — AMLODIPINE BESYLATE 5 MG: 5 TABLET ORAL at 22:05

## 2021-04-18 RX ADMIN — MAGNESIUM GLUCONATE 500 MG ORAL TABLET 400 MG: 500 TABLET ORAL at 09:49

## 2021-04-18 RX ADMIN — FAMOTIDINE 20 MG: 20 TABLET, FILM COATED ORAL at 13:26

## 2021-04-18 RX ADMIN — IPRATROPIUM BROMIDE AND ALBUTEROL SULFATE 3 ML: .5; 3 SOLUTION RESPIRATORY (INHALATION) at 16:49

## 2021-04-18 NOTE — PROGRESS NOTES
Bedside and Verbal shift change report given to Alex Hernandez RN (oncoming nurse) by Stuart Moreno RN (offgoing nurse). Report included the following information SBAR, Kardex, Intake/Output, MAR and Recent Results.

## 2021-04-18 NOTE — PROGRESS NOTES
Hospitalist Progress Note    Patient: Antonia Butler Age: 67 y.o. : 1945 MR#: [id number] SSN: [id number]  Date/Time: 2021 10:51 AM    DOA: 4/15/2021  PCP: Luis F, MD Bobby    Subjective:     She stated that she has been sneezing all night long and unable to sleep. No fever. She has on-going cough. She refused lab work today, Diuretic was held yesterday since she worsened renal function  flonase given last night.   singulair given yesterday          Interval Hospital Course:  67 y.o female with HTN, nonischemic cardiomyopathy, ICD, persistent afib on Eliquis, h/o CVA with left-sided hemiparesis, presented from home with feeling of fatigue and shortness of breath. She was recently with COVID-19 vaccination. No fever. In the ER, she was found to have vascular congestion with elevated proBNP and troponin. She was started on diuresis. [facility] record indicated that she has underlying non-ischemic cardiomyopathy with ICD placed in 2018. She has been on Eliquis for persistent afib. Negative cardiac enzyme     She tolerated diuretic well and improved in her breathing but still feels tire with exertion. She has slight increase in her creatinine, hence her diuretic and Entresto were held. ROS:  No current fever/chills, no headache, no dizziness, no facial pain, ++sinus congestion,   No swallowing pain, No chest pain, no palpitation, + shortness of breath, no abd pain,  No diarrhea, no urinary complaint, no leg pain or swelling    Assessment/Plan:     1. Acute on chronic systolic CHF  2. Non-ischemic cardiomyopathy with present of ICD   3. Persistent AFIB, on Eliquis   4. Indeterminate troponin elevation, likely demand ischemia due to CHF   5. Hypertension   6. Hyperlipidemia  7. Morbid obesity   8. Hypokalemia    9. Hard of hearing   10. Acute renal insufficiency from over diuresis   11. Allergic rhinitis? Acute bronchitis?     Cont with Flonase, add claritin, add azithromycin for likely bronchitis   Spoke with her and convinced her to allow for lab workup to monitor her kidney function, she agreed. HOLD off her IV bumex, can resume at oral form, check her BMP closely. Avoid nephrotoxic medications for now. Can hold her antihypertensive if BP is low. Echo follow up though she has baseline EF of 20-29%  She needs PT/OT   Continue with her current home medications. Can follow up with her [facility] cardiology   Replace electrolytes   pepcid      Full code  Called her son at [phone], x2 without answering. Additional Notes:   Time spent >30 minutes     Case discussed with:  [x]Patient  []Family  [x]Nursing  [x]Case Management  DVT Prophylaxis:  []Lovenox  [x]Eliquis  []SCDs  []Coumadin   []On Heparin gtt        Signed By: Yelena Domínguez MD     2021 10:51 AM              Objective:   VS:   Visit Vitals  BP (!) 151/88   Pulse 92   Temp 97.5 °F (36.4 °C)   Resp 20   Ht 5' 6\" (1.676 m)   Wt 90 kg (198 lb 6.4 oz)   SpO2 98%   Breastfeeding Unknown   BMI 32.02 kg/m²      Tmax/24hrs: Temp (24hrs), Av.6 °F (36.4 °C), Min:97.2 °F (36.2 °C), Max:98.1 °F (36.7 °C)    No intake or output data in the 24 hours ending 21 1051    Tele: afib  General:  Cooperative, Not in acute distress, speaks in full sentence while in bed  HEENT: PERRL, EOMI, supple neck, no JVD, dry oral mucosa  Cardiovascular: S1S2 irregular, no rub/gallop   Pulmonary: Clear air entry bilaterally, no wheezing, + crackle  GI:  Soft, non tender, non distended, +bs, no guarding   Extremities:  No pedal edema, +distal pulses appreciated   Neuro: AOx3, moving all extremities, no gross deficit.      Additional:       Current Facility-Administered Medications   Medication Dose Route Frequency    diphenhydrAMINE (BENADRYL) capsule 25 mg  25 mg Oral Q6H PRN    montelukast (SINGULAIR) tablet 10 mg  10 mg Oral DAILY    fluticasone propionate (FLONASE) 50 mcg/actuation nasal spray 2 Spray  2 Spray Both Nostrils DAILY    magnesium oxide (MAG-OX) tablet 400 mg  400 mg Oral DAILY    metoprolol tartrate (LOPRESSOR) tablet 50 mg  50 mg Oral BID    [Held by provider] amLODIPine (NORVASC) tablet 10 mg  10 mg Oral DAILY    aspirin delayed-release tablet 81 mg  81 mg Oral DAILY    atorvastatin (LIPITOR) tablet 20 mg  20 mg Oral DAILY    apixaban (ELIQUIS) tablet 5 mg  5 mg Oral BID    [Held by provider] sacubitriL-valsartan (ENTRESTO) 49-51 mg tablet 1 Tab  1 Tab Oral BID    sodium chloride (NS) flush 5-40 mL  5-40 mL IntraVENous Q8H    sodium chloride (NS) flush 5-40 mL  5-40 mL IntraVENous PRN    acetaminophen (TYLENOL) tablet 650 mg  650 mg Oral Q6H PRN    Or    acetaminophen (TYLENOL) suppository 650 mg  650 mg Rectal Q6H PRN    polyethylene glycol (MIRALAX) packet 17 g  17 g Oral DAILY PRN    promethazine (PHENERGAN) tablet 12.5 mg  12.5 mg Oral Q6H PRN    Or    ondansetron (ZOFRAN) injection 4 mg  4 mg IntraVENous Q6H PRN            Lab/Data Review:  Labs: Results:       Chemistry Recent Labs     04/17/21  0321 04/16/21  0533 04/15/21  1104   * 116* 113*    139 141   K 3.9 3.1* 4.2    106 108   CO2 27 24 27   BUN 29* 25* 24*   CREA 1.34* 1.13 1.20   BUCR 22* 22* 20   AGAP 6 9 6   CA 8.4* 8.6 8.8     Recent Labs     04/15/21  1104   ALT 56   TP 6.9   ALB 3.3*   GLOB 3.6   AGRAT 0.9      CBC w/Diff Recent Labs     04/17/21  0321 04/16/21  0533 04/15/21  1104   WBC 6.9 7.4 7.0   RBC 4.82 4.26 4.16*   HGB 13.5 12.4 12.3   HCT 43.9 38.1 37.2   MCV 91.1 89.4 89.4   MCH 28.0 29.1 29.6   MCHC 30.8* 32.5 33.1   RDW 15.5* 15.6* 15.7*    243 227   GRANS  --   --  51   LYMPH  --   --  42   EOS  --   --  3      Coagulation No results for input(s): PTP, INR, APTT, INREXT, INREXT in the last 72 hours.     Iron/Ferritin Lab Results   Component Value Date/Time    Iron 60 06/03/2014 12:00 AM    TIBC 316 06/03/2014 12:00 AM    Iron % saturation 19 06/03/2014 12:00 AM       BNP    Cardiac Enzymes Lab Results Component Value Date/Time     04/16/2021 05:33 AM    CK - MB 1.4 04/16/2021 05:33 AM    CK-MB Index 1.0 04/16/2021 05:33 AM    Troponin-I, QT 0.03 04/16/2021 05:33 AM        Lactic Acid    Thyroid Studies          All Micro Results     None            Images:    CT (Most Recent). XRAY (Most Recent) XR Results (most recent):  Results from Hospital Encounter encounter on 04/15/21   XR CHEST PA LAT    Narrative EXAM: XR CHEST PA LAT    INDICATION: cough    COMPARISON: April 15, 2021. FINDINGS: PA and lateral radiographs of the chest demonstrate persistent patchy  bilateral airspace opacities and likely small bilateral layering pleural  opacities, left greater than right. . Stable enlarged cardiac silhouette and  pulmonary venous congestion. . No acute bone findings. Stable AICD. Aurora Crofts Impression No interval change. EKG No results found for this or any previous visit.      2D ECHO

## 2021-04-18 NOTE — PROGRESS NOTES
Problem: Pressure Injury - Risk of  Goal: *Prevention of pressure injury  Description: Document Yash Scale and appropriate interventions in the flowsheet.   Outcome: Progressing Towards Goal  Note: Pressure Injury Interventions:  Sensory Interventions: Assess changes in LOC, Discuss PT/OT consult with provider, Keep linens dry and wrinkle-free, Minimize linen layers, Pressure redistribution bed/mattress (bed type)    Moisture Interventions: Absorbent underpads    Activity Interventions: Increase time out of bed, Pressure redistribution bed/mattress(bed type), PT/OT evaluation    Mobility Interventions: HOB 30 degrees or less, Pressure redistribution bed/mattress (bed type), PT/OT evaluation    Nutrition Interventions: Document food/fluid/supplement intake                     Problem: Patient Education: Go to Patient Education Activity  Goal: Patient/Family Education  Outcome: Progressing Towards Goal     Problem: Heart Failure: Day 1  Goal: Off Pathway (Use only if patient is Off Pathway)  Outcome: Progressing Towards Goal  Goal: Activity/Safety  Outcome: Progressing Towards Goal  Goal: Consults, if ordered  Outcome: Progressing Towards Goal  Goal: Diagnostic Test/Procedures  Outcome: Progressing Towards Goal  Goal: Nutrition/Diet  Outcome: Progressing Towards Goal  Goal: Discharge Planning  Outcome: Progressing Towards Goal  Goal: Medications  Outcome: Progressing Towards Goal  Goal: Respiratory  Outcome: Progressing Towards Goal  Goal: Treatments/Interventions/Procedures  Outcome: Progressing Towards Goal  Goal: Psychosocial  Outcome: Progressing Towards Goal  Goal: *Oxygen saturation within defined limits  Outcome: Progressing Towards Goal  Goal: *Hemodynamically stable  Outcome: Progressing Towards Goal  Goal: *Optimal pain control at patient's stated goal  Outcome: Progressing Towards Goal  Goal: *Anxiety reduced or absent  Outcome: Progressing Towards Goal     Problem: Heart Failure: Day 2  Goal: Off Pathway (Use only if patient is Off Pathway)  Outcome: Progressing Towards Goal  Goal: Activity/Safety  Outcome: Progressing Towards Goal  Goal: Consults, if ordered  Outcome: Progressing Towards Goal  Goal: Diagnostic Test/Procedures  Outcome: Progressing Towards Goal  Goal: Nutrition/Diet  Outcome: Progressing Towards Goal  Goal: Discharge Planning  Outcome: Progressing Towards Goal  Goal: Medications  Outcome: Progressing Towards Goal  Goal: Respiratory  Outcome: Progressing Towards Goal  Goal: Treatments/Interventions/Procedures  Outcome: Progressing Towards Goal  Goal: Psychosocial  Outcome: Progressing Towards Goal  Goal: *Oxygen saturation within defined limits  Outcome: Progressing Towards Goal  Goal: *Hemodynamically stable  Outcome: Progressing Towards Goal  Goal: *Optimal pain control at patient's stated goal  Outcome: Progressing Towards Goal  Goal: *Anxiety reduced or absent  Outcome: Progressing Towards Goal  Goal: *Demonstrates progressive activity  Outcome: Progressing Towards Goal     Problem: Heart Failure: Day 3  Goal: Off Pathway (Use only if patient is Off Pathway)  Outcome: Progressing Towards Goal  Goal: Activity/Safety  Outcome: Progressing Towards Goal  Goal: Diagnostic Test/Procedures  Outcome: Progressing Towards Goal  Goal: Nutrition/Diet  Outcome: Progressing Towards Goal  Goal: Discharge Planning  Outcome: Progressing Towards Goal  Goal: Medications  Outcome: Progressing Towards Goal  Goal: Respiratory  Outcome: Progressing Towards Goal  Goal: Treatments/Interventions/Procedures  Outcome: Progressing Towards Goal  Goal: Psychosocial  Outcome: Progressing Towards Goal  Goal: *Oxygen saturation within defined limits  Outcome: Progressing Towards Goal  Goal: *Hemodynamically stable  Outcome: Progressing Towards Goal  Goal: *Optimal pain control at patient's stated goal  Outcome: Progressing Towards Goal  Goal: *Anxiety reduced or absent  Outcome: Progressing Towards Goal  Goal: *Demonstrates progressive activity  Outcome: Progressing Towards Goal     Problem: Heart Failure: Day 4  Goal: Off Pathway (Use only if patient is Off Pathway)  Outcome: Progressing Towards Goal  Goal: Activity/Safety  Outcome: Progressing Towards Goal  Goal: Diagnostic Test/Procedures  Outcome: Progressing Towards Goal  Goal: Nutrition/Diet  Outcome: Progressing Towards Goal  Goal: Discharge Planning  Outcome: Progressing Towards Goal  Goal: Medications  Outcome: Progressing Towards Goal  Goal: Respiratory  Outcome: Progressing Towards Goal  Goal: Treatments/Interventions/Procedures  Outcome: Progressing Towards Goal  Goal: Psychosocial  Outcome: Progressing Towards Goal  Goal: *Oxygen saturation within defined limits  Outcome: Progressing Towards Goal  Goal: *Hemodynamically stable  Outcome: Progressing Towards Goal  Goal: *Optimal pain control at patient's stated goal  Outcome: Progressing Towards Goal  Goal: *Anxiety reduced or absent  Outcome: Progressing Towards Goal  Goal: *Demonstrates progressive activity  Outcome: Progressing Towards Goal     Problem: Heart Failure: Day 5  Goal: Off Pathway (Use only if patient is Off Pathway)  Outcome: Progressing Towards Goal  Goal: Activity/Safety  Outcome: Progressing Towards Goal  Goal: Diagnostic Test/Procedures  Outcome: Progressing Towards Goal  Goal: Nutrition/Diet  Outcome: Progressing Towards Goal  Goal: Discharge Planning  Outcome: Progressing Towards Goal  Goal: Medications  Outcome: Progressing Towards Goal  Goal: Respiratory  Outcome: Progressing Towards Goal  Goal: Treatments/Interventions/Procedures  Outcome: Progressing Towards Goal  Goal: Psychosocial  Outcome: Progressing Towards Goal     Problem: Heart Failure: Discharge Outcomes  Goal: *Demonstrates ability to perform prescribed activity without shortness of breath or discomfort  Outcome: Progressing Towards Goal  Goal: *Left ventricular function assessment completed prior to or during stay, or planned for post-discharge  Outcome: Progressing Towards Goal  Goal: *ACEI prescribed if LVEF less than 40% and no contraindications or ARB prescribed  Outcome: Progressing Towards Goal  Goal: *Verbalizes understanding and describes prescribed diet  Outcome: Progressing Towards Goal  Goal: *Verbalizes understanding/describes prescribed medications  Outcome: Progressing Towards Goal  Goal: *Describes available resources and support systems  Description: (eg: Home Health, Palliative Care, Advanced Medical Directive)  Outcome: Progressing Towards Goal  Goal: *Describes smoking cessation resources  Outcome: Progressing Towards Goal  Goal: *Understands and describes signs and symptoms to report to providers(Stroke Metric)  Outcome: Progressing Towards Goal  Goal: *Describes/verbalizes understanding of follow-up/return appt  Description: (eg: to physicians, diabetes treatment coordinator, and other resources  Outcome: Progressing Towards Goal  Goal: *Describes importance of continuing daily weights and changes to report to physician  Outcome: Progressing Towards Goal

## 2021-04-19 ENCOUNTER — HOSPITAL ENCOUNTER (INPATIENT)
Dept: ULTRASOUND IMAGING | Age: 76
Discharge: HOME | DRG: 291 | End: 2021-04-19
Attending: INTERNAL MEDICINE
Payer: MEDICARE

## 2021-04-19 ENCOUNTER — APPOINTMENT (OUTPATIENT)
Dept: CT IMAGING | Age: 76
DRG: 291 | End: 2021-04-19
Attending: INTERNAL MEDICINE
Payer: MEDICARE

## 2021-04-19 LAB
ANION GAP SERPL CALC-SCNC: 8 MMOL/L (ref 3–18)
BNP SERPL-MCNC: 8417 PG/ML (ref 0–1800)
BUN SERPL-MCNC: 33 MG/DL (ref 7–18)
BUN/CREAT SERPL: 24 (ref 12–20)
CALCIUM SERPL-MCNC: 8.6 MG/DL (ref 8.5–10.1)
CHLORIDE SERPL-SCNC: 105 MMOL/L (ref 100–111)
CO2 SERPL-SCNC: 24 MMOL/L (ref 21–32)
CREAT SERPL-MCNC: 1.38 MG/DL (ref 0.6–1.3)
GLUCOSE SERPL-MCNC: 145 MG/DL (ref 74–99)
POTASSIUM SERPL-SCNC: 4 MMOL/L (ref 3.5–5.5)
SODIUM SERPL-SCNC: 137 MMOL/L (ref 136–145)

## 2021-04-19 PROCEDURE — 74011250637 HC RX REV CODE- 250/637: Performed by: INTERNAL MEDICINE

## 2021-04-19 PROCEDURE — 74011250637 HC RX REV CODE- 250/637: Performed by: HOSPITALIST

## 2021-04-19 PROCEDURE — 76770 US EXAM ABDO BACK WALL COMP: CPT

## 2021-04-19 PROCEDURE — 2709999900 HC NON-CHARGEABLE SUPPLY

## 2021-04-19 PROCEDURE — 99223 1ST HOSP IP/OBS HIGH 75: CPT | Performed by: INTERNAL MEDICINE

## 2021-04-19 PROCEDURE — 74011250637 HC RX REV CODE- 250/637: Performed by: NURSE PRACTITIONER

## 2021-04-19 PROCEDURE — 65660000000 HC RM CCU STEPDOWN

## 2021-04-19 PROCEDURE — 99232 SBSQ HOSP IP/OBS MODERATE 35: CPT | Performed by: INTERNAL MEDICINE

## 2021-04-19 PROCEDURE — 83880 ASSAY OF NATRIURETIC PEPTIDE: CPT

## 2021-04-19 PROCEDURE — 82570 ASSAY OF URINE CREATININE: CPT

## 2021-04-19 PROCEDURE — 81001 URINALYSIS AUTO W/SCOPE: CPT

## 2021-04-19 PROCEDURE — 36415 COLL VENOUS BLD VENIPUNCTURE: CPT

## 2021-04-19 PROCEDURE — 84300 ASSAY OF URINE SODIUM: CPT

## 2021-04-19 PROCEDURE — 70450 CT HEAD/BRAIN W/O DYE: CPT

## 2021-04-19 PROCEDURE — 74011000250 HC RX REV CODE- 250: Performed by: HOSPITALIST

## 2021-04-19 PROCEDURE — 74011250636 HC RX REV CODE- 250/636: Performed by: INTERNAL MEDICINE

## 2021-04-19 PROCEDURE — 80048 BASIC METABOLIC PNL TOTAL CA: CPT

## 2021-04-19 PROCEDURE — 74011000250 HC RX REV CODE- 250: Performed by: INTERNAL MEDICINE

## 2021-04-19 RX ORDER — FUROSEMIDE 10 MG/ML
40 INJECTION INTRAMUSCULAR; INTRAVENOUS ONCE
Status: COMPLETED | OUTPATIENT
Start: 2021-04-19 | End: 2021-04-19

## 2021-04-19 RX ORDER — HYDRALAZINE HYDROCHLORIDE 100 MG/1
100 TABLET, FILM COATED ORAL 3 TIMES DAILY
Status: ON HOLD | COMMUNITY
End: 2021-04-21 | Stop reason: SDUPTHER

## 2021-04-19 RX ORDER — SPIRONOLACTONE 25 MG/1
25 TABLET ORAL DAILY
COMMUNITY
End: 2021-04-21

## 2021-04-19 RX ORDER — ACETAMINOPHEN 500 MG
500 TABLET ORAL
COMMUNITY

## 2021-04-19 RX ORDER — GLUCOSAMINE SULFATE 1500 MG
1000 POWDER IN PACKET (EA) ORAL DAILY
COMMUNITY

## 2021-04-19 RX ORDER — FUROSEMIDE 40 MG/1
40 TABLET ORAL
Status: DISCONTINUED | OUTPATIENT
Start: 2021-04-19 | End: 2021-04-19

## 2021-04-19 RX ORDER — IPRATROPIUM BROMIDE AND ALBUTEROL SULFATE 2.5; .5 MG/3ML; MG/3ML
3 SOLUTION RESPIRATORY (INHALATION)
Status: DISCONTINUED | OUTPATIENT
Start: 2021-04-19 | End: 2021-04-21 | Stop reason: HOSPADM

## 2021-04-19 RX ORDER — LANOLIN ALCOHOL/MO/W.PET/CERES
400 CREAM (GRAM) TOPICAL DAILY
COMMUNITY

## 2021-04-19 RX ORDER — CARVEDILOL 12.5 MG/1
12.5 TABLET ORAL EVERY 12 HOURS
Status: DISCONTINUED | OUTPATIENT
Start: 2021-04-19 | End: 2021-04-21 | Stop reason: HOSPADM

## 2021-04-19 RX ORDER — PANTOPRAZOLE SODIUM 40 MG/1
40 TABLET, DELAYED RELEASE ORAL
Status: DISCONTINUED | OUTPATIENT
Start: 2021-04-19 | End: 2021-04-21 | Stop reason: HOSPADM

## 2021-04-19 RX ORDER — ISOSORBIDE MONONITRATE 30 MG/1
30 TABLET, EXTENDED RELEASE ORAL
COMMUNITY

## 2021-04-19 RX ADMIN — ATORVASTATIN CALCIUM 20 MG: 20 TABLET, FILM COATED ORAL at 09:19

## 2021-04-19 RX ADMIN — LORAZEPAM 1 MG: 2 INJECTION INTRAMUSCULAR; INTRAVENOUS at 09:26

## 2021-04-19 RX ADMIN — FAMOTIDINE 20 MG: 20 TABLET, FILM COATED ORAL at 09:19

## 2021-04-19 RX ADMIN — CARVEDILOL 12.5 MG: 12.5 TABLET, FILM COATED ORAL at 20:01

## 2021-04-19 RX ADMIN — LORATADINE 10 MG: 10 TABLET ORAL at 09:20

## 2021-04-19 RX ADMIN — PANTOPRAZOLE SODIUM 40 MG: 40 TABLET, DELAYED RELEASE ORAL at 19:56

## 2021-04-19 RX ADMIN — FUROSEMIDE 40 MG: 10 INJECTION, SOLUTION INTRAMUSCULAR; INTRAVENOUS at 19:56

## 2021-04-19 RX ADMIN — Medication 5 MG: at 21:51

## 2021-04-19 RX ADMIN — DIPHENHYDRAMINE HYDROCHLORIDE 25 MG: 25 CAPSULE ORAL at 03:09

## 2021-04-19 RX ADMIN — APIXABAN 5 MG: 5 TABLET, FILM COATED ORAL at 09:18

## 2021-04-19 RX ADMIN — BUMETANIDE 1 MG: 1 TABLET ORAL at 09:18

## 2021-04-19 RX ADMIN — IPRATROPIUM BROMIDE AND ALBUTEROL SULFATE 3 ML: .5; 3 SOLUTION RESPIRATORY (INHALATION) at 04:22

## 2021-04-19 RX ADMIN — Medication 81 MG: at 09:18

## 2021-04-19 RX ADMIN — IPRATROPIUM BROMIDE AND ALBUTEROL SULFATE 3 ML: .5; 3 SOLUTION RESPIRATORY (INHALATION) at 09:27

## 2021-04-19 RX ADMIN — MAGNESIUM GLUCONATE 500 MG ORAL TABLET 400 MG: 500 TABLET ORAL at 09:18

## 2021-04-19 RX ADMIN — APIXABAN 5 MG: 5 TABLET, FILM COATED ORAL at 19:56

## 2021-04-19 RX ADMIN — AZITHROMYCIN MONOHYDRATE 500 MG: 250 TABLET ORAL at 09:18

## 2021-04-19 RX ADMIN — FLUTICASONE PROPIONATE 2 SPRAY: 50 SPRAY, METERED NASAL at 09:19

## 2021-04-19 RX ADMIN — BUMETANIDE 1 MG: 0.25 INJECTION INTRAMUSCULAR; INTRAVENOUS at 00:03

## 2021-04-19 RX ADMIN — Medication 10 ML: at 21:51

## 2021-04-19 RX ADMIN — Medication 10 ML: at 09:27

## 2021-04-19 RX ADMIN — Medication 10 ML: at 20:02

## 2021-04-19 RX ADMIN — METOPROLOL TARTRATE 50 MG: 50 TABLET, FILM COATED ORAL at 09:18

## 2021-04-19 NOTE — CONSULTS
Consult Note    Consult requested by: Gopi Clark MD    ADMIT DATE: 4/15/2021    CONSULT DATE: April 19, 2021           Admission diagnosis: Acute on chronic systolic (congestive) heart failure ([facility] [address]   Reason for Nephrology Consultation: ORIN    Assessment and plan    #1 ORIN on CKD 3, baseline creatinine appears to be <1 but does fluctuate as in the setting of cardiorenal syndrome. Etiology of ORIN cardiorenal syndrome in the setting of biventricular dysfunction. Severe nonischemic cardiomyopathy. Does appear to be fluid overloaded and needs continue diuresis. Need to monitor renal functions and electrolytes closely  #2 acute on chronic systolic congestive heart failure  #3 nonischemic cardiomyopathy with EF of 15 to 20%  #4 chronic atrial fibrillation  #5 hypertension, controlled  #6 morbid obesity  #7 Abdominal pain     Plan:  #1 Lasix 40 mg IV X 1 , fluid restrict 1000 ml ,  Strict I/o   Further diuresis based on response, check BNP   #2 Continue to hold Entresto as patient is soft blood pressures and in the setting of possible ORIN  #3 hold amlodipine to allow BP to be ~ 130-140s SBP  #4 low-salt diet  #5 follow renal ultrasound, renal duplex  #6 avoid NSAIDs and other nephrotoxins or IV contrast  #7 urine studies ordered, follow    Please call with questions    Ellie Smith MD FASN  Cell [phone]  Pager: [phone]    HPI:   Patient is a 58-year-old female with history of acute on chronic systolic congestive heart failure, hypertension, history of nonischemic cardiomyopathy with EF of 15%, severe morbid obesity, hypertension, AICD in place who presented with worsening shortness of breath. Patient was having worsening cough shortness of breath and edema. On presentation patient was afebrile tachycardic, normotensive, saturating 90s on room air. Patient was noted to have pulmonary vascular congestion on chest x-ray.   Echocardiogram done shows 15 to 20% EF, severe reduced systolic function, severely dilated left atrium, mildly dilated right atrium, pulmonary artery pressure of 36. Patient was diuresed with Bumex and it was noted that patient's creatinine elevated at 1.3.   Nephrology consulted due to concerns for possible ORIN and renal insufficiency       Past Medical History:   Diagnosis Date    Atrial fibrillation ([facility] [address]     10/11 per Dr Elvin Hernandez note; converted to SR and of [place] for 3 months already and staying in [place]    Cerebrovascular accident [facility]) 10/2011    left mu; improved almost completely    Essential hypertension     Essential hypertension, benign     Hard of hearing 3/20/2015    Other and unspecified hyperlipidemia     LDLs are at goal, triglycerides are at goal, HDLs are at goal      Past Surgical History:   Procedure Laterality Date    HX HYSTERECTOMY      total       Social History     Socioeconomic History    Marital status: SINGLE     Spouse name: Not on file    Number of children: Not on file    Years of education: Not on file    Highest education level: Not on file   Occupational History    Not on file   Social Needs    Financial resource strain: Not on file    Food insecurity     Worry: Not on file     Inability: Not on file    Transportation needs     Medical: Not on file     Non-medical: Not on file   Tobacco Use    Smoking status: Never Smoker    Smokeless tobacco: Never Used   Substance and Sexual Activity    Alcohol use: No     Comment: occasional, quit     Drug use: No    Sexual activity: Not on file   Lifestyle    Physical activity     Days per week: Not on file     Minutes per session: Not on file    Stress: Not on file   Relationships    Social connections     Talks on phone: Not on file     Gets together: Not on file     Attends Rastafarian service: Not on file     Active member of club or organization: Not on file     Attends meetings of clubs or organizations: Not on file     Relationship status: Not on file    Intimate partner violence     Fear of current or ex partner: Not on file     Emotionally abused: Not on file     Physically abused: Not on file     Forced sexual activity: Not on file   Other Topics Concern    Not on file   Social History Narrative    Not on file       Family History   Problem Relation Age of Onset    High Cholesterol Sister     Hypertension Other         family history of essential hypertension    Heart Attack Neg Hx     Sudden Death Neg Hx      Allergies   Allergen Reactions    Tetanus Vaccines And Toxoid Swelling    Penicillins Swelling        Home Medications:     Medications Prior to Admission   Medication Sig    spironolactone (ALDACTONE) 25 mg tablet Take 25 mg by mouth daily. Indications: accumulation of fluid resulting from chronic heart failure    hydrALAZINE (APRESOLINE) 100 mg tablet Take 100 mg by mouth three (3) times daily. Indications: high blood pressure    isosorbide mononitrate ER (IMDUR) 30 mg tablet Take 30 mg by mouth every morning.  cholecalciferol (Vitamin D3) 25 mcg (1,000 unit) cap Take 1,000 Units by mouth daily.  acetaminophen (Tylenol Extra Strength) 500 mg tablet Take 500 mg by mouth every six (6) hours as needed for Pain.  magnesium oxide (MAG-OX) 400 mg tablet Take 400 mg by mouth daily.  amLODIPine (NORVASC) 10 mg tablet Take 10 mg by mouth daily.  Eliquis 5 mg tablet Take 5 mg by mouth two (2) times a day.  furosemide (LASIX) 40 mg tablet Take 80 mg by mouth three (3) times daily.  Entresto 49-51 mg tab tablet Take 1 Tab by mouth two (2) times a day.  metoprolol tartrate (LOPRESSOR) 100 mg IR tablet Take 100 mg by mouth two (2) times a day.  aspirin delayed-release (ASPIRIN LOW DOSE) 81 mg tablet Take 1 Tab by mouth daily.  atorvastatin (LIPITOR) 20 mg tablet Take 20 mg by mouth daily.        Current Inpatient Medications:     Current Facility-Administered Medications   Medication Dose Route Frequency    pantoprazole (PROTONIX) tablet 40 mg  40 mg Oral ACB&D  carvediloL (COREG) tablet 12.5 mg  12.5 mg Oral Q12H    furosemide (LASIX) tablet 40 mg  40 mg Oral ACB&D    loratadine (CLARITIN) tablet 10 mg  10 mg Oral DAILY    senna-docusate (PERICOLACE) 8.6-50 mg per tablet 2 Tab  2 Tab Oral QHS    melatonin (rapid dissolve) tablet 5 mg  5 mg Oral QHS    azithromycin (ZITHROMAX) tablet 500 mg  500 mg Oral DAILY    albuterol-ipratropium (DUO-NEB) 2.5 MG-0.5 MG/3 ML  3 mL Nebulization Q6HWA RT    LORazepam (ATIVAN) injection 1 mg  1 mg IntraVENous Q6H PRN    amLODIPine (NORVASC) tablet 5 mg  5 mg Oral QHS    fluticasone propionate (FLONASE) 50 mcg/actuation nasal spray 2 Spray  2 Spray Both Nostrils DAILY    magnesium oxide (MAG-OX) tablet 400 mg  400 mg Oral DAILY    aspirin delayed-release tablet 81 mg  81 mg Oral DAILY    atorvastatin (LIPITOR) tablet 20 mg  20 mg Oral DAILY    apixaban (ELIQUIS) tablet 5 mg  5 mg Oral BID    [Held by provider] sacubitriL-valsartan (ENTRESTO) 49-51 mg tablet 1 Tab  1 Tab Oral BID    sodium chloride (NS) flush 5-40 mL  5-40 mL IntraVENous Q8H    sodium chloride (NS) flush 5-40 mL  5-40 mL IntraVENous PRN    acetaminophen (TYLENOL) tablet 650 mg  650 mg Oral Q6H PRN    Or    acetaminophen (TYLENOL) suppository 650 mg  650 mg Rectal Q6H PRN    polyethylene glycol (MIRALAX) packet 17 g  17 g Oral DAILY PRN    promethazine (PHENERGAN) tablet 12.5 mg  12.5 mg Oral Q6H PRN    Or    ondansetron (ZOFRAN) injection 4 mg  4 mg IntraVENous Q6H PRN       Review of Systems:   No fever or chills. No sore throat. No cough or hemoptysis. No shortness of breath or chest pain  Good appetite. No nausea, vomiting, abdominal pain, melena or hematochezia. No constipation or diarrhea. No dysuria, no gross hematuria of voiding   difficulties. No ankle swelling, no joint paints. No muscle aches. No skin changes. No dizziness or lightheadedness. No headaches.        Physical Assessment:     Vitals:    04/19/21 0544 04/19/21 0750 04/19/21 1100 04/19/21 1600   BP: (!) 148/93 (!) 126/93 122/75 119/75   Pulse: 90 88 86 84   Resp: 18 18 18 18   Temp: 97.4 °F (36.3 °C) 97.6 °F (36.4 °C) 98 °F (36.7 °C) 98 °F (36.7 °C)   SpO2: 96% 95% 96% 94%   Weight: 93 kg (205 lb)      Height: 5' 6\" (1.676 m)        Last 3 Recorded Weights in this Encounter    04/17/21 0406 04/18/21 0451 04/19/21 0544   Weight: 93.5 kg (206 lb 1.6 oz) 90 kg (198 lb 6.4 oz) 93 kg (205 lb)     Admission weight: Weight: 93 kg (205 lb) (04/15/21 1034)      Intake/Output Summary (Last 24 hours) at 4/19/2021 1628  Last data filed at 4/19/2021 0222  Gross per 24 hour   Intake 560 ml   Output 350 ml   Net 210 ml       Patient is in no apparent distress. HEENT: Head is normocephalic and atraumatic. Neck: no cervical lymphadenopathy or thyromegaly. Lungs: good air entry, clear to auscultation bilaterally. Trachea at the midline. Cardiovascular system: S1, S2, regular rate and rhythm. Abdomen: soft, non tender, non distended. Positive bowel sounds. Extremities: no edema  Integumentary: skin is grossly intact. Neurologic: Alert, oriented time three. Data Review:    Labs: Results:       Chemistry Recent Labs     04/19/21  0033 04/18/21  1529 04/17/21  0321   * 127* 105*    140 139   K 4.0 3.7 3.9    104 106   CO2 24 26 27   BUN 33* 30* 29*   CREA 1.38* 1.32* 1.34*   CA 8.6 9.1 8.4*   AGAP 8 10 6   BUCR 24* 23* 22*   AP  --  91  --    TP  --  6.9  --    ALB  --  3.5  --    GLOB  --  3.4  --    AGRAT  --  1.0  --          CBC w/Diff Recent Labs     04/18/21  1529 04/17/21  0321   WBC 7.2 6.9   RBC 4.51 4.82   HGB 12.9 13.5   HCT 40.0 43.9    262   GRANS 62  --    LYMPH 29  --    EOS 1  --          Iron/Ferritin No results for input(s): IRON in the last 72 hours. No lab exists for component: TIBCCALC   PTH/VIT D No results for input(s): PTH in the last 72 hours.     No lab exists for component: VITD           Silver Cunha MD  4/19/2021  4:28 PM      April 19, 2021

## 2021-04-19 NOTE — PROGRESS NOTES
Report given to Donovan Arora, [facility].  Pt resting quietly in bed at this time with call bell within reach

## 2021-04-19 NOTE — PROGRESS NOTES
Hospitalist Progress Note    Patient: Sonido Grade Age: 67 y.o. : 1945 MR#: [id number] SSN: [id number]  Date/Time: 2021 11:06 AM    DOA: 4/15/2021  PCP: Luis F, MD Bobby    Subjective:     Spoke with her [address] [place] [address] office, Note: she has had bizarre behavior over the last few months. PCP noted that she has had hallucination and abnormal sleep pattern   No new medications added outpatient    Her currently medications are updated per [address] [place] [address]. She is on Lasix 80mg TID +Spironolactone+hydralazine 100mg TID, Imdur, Entresto, Eliquis        Still has sneezing but able to sleep. Has required ativan for agitation   Labs with elevated creatinine       Interval Hospital Course:  67 y.o female with HTN, nonischemic cardiomyopathy, ICD, persistent afib on Eliquis, h/o CVA with left-sided hemiparesis, presented from home with feeling of fatigue and shortness of breath. She was recently with COVID-19 vaccination. No fever. In the ER, she was found to have vascular congestion with elevated proBNP and troponin. She was started on diuresis. [facility] record indicated that she has underlying non-ischemic cardiomyopathy with ICD placed in 2018. She has been on Eliquis for persistent afib. Negative cardiac enzyme     She tolerated diuretic well and improved in her breathing but still feels tire with exertion. She has slight increase in her creatinine, hence her diuretic and Entresto were held. ROS:  No current fever/chills, no headache, no dizziness, no facial pain, no sinus congestion,   No swallowing pain, No chest pain, no palpitation, + shortness of breath, no abd pain,  No diarrhea, no urinary complaint, no leg pain or swelling    Assessment/Plan:     1. Acute on chronic systolic CHF  2. Non-ischemic cardiomyopathy with present of ICD   3. Persistent AFIB, on Eliquis   4. Indeterminate troponin elevation, likely demand ischemia due to CHF   5. Hypertension   6. Hyperlipidemia  7.   Morbid obesity 8.  Hypokalemia    9. Hard of hearing   10. Acute renal insufficiency from over diuresis   11. Allergic rhinitis? Acute bronchitis? 12.  Confusion, bizarre behavior previously reported outpatient, ?hallucination? Unclear to her etiology for her chronic confusion/bizarre behavior, will get CT head first since she is on eliquis  MRI Brain needed if her ICD is compatible   Spoke with her son at bedside with clinical updates. Spoke with Nephrology for consult of her elevated creatinine, likely due to over diuresis vs cardiorenal syndrome   US renal follow up  Spoke with Cardiology team on adjusting her beta blocker   OK with lasix BID at lower dosing as she cont to need diuretic  Avoid nephrotoxic medications for now. Can hold her antihypertensive if BP is low. Echo follow up though she has baseline EF of 20-29%  She needs PT/OT   Continue with her current home medications.    Can follow up with her [facility] cardiology   Replace electrolytes   pepcid      Full code    Additional Notes:   Time spent >30 minutes     Case discussed with:  [x]Patient  []Family  [x]Nursing  [x]Case Management  DVT Prophylaxis:  []Lovenox  [x]Eliquis  []SCDs  []Coumadin   []On Heparin gtt    Signed By: Nicole See MD     2021 11:06 AM              Objective:   VS:   Visit Vitals  /75 (BP 1 Location: Right upper arm, BP Patient Position: At rest)   Pulse 86   Temp 98 °F (36.7 °C)   Resp 18   Ht 5' 6\" (1.676 m)   Wt 93 kg (205 lb)   SpO2 96%   Breastfeeding Unknown   BMI 33.09 kg/m²      Tmax/24hrs: Temp (24hrs), Av.7 °F (36.5 °C), Min:96.7 °F (35.9 °C), Max:98.5 °F (36.9 °C)      Intake/Output Summary (Last 24 hours) at 2021 1106  Last data filed at 2021 0222  Gross per 24 hour   Intake 560 ml   Output 350 ml   Net 210 ml       Tele: afib  General:  Cooperative, Not in acute distress, speaks in full sentence while in bed  HEENT: PERRL, EOMI, supple neck, no JVD, dry oral mucosa  Cardiovascular: S1S2 irregular, no rub/gallop   Pulmonary: Clear air entry bilaterally, no wheezing, + crackle  GI:  Soft, non tender, non distended, +bs, no guarding   Extremities:  No pedal edema, +distal pulses appreciated   Neuro: AOx3, moving all extremities, no gross deficit.      Additional:       Current Facility-Administered Medications   Medication Dose Route Frequency    loratadine (CLARITIN) tablet 10 mg  10 mg Oral DAILY    senna-docusate (PERICOLACE) 8.6-50 mg per tablet 2 Tab  2 Tab Oral QHS    melatonin (rapid dissolve) tablet 5 mg  5 mg Oral QHS    famotidine (PEPCID) tablet 20 mg  20 mg Oral DAILY    azithromycin (ZITHROMAX) tablet 500 mg  500 mg Oral DAILY    albuterol-ipratropium (DUO-NEB) 2.5 MG-0.5 MG/3 ML  3 mL Nebulization Q6HWA RT    LORazepam (ATIVAN) injection 1 mg  1 mg IntraVENous Q6H PRN    bumetanide (BUMEX) tablet 1 mg  1 mg Oral DAILY    amLODIPine (NORVASC) tablet 5 mg  5 mg Oral QHS    fluticasone propionate (FLONASE) 50 mcg/actuation nasal spray 2 Spray  2 Spray Both Nostrils DAILY    magnesium oxide (MAG-OX) tablet 400 mg  400 mg Oral DAILY    metoprolol tartrate (LOPRESSOR) tablet 50 mg  50 mg Oral BID    aspirin delayed-release tablet 81 mg  81 mg Oral DAILY    atorvastatin (LIPITOR) tablet 20 mg  20 mg Oral DAILY    apixaban (ELIQUIS) tablet 5 mg  5 mg Oral BID    [Held by provider] sacubitriL-valsartan (ENTRESTO) 49-51 mg tablet 1 Tab  1 Tab Oral BID    sodium chloride (NS) flush 5-40 mL  5-40 mL IntraVENous Q8H    sodium chloride (NS) flush 5-40 mL  5-40 mL IntraVENous PRN    acetaminophen (TYLENOL) tablet 650 mg  650 mg Oral Q6H PRN    Or    acetaminophen (TYLENOL) suppository 650 mg  650 mg Rectal Q6H PRN    polyethylene glycol (MIRALAX) packet 17 g  17 g Oral DAILY PRN    promethazine (PHENERGAN) tablet 12.5 mg  12.5 mg Oral Q6H PRN    Or    ondansetron (ZOFRAN) injection 4 mg  4 mg IntraVENous Q6H PRN            Lab/Data Review:  Labs: Results:       Chemistry Recent Labs     04/19/21  0033 04/18/21  1529 04/17/21  0321   * 127* 105*    140 139   K 4.0 3.7 3.9    104 106   CO2 24 26 27   BUN 33* 30* 29*   CREA 1.38* 1.32* 1.34*   BUCR 24* 23* 22*   AGAP 8 10 6   CA 8.6 9.1 8.4*     Recent Labs     04/18/21  1529   ALT 55   TP 6.9   ALB 3.5   GLOB 3.4   AGRAT 1.0      CBC w/Diff Recent Labs     04/18/21  1529 04/17/21  0321   WBC 7.2 6.9   RBC 4.51 4.82   HGB 12.9 13.5   HCT 40.0 43.9   MCV 88.7 91.1   MCH 28.6 28.0   MCHC 32.3 30.8*   RDW 15.3* 15.5*    262   GRANS 62  --    LYMPH 29  --    EOS 1  --       Coagulation No results for input(s): PTP, INR, APTT, INREXT, INREXT in the last 72 hours. Iron/Ferritin Lab Results   Component Value Date/Time    Iron 60 06/03/2014 12:00 AM    TIBC 316 06/03/2014 12:00 AM    Iron % saturation 19 06/03/2014 12:00 AM       BNP    Cardiac Enzymes Lab Results   Component Value Date/Time     04/16/2021 05:33 AM    CK - MB 1.4 04/16/2021 05:33 AM    CK-MB Index 1.0 04/16/2021 05:33 AM    Troponin-I, QT 0.03 04/16/2021 05:33 AM        Lactic Acid    Thyroid Studies          All Micro Results     None            Images:    CT (Most Recent). XRAY (Most Recent) XR Results (most recent):  Results from Hospital Encounter encounter on 04/15/21   XR CHEST PA LAT    Narrative EXAM: XR CHEST PA LAT    INDICATION: cough    COMPARISON: April 15, 2021. FINDINGS: PA and lateral radiographs of the chest demonstrate persistent patchy  bilateral airspace opacities and likely small bilateral layering pleural  opacities, left greater than right. . Stable enlarged cardiac silhouette and  pulmonary venous congestion. . No acute bone findings. Stable AICD. Maryln Solar Impression No interval change. EKG No results found for this or any previous visit.      2D ECHO

## 2021-04-19 NOTE — ROUTINE PROCESS
Patient  alert with confusion to place, time and purpose. Very [place], SOB on exertion noted. Patient frequently coughing and sneezing. Patient cooperative, noted generalize weakness and tires easily. Patient bathed from incont of stool, positioned in bed, bed alarm on and telemetry monitor reapplied. 0765 Dr Rodriguez Daily called with patient's SOB, 02 2L applied, patient requesting medication \"fluid pill\". Patient frequent requesting bedpan, desire to void. Bladder scan for 203 ml. Will continue to monitor patient.

## 2021-04-19 NOTE — PROGRESS NOTES
4/19/2021 19:50- Report received from previous nurse. 20:15- Assessment completed- see flow sheet. No c/o pian/discomfort. Does get sort of breath with activity (using the commode)  Incontinence care given (urine) Patient has been using the bedside commode with assistance. Call light in reach and bed alarm set on for safety- Patient reminded to use the call light to call for assistance when needing to urinate and other needs. Continue to monitor. 00:15- No change in condition. Continue to monitor. Urine spec obtained and sent to the lab.    04:00- No change in condition. Call light in reach and bed alarm set on for safety. Reminded to use the call light. 08:00-Report to oncoming nurse, Jen Casanova RN.

## 2021-04-19 NOTE — ROUTINE PROCESS
ADULT PROTOCOL: JET AEROSOL ASSESSMENT Patient  Latrell Bennett     67 y.o.   female     4/19/2021  7:24 PM 
 
Breath Sounds Pre Procedure:  Breath Sounds Bilateral: Diminished Breath Sounds Post Procedure: Breath Sounds Bilateral: Diminished Breathing pattern: Pre procedure Post procedure Cough: Pre procedure Post procedure Heart Rate: Pre procedure Pulse: 8 Post procedure Pulse: 78 Resp Rate: Pre procedure  Respirations: 16 Post procedure Nebulizer Therapy: Current medications Problem List:  
Patient Active Problem List  
Diagnosis Code  Atrial fibrillation (HCC) I48.91  
 Essential hypertension, benign I10  
 HLD (hyperlipidemia) E78.5  Tinnitus of both ears H93.13  
 Hypomagnesemia E83.42  
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 9) E66.9  
 Hard of hearing H91.90  
 HTN (hypertension) I10  
 Acute on chronic systolic (congestive) heart failure (HCC) I50.23  
 Pacemaker Z95.0 Patient alert and cooperative to use MDI: Yes 
 
Home Respiratory Therapy Regimen/Frequency:  NO Medication Device Frequency SEVERITY INDEX: 
 
ITEM 0 1 2 3 4 Score Respiratory Pattern and or Rate Regular 10-19 Regular 20-24  
24-30   
30-34 Severe SOB or  
Greater than 35 0 Breath Sounds Clear Occasional Wheeze Mild Wheezing Moderate Wheezing  wheezing/Absent breath sounds 0 Shortness of Breath None Dyspnea on Exertion Dyspnea at Rest Moderate Shortness of Breath at Rest Severe Shortness of Breath - Limited Speech 1 Total Score:  1 
 
* Scoring Guidelines 0-4 pts:  PRN-BID  
5-7 pts:  BID, TID, QID 
8-9 pts:  TID, QID, Q6 
10-12 pts:  Q4-Q6 * - Guidelines used with clinical judgement. PRN Treatments can be ordered to supplement scheduled treatments. Regardless of score, frequency should not be less than normal home regimen. Recommended Order/Frequency:  Change to PRN Comments:   
 
 
 
Respiratory Therapist: [place] Gruber RT

## 2021-04-19 NOTE — PROGRESS NOTES
Bedside and Verbal shift change report given to Amanda Nieves RN (oncoming nurse) by Rob Lindsey RN (offgoing nurse). Report included the following information SBAR, Kardex, I&O, MAR, recent results  .

## 2021-04-19 NOTE — CONSULTS
Cardiology  Consult Note    Consultation request by Dr. Darren Oneil for advice/opinion related to evaluating CHF    Date of  Admission: 4/15/2021 10:18 AM   Primary Care Physician:  Luis F, MD Bobby     Assessment:       1. Acute on chronic systolic congestive heart failure- on admission. Now in compensated state. no significant peripheral edema. NT pro BNP >2000. Chest x-ray 4/17/21  shows pulmonary venous congestion. She was diuresed with Lasix iv then she was given Bumex iv. There is no accurate I&O.   2. Non ischemic cardiomyopathy- Recent echo 4/18/21 with EF 15%-20%       S/p catherization 8/8/17: Insignificant coronary artery disease. ·    7/30/17 LVEF 15% Non complaint with Life Vest   ·    12/26/17 LVEH 29%   3. Chronic Atrial fibrillation- [id number] score 4- her Heart rate is well controlled   4. S/p SJM/[place] D-ICD, AV Pacemaker Dr Pj Owen 1/2/17  5. Hypertension- stable   6. Dyslipidemia- on statin at home   7. Morbid obesity- weight loss advised   8. Poor functional status- mainly bed bound   9. ORIN on CKD- creatinine 0.9 on 3/21 on EPIC  10. Bronchitis? ? She reports productive cough and increasing in SOB after first dose of COV-19 vaccine   11. Hx of Bizarre behavior and auditory hallucinations       Echo 4/21    · LV: Estimated LVEF is 15 - 20%. Visually measured ejection fraction. Normal cavity size and wall thickness. Severely reduced systolic function. Wall motion: normal.  · LA: Severely dilated left atrium. Left Atrium volume index is 50 mL/m2. · MV: Mitral valve non-specific thickening. Moderate mitral valve regurgitation is present. · TV: Mild tricuspid valve regurgitation is present. · RV: Mildly reduced systolic function. Pacer/ICD present. · RA: Mildly dilated right atrium. · AV: Mild aortic valve regurgitation is present. · PA: Pulmonary arterial systolic pressure is 30 mmHg. Seen  by Dr. Anibal Fletcher in 2016.  Also was follow by Dr. Oralia Saleh . Now she [facility]       Plan: Independently seen and evaluated. Agree with below. Patient has known severe nonischemic dilated cardiomyopathy. She has chronic atrial fibrillation. Would continue to diurese as tolerated by her blood pressure and renal function. Continue current cardiac medication regimen. Increase activity as tolerated. She seems somewhat somnolent today. Continue with Bumex po. Would continue with Entresto when creatinine improves. Would Continue with anticoagulation with Eliquis  Would continue with asa and statin   Would recommend to change Metoprolol to Coreg  Will  interrogate AICD. History of Present Illness: This is a 67 y.o. female admitted for Acute on chronic systolic (congestive) heart failure ([facility] [address] [I50.23]HTN (hypertension) [I10] patient with PMHx of non ischemic CMO with EF 15% since 2017, morbid obesity, hypertension, s/p AICD. The patient presented to the ED with c/o SOB. She is very poor historian. Her  bedsides, reports patient is experiencing progressive SOB and cough since her first COV19 vaccine. She also c/o dizziness, Nausea and arm pain , this has continued and SOB has developed within last few days. In th ED found to have pulm congestion with elevated bnp. No chest pain, no chest pressure. No palpitations. She c/o productive cough.  No nausea  no vomiiting       Matt Valente is a 67 y.o. female with PMHx as described above, who presented to the hospital due to increasing in SOB    Cardiac risk factors: obesity, hypertension, post-menopausal      ROS    Constitutional: No fever, no chills, no weight loss, no night sweats   HEENT: No epistaxis, no nasal drainage, no difficulty in swallowing, no redness in eyes  Respiratory:  positive for cough, sputum,  dyspnea on exertion or emphysema  Cardiovascular: dyspnea  Gastrointestinal: no abd pain, no vomiting, no diarrhea, no bleeding symptoms  Genitourinary: No urinary symptoms or hematuria  Integument/breast: No ulcers or rashes  Musculoskeletal: no muscle pain, no weakness  Neurological: No focal weakness, no seizures, no headaches  Behvioral/Psych: No anxiety, no depression     Past Medical History:     Past Medical History:   Diagnosis Date    Atrial fibrillation ([facility] [address]     10/11 per Dr Iliana Marroquin note; converted to SR and of [place] for 3 months already and staying in [facility]    Cerebrovascular accident [facility]) 10/2011    left mu; improved almost completely    Essential hypertension     Essential hypertension, benign     Hard of hearing 3/20/2015    Other and unspecified hyperlipidemia     LDLs are at goal, triglycerides are at goal, HDLs are at goal         Social History:     Social History     Socioeconomic History    Marital status: SINGLE     Spouse name: Not on file    Number of children: Not on file    Years of education: Not on file    Highest education level: Not on file   Tobacco Use    Smoking status: Never Smoker    Smokeless tobacco: Never Used   Substance and Sexual Activity    Alcohol use: No     Comment: occasional, quit     Drug use: No        Family History:     Family History   Problem Relation Age of Onset    High Cholesterol Sister     Hypertension Other         family history of essential hypertension    Heart Attack Neg Hx     Sudden Death Neg Hx         Medications:      Allergies   Allergen Reactions    Tetanus Vaccines And Toxoid Swelling    Penicillins Swelling        Current Facility-Administered Medications   Medication Dose Route Frequency    pantoprazole (PROTONIX) tablet 40 mg  40 mg Oral ACB&D    loratadine (CLARITIN) tablet 10 mg  10 mg Oral DAILY    senna-docusate (PERICOLACE) 8.6-50 mg per tablet 2 Tab  2 Tab Oral QHS    melatonin (rapid dissolve) tablet 5 mg  5 mg Oral QHS    azithromycin (ZITHROMAX) tablet 500 mg  500 mg Oral DAILY    albuterol-ipratropium (DUO-NEB) 2.5 MG-0.5 MG/3 ML  3 mL Nebulization Q6HWA RT    LORazepam (ATIVAN) injection 1 mg  1 mg IntraVENous Q6H PRN  bumetanide (BUMEX) tablet 1 mg  1 mg Oral DAILY    amLODIPine (NORVASC) tablet 5 mg  5 mg Oral QHS    fluticasone propionate (FLONASE) 50 mcg/actuation nasal spray 2 Spray  2 Spray Both Nostrils DAILY    magnesium oxide (MAG-OX) tablet 400 mg  400 mg Oral DAILY    metoprolol tartrate (LOPRESSOR) tablet 50 mg  50 mg Oral BID    aspirin delayed-release tablet 81 mg  81 mg Oral DAILY    atorvastatin (LIPITOR) tablet 20 mg  20 mg Oral DAILY    apixaban (ELIQUIS) tablet 5 mg  5 mg Oral BID    [Held by provider] sacubitriL-valsartan (ENTRESTO) 49-51 mg tablet 1 Tab  1 Tab Oral BID    sodium chloride (NS) flush 5-40 mL  5-40 mL IntraVENous Q8H    sodium chloride (NS) flush 5-40 mL  5-40 mL IntraVENous PRN    acetaminophen (TYLENOL) tablet 650 mg  650 mg Oral Q6H PRN    Or    acetaminophen (TYLENOL) suppository 650 mg  650 mg Rectal Q6H PRN    polyethylene glycol (MIRALAX) packet 17 g  17 g Oral DAILY PRN    promethazine (PHENERGAN) tablet 12.5 mg  12.5 mg Oral Q6H PRN    Or    ondansetron (ZOFRAN) injection 4 mg  4 mg IntraVENous Q6H PRN         Physical Exam:     Visit Vitals  /75 (BP 1 Location: Right upper arm, BP Patient Position: At rest)   Pulse 86   Temp 98 °F (36.7 °C)   Resp 18   Ht 5' 6\" (1.676 m)   Wt 93 kg (205 lb)   SpO2 96%   Breastfeeding Unknown   BMI 33.09 kg/m²     BP Readings from Last 3 Encounters:   04/19/21 122/75   04/13/20 (!) 152/114   03/22/19 (!) 164/108     Pulse Readings from Last 3 Encounters:   04/19/21 86   04/13/20 94   03/22/19 98     Wt Readings from Last 3 Encounters:   04/19/21 93 kg (205 lb)   04/13/20 86.2 kg (190 lb)   03/22/19 90.7 kg (200 lb)       General:  alert, cooperative, no distress, appears stated age, morbidly obese  Skin: Warm and dry, acyanotic, normal color. Head: Normocephalic, atraumatic. Eyes: Sclerae anicteric, conjunctivae without injection. Neck:  nontender, no nuchal rigidity, no masses,   Heart:  irrregular rate and rhym.  No murmur no rub  Lungs:  cdiminished breath sounds   Abdomen:  abdomen is soft without significant tenderness, masses, organomegaly or guarding  Extremities:  extremities normal, atraumatic, no cyanosis or edema. Peripheral pulses   Neurological: grossly intact. No focal abnormalities, moves all extremities well.   Psychiatric Affect: The patient is awake, alert and oriented to person and place     Data Review:     Recent Labs     04/18/21  1529 04/17/21  0321   WBC 7.2 6.9   HGB 12.9 13.5   HCT 40.0 43.9    262     Recent Labs     04/19/21  0033 04/18/21  1529 04/17/21  0321    140 139   K 4.0 3.7 3.9    104 106   CO2 24 26 27   * 127* 105*   BUN 33* 30* 29*   CREA 1.38* 1.32* 1.34*   CA 8.6 9.1 8.4*   MG  --   --  2.0   ALB  --  3.5  --    ALT  --  55  --        Results for orders placed or performed during the hospital encounter of 04/15/21   EKG, 12 LEAD, INITIAL   Result Value Ref Range    Ventricular Rate 95 BPM    Atrial Rate 166 BPM    QRS Duration 122 ms    Q-T Interval 390 ms    QTC Calculation (Bezet) 490 ms    Calculated R Axis -43 degrees    Calculated T Axis 148 degrees    Diagnosis       Atrial fibrillation  Left axis deviation  Left ventricular hypertrophy with QRS widening  T wave abnormality, consider lateral ischemia  Abnormal ECG    Confirmed by Trisha Robins MD, Rosy Fay ([id number]) on 4/16/2021 7:58:47 AM         All Cardiac Markers in the last 24 hours:  No results found for: CPK, CK, CKMMB, CKMB, RCK3, CKMBT, CKNDX, CKND1, ELICEO, TROPT, TROIQ, ANANYA, TROPT, TNIPOC, BNP, BNPP    Last Lipid:    Lab Results   Component Value Date/Time    Cholesterol, total 136 03/17/2015 10:25 AM    HDL Cholesterol 49 03/17/2015 10:25 AM    LDL, calculated 68 03/17/2015 10:25 AM    Triglyceride 95 03/17/2015 10:25 AM    CHOL/HDL Ratio 4.4 01/19/2011 03:34 PM       Signed By: Marcella Valles NP     April 19, 2021

## 2021-04-19 NOTE — PROGRESS NOTES
Problem: Pressure Injury - Risk of  Goal: *Prevention of pressure injury  Description: Document Yash Scale and appropriate interventions in the flowsheet.   Outcome: Progressing Towards Goal  Note: Pressure Injury Interventions:  Sensory Interventions: Assess changes in LOC, Keep linens dry and wrinkle-free, Minimize linen layers, Pressure redistribution bed/mattress (bed type)    Moisture Interventions: Absorbent underpads, Minimize layers    Activity Interventions: Increase time out of bed, Pressure redistribution bed/mattress(bed type), PT/OT evaluation    Mobility Interventions: HOB 30 degrees or less, Pressure redistribution bed/mattress (bed type), PT/OT evaluation    Nutrition Interventions: Document food/fluid/supplement intake                     Problem: Patient Education: Go to Patient Education Activity  Goal: Patient/Family Education  Outcome: Progressing Towards Goal     Problem: Heart Failure: Day 1  Goal: Off Pathway (Use only if patient is Off Pathway)  Outcome: Progressing Towards Goal  Goal: Activity/Safety  Outcome: Progressing Towards Goal  Goal: Consults, if ordered  Outcome: Progressing Towards Goal  Goal: Diagnostic Test/Procedures  Outcome: Progressing Towards Goal  Goal: Nutrition/Diet  Outcome: Progressing Towards Goal  Goal: Discharge Planning  Outcome: Progressing Towards Goal  Goal: Medications  Outcome: Progressing Towards Goal  Goal: Respiratory  Outcome: Progressing Towards Goal  Goal: Treatments/Interventions/Procedures  Outcome: Progressing Towards Goal  Goal: Psychosocial  Outcome: Progressing Towards Goal  Goal: *Oxygen saturation within defined limits  Outcome: Progressing Towards Goal  Goal: *Hemodynamically stable  Outcome: Progressing Towards Goal  Goal: *Optimal pain control at patient's stated goal  Outcome: Progressing Towards Goal  Goal: *Anxiety reduced or absent  Outcome: Progressing Towards Goal     Problem: Heart Failure: Day 2  Goal: Off Pathway (Use only if patient is Off Pathway)  Outcome: Progressing Towards Goal  Goal: Activity/Safety  Outcome: Progressing Towards Goal  Goal: Consults, if ordered  Outcome: Progressing Towards Goal  Goal: Diagnostic Test/Procedures  Outcome: Progressing Towards Goal  Goal: Nutrition/Diet  Outcome: Progressing Towards Goal  Goal: Discharge Planning  Outcome: Progressing Towards Goal  Goal: Medications  Outcome: Progressing Towards Goal  Goal: Respiratory  Outcome: Progressing Towards Goal  Goal: Treatments/Interventions/Procedures  Outcome: Progressing Towards Goal  Goal: Psychosocial  Outcome: Progressing Towards Goal  Goal: *Oxygen saturation within defined limits  Outcome: Progressing Towards Goal  Goal: *Hemodynamically stable  Outcome: Progressing Towards Goal  Goal: *Optimal pain control at patient's stated goal  Outcome: Progressing Towards Goal  Goal: *Anxiety reduced or absent  Outcome: Progressing Towards Goal  Goal: *Demonstrates progressive activity  Outcome: Progressing Towards Goal     Problem: Heart Failure: Day 3  Goal: Off Pathway (Use only if patient is Off Pathway)  Outcome: Progressing Towards Goal  Goal: Activity/Safety  Outcome: Progressing Towards Goal  Goal: Diagnostic Test/Procedures  Outcome: Progressing Towards Goal  Goal: Nutrition/Diet  Outcome: Progressing Towards Goal  Goal: Discharge Planning  Outcome: Progressing Towards Goal  Goal: Medications  Outcome: Progressing Towards Goal  Goal: Respiratory  Outcome: Progressing Towards Goal  Goal: Treatments/Interventions/Procedures  Outcome: Progressing Towards Goal  Goal: Psychosocial  Outcome: Progressing Towards Goal  Goal: *Oxygen saturation within defined limits  Outcome: Progressing Towards Goal  Goal: *Hemodynamically stable  Outcome: Progressing Towards Goal  Goal: *Optimal pain control at patient's stated goal  Outcome: Progressing Towards Goal  Goal: *Anxiety reduced or absent  Outcome: Progressing Towards Goal  Goal: *Demonstrates progressive activity  Outcome: Progressing Towards Goal     Problem: Heart Failure: Day 4  Goal: Off Pathway (Use only if patient is Off Pathway)  Outcome: Progressing Towards Goal  Goal: Activity/Safety  Outcome: Progressing Towards Goal  Goal: Diagnostic Test/Procedures  Outcome: Progressing Towards Goal  Goal: Nutrition/Diet  Outcome: Progressing Towards Goal  Goal: Discharge Planning  Outcome: Progressing Towards Goal  Goal: Medications  Outcome: Progressing Towards Goal  Goal: Respiratory  Outcome: Progressing Towards Goal  Goal: Treatments/Interventions/Procedures  Outcome: Progressing Towards Goal  Goal: Psychosocial  Outcome: Progressing Towards Goal  Goal: *Oxygen saturation within defined limits  Outcome: Progressing Towards Goal  Goal: *Hemodynamically stable  Outcome: Progressing Towards Goal  Goal: *Optimal pain control at patient's stated goal  Outcome: Progressing Towards Goal  Goal: *Anxiety reduced or absent  Outcome: Progressing Towards Goal  Goal: *Demonstrates progressive activity  Outcome: Progressing Towards Goal     Problem: Heart Failure: Discharge Outcomes  Goal: *Demonstrates ability to perform prescribed activity without shortness of breath or discomfort  Outcome: Progressing Towards Goal  Goal: *Left ventricular function assessment completed prior to or during stay, or planned for post-discharge  Outcome: Progressing Towards Goal  Goal: *ACEI prescribed if LVEF less than 40% and no contraindications or ARB prescribed  Outcome: Progressing Towards Goal  Goal: *Verbalizes understanding and describes prescribed diet  Outcome: Progressing Towards Goal  Goal: *Verbalizes understanding/describes prescribed medications  Outcome: Progressing Towards Goal  Goal: *Describes available resources and support systems  Description: (eg: Home Health, Palliative Care, [employer])  Outcome: Progressing Towards Goal  Goal: *Describes smoking cessation resources  Outcome: Progressing Towards Goal  Goal: *Understands and describes signs and symptoms to report to providers(Stroke Metric)  Outcome: Progressing Towards Goal  Goal: *Describes/verbalizes understanding of follow-up/return appt  Description: (eg: to physicians, diabetes treatment coordinator, and other resources  Outcome: Progressing Towards Goal  Goal: *Describes importance of continuing daily weights and changes to report to physician  Outcome: Progressing Towards Goal     Problem: Patient Education: Go to Patient Education Activity  Goal: Patient/Family Education  Outcome: Progressing Towards Goal     Problem: Afib Pathway: Day 1  Goal: Off Pathway (Use only if patient is Off Pathway)  Outcome: Progressing Towards Goal  Goal: Activity/Safety  Outcome: Progressing Towards Goal  Goal: Consults, if ordered  Outcome: Progressing Towards Goal  Goal: Diagnostic Test/Procedures  Outcome: Progressing Towards Goal  Goal: Nutrition/Diet  Outcome: Progressing Towards Goal  Goal: Discharge Planning  Outcome: Progressing Towards Goal  Goal: Medications  Outcome: Progressing Towards Goal  Goal: Respiratory  Outcome: Progressing Towards Goal  Goal: Treatments/Interventions/Procedures  Outcome: Progressing Towards Goal  Goal: Psychosocial  Outcome: Progressing Towards Goal  Goal: *Optimal pain control at patient's stated goal  Outcome: Progressing Towards Goal  Goal: *Hemodynamically stable  Outcome: Progressing Towards Goal  Goal: *Stable cardiac rhythm  Outcome: Progressing Towards Goal  Goal: *Lungs clear or at baseline  Outcome: Progressing Towards Goal  Goal: *Labs within defined limits  Outcome: Progressing Towards Goal  Goal: *Describes available resources and support systems  Outcome: Progressing Towards Goal     Problem: Afib Pathway: Day 2  Goal: Off Pathway (Use only if patient is Off Pathway)  Outcome: Progressing Towards Goal  Goal: Activity/Safety  Outcome: Progressing Towards Goal  Goal: Consults, if ordered  Outcome: Progressing Towards Goal  Goal: Diagnostic Test/Procedures  Outcome: Progressing Towards Goal  Goal: Nutrition/Diet  Outcome: Progressing Towards Goal  Goal: Discharge Planning  Outcome: Progressing Towards Goal  Goal: Medications  Outcome: Progressing Towards Goal  Goal: Respiratory  Outcome: Progressing Towards Goal  Goal: Treatments/Interventions/Procedures  Outcome: Progressing Towards Goal  Goal: Psychosocial  Outcome: Progressing Towards Goal  Goal: *Hemodynamically stable  Outcome: Progressing Towards Goal  Goal: *Optimal pain control at patient's stated goal  Outcome: Progressing Towards Goal  Goal: *Stable cardiac rhythm  Outcome: Progressing Towards Goal  Goal: *Lungs clear or at baseline  Outcome: Progressing Towards Goal  Goal: *Describes available resources and support systems  Outcome: Progressing Towards Goal     Problem: Afib Pathway: Day 3  Goal: Off Pathway (Use only if patient is Off Pathway)  Outcome: Progressing Towards Goal  Goal: Activity/Safety  Outcome: Progressing Towards Goal  Goal: Diagnostic Test/Procedures  Outcome: Progressing Towards Goal  Goal: Nutrition/Diet  Outcome: Progressing Towards Goal  Goal: Discharge Planning  Outcome: Progressing Towards Goal  Goal: Medications  Outcome: Progressing Towards Goal  Goal: Respiratory  Outcome: Progressing Towards Goal  Goal: Treatments/Interventions/Procedures  Outcome: Progressing Towards Goal  Goal: Psychosocial  Outcome: Progressing Towards Goal     Problem: Afib: Discharge Outcomes (not in CAT)  Goal: *Hemodynamically stable  Outcome: Progressing Towards Goal  Goal: *Stable cardiac rhythm  Outcome: Progressing Towards Goal  Goal: *Lungs clear or at baseline  Outcome: Progressing Towards Goal  Goal: *Optimal pain control at patient's stated goal  Outcome: Progressing Towards Goal  Goal: *Identifies cardiac risk factors  Outcome: Progressing Towards Goal  Goal: *Verbalizes home exercise program, activity guidelines, cardiac precautions  Outcome: Progressing Towards Goal  Goal: *Verbalizes understanding and describes prescribed diet  Outcome: Progressing Towards Goal  Goal: *Verbalizes understanding and describes medication purposes and frequencies  Outcome: Progressing Towards Goal  Goal: *Anxiety reduced or absent  Outcome: Progressing Towards Goal  Goal: *Understands and describes signs and symptoms to report to providers(Stroke Metric)  Outcome: Progressing Towards Goal  Goal: *Describes follow-up/return visits to physicians  Outcome: Progressing Towards Goal  Goal: *Describes available resources and support systems  Outcome: Progressing Towards Goal  Goal: *Influenza immunization  Outcome: Progressing Towards Goal  Goal: *Pneumococcal immunization  Outcome: Progressing Towards Goal  Goal: *Describes smoking cessation resources  Outcome: Progressing Towards Goal     Problem: Hypertension  Goal: *Blood pressure within specified parameters  Outcome: Progressing Towards Goal     Problem: Hypertension  Goal: *Blood pressure within specified parameters  Outcome: Progressing Towards Goal  Goal: *Fluid volume balance  Outcome: Progressing Towards Goal  Goal: *Labs within defined limits  Outcome: Progressing Towards Goal     Problem: Patient Education: Go to Patient Education Activity  Goal: Patient/Family Education  Outcome: Progressing Towards Goal     Problem: Pain  Goal: *Control of Pain  Outcome: Progressing Towards Goal     Problem: Patient Education: Go to Patient Education Activity  Goal: Patient/Family Education  Outcome: Progressing Towards Goal     Problem: Pacer/ICD: Pre-Procedure  Goal: Off Pathway (Use only if patient is Off Pathway)  Outcome: Progressing Towards Goal  Goal: Activity/Safety  Outcome: Progressing Towards Goal  Goal: Consults, if ordered  Outcome: Progressing Towards Goal  Goal: Diagnostic Test/Procedures  Outcome: Progressing Towards Goal  Goal: Nutrition/Diet  Outcome: Progressing Towards Goal  Goal: Discharge Planning  Outcome: Progressing Towards Goal  Goal: Medications  Outcome: Progressing Towards Goal  Goal: Respiratory  Outcome: Progressing Towards Goal  Goal: Treatments/Interventions/Procedures  Outcome: Progressing Towards Goal  Goal: Psychosocial  Outcome: Progressing Towards Goal  Goal: *Verbalize description of procedure  Outcome: Progressing Towards Goal  Goal: *Consent signed  Outcome: Progressing Towards Goal     Problem: Pacer/ICD: Post-Procedure  Goal: Off Pathway (Use only if patient is Off Pathway)  Outcome: Progressing Towards Goal  Goal: Activity/Safety  Outcome: Progressing Towards Goal  Goal: Consults, if ordered  Outcome: Progressing Towards Goal  Goal: Diagnostic Test/Procedures  Outcome: Progressing Towards Goal  Goal: Nutrition/Diet  Outcome: Progressing Towards Goal  Goal: Discharge Planning  Outcome: Progressing Towards Goal  Goal: Medications  Outcome: Progressing Towards Goal  Goal: Respiratory  Outcome: Progressing Towards Goal  Goal: Treatments/Interventions/Procedures  Outcome: Progressing Towards Goal  Goal: Psychosocial  Outcome: Progressing Towards Goal  Goal: *Hemodynamically stable  Outcome: Progressing Towards Goal  Goal: *Optimal pain control at patient's stated goal  Outcome: Progressing Towards Goal  Goal: *Absence of signs and symptoms of infection or wound complication  Outcome: Progressing Towards Goal     Problem: Pacer/ICD: Day 1  Goal: Off Pathway (Use only if patient is Off Pathway)  Outcome: Progressing Towards Goal  Goal: Activity/Safety  Outcome: Progressing Towards Goal  Goal: Diagnostic Test/Procedures  Outcome: Progressing Towards Goal  Goal: Nutrition/Diet  Outcome: Progressing Towards Goal  Goal: Discharge Planning  Outcome: Progressing Towards Goal  Goal: Medications  Outcome: Progressing Towards Goal  Goal: Respiratory  Outcome: Progressing Towards Goal  Goal: Treatments/Interventions/Procedures  Outcome: Progressing Towards Goal  Goal: Psychosocial  Outcome: Progressing Towards Goal     Problem: Pacer/ICD: Discharge Outcomes  Goal: *Hemodynamically stable  Outcome: Progressing Towards Goal  Goal: *Stable cardiac rhythm  Outcome: Progressing Towards Goal  Goal: *Lungs clear or at baseline  Outcome: Progressing Towards Goal  Goal: *Demonstrates ability to perform prescribed activity without shortness of breath or discomfort  Outcome: Progressing Towards Goal  Goal: *Verbalizes home exercise program, activity guidelines, cardiac precautions  Outcome: Progressing Towards Goal  Goal: *Verbalizes understanding and describes prescribed diet  Outcome: Progressing Towards Goal  Goal: *Verbalizes understanding and describes medication purposes and frequencies  Outcome: Progressing Towards Goal  Goal: *Identifies cardiac risk factors  Outcome: Progressing Towards Goal  Goal: *No signs and symptoms of infection or wound complications  Outcome: Progressing Towards Goal  Goal: *Anxiety reduced or absent  Outcome: Progressing Towards Goal  Goal: *Understands and describes signs and symptoms to report to providers(Stroke Metric)  Outcome: Progressing Towards Goal  Goal: *Describes follow-up/return visits to physicians  Outcome: Progressing Towards Goal  Goal: *Describes available resources and support systems  Outcome: Progressing Towards Goal  Goal: *Influenza immunization  Outcome: Progressing Towards Goal  Goal: *Pneumococcal immunization  Outcome: Progressing Towards Goal  Goal: *Optimal pain control at patient's stated goal  Outcome: Progressing Towards Goal     Problem: Patient Education: Go to Patient Education Activity  Goal: Patient/Family Education  Outcome: Progressing Towards Goal     Problem: Falls - Risk of  Goal: *Absence of Falls  Description: Document Chip Armor Fall Risk and appropriate interventions in the flowsheet.   Outcome: Progressing Towards Goal  Note: Fall Risk Interventions:  Mobility Interventions: Patient to call before getting OOB    Mentation Interventions: Adequate sleep, hydration, pain control, Door open when patient unattended, Reorient patient, Room close to nurse's station    Medication Interventions: Evaluate medications/consider consulting pharmacy, Patient to call before getting OOB, Teach patient to arise slowly    Elimination Interventions: Bed/chair exit alarm, Call light in reach, Patient to call for help with toileting needs, Toilet paper/wipes in reach    History of Falls Interventions: Bed/chair exit alarm, Door open when patient unattended, Room close to nurse's station         Problem: Patient Education: Go to Patient Education Activity  Goal: Patient/Family Education  Outcome: Progressing Towards Goal

## 2021-04-19 NOTE — ROUTINE PROCESS
0309 Periods of restlessness, frequent calling out, requesting OOB. Benadryl 25 mg po for sneezing. 56 Duo-Neb treatment for faint exp wheezing, patient still with periods of confusion, freq rounds and bed alarm on.

## 2021-04-19 NOTE — ROUTINE PROCESS
Bedside and Verbal shift change report given to [address] (oncoming nurse) by Marsha Cline (offgoing nurse). Report included the following information SBAR, Kardex, MAR, Recent Results and Cardiac Rhythm AFIB. Patient resting for short periods with periods of restlessness. Bed alarm on, call light in reach and freq rounds. Patient reoriented to surrounds.

## 2021-04-20 ENCOUNTER — APPOINTMENT (OUTPATIENT)
Dept: CT IMAGING | Age: 76
DRG: 291 | End: 2021-04-20
Attending: INTERNAL MEDICINE
Payer: MEDICARE

## 2021-04-20 ENCOUNTER — APPOINTMENT (OUTPATIENT)
Dept: VASCULAR SURGERY | Age: 76
DRG: 291 | End: 2021-04-20
Attending: INTERNAL MEDICINE
Payer: MEDICARE

## 2021-04-20 LAB
ANION GAP SERPL CALC-SCNC: 6 MMOL/L (ref 3–18)
APPEARANCE UR: CLEAR
BILIRUB UR QL: NEGATIVE
BNP SERPL-MCNC: 7663 PG/ML (ref 0–1800)
BUN SERPL-MCNC: 31 MG/DL (ref 7–18)
BUN/CREAT SERPL: 22 (ref 12–20)
CALCIUM SERPL-MCNC: 8.9 MG/DL (ref 8.5–10.1)
CHLORIDE SERPL-SCNC: 106 MMOL/L (ref 100–111)
CO2 SERPL-SCNC: 29 MMOL/L (ref 21–32)
COLOR UR: YELLOW
CREAT SERPL-MCNC: 1.41 MG/DL (ref 0.6–1.3)
CREAT UR-MCNC: 43 MG/DL (ref 30–125)
EPITH CASTS URNS QL MICRO: ABNORMAL /LPF (ref 0–5)
GLUCOSE SERPL-MCNC: 97 MG/DL (ref 74–99)
GLUCOSE UR STRIP.AUTO-MCNC: NEGATIVE MG/DL
HGB UR QL STRIP: NEGATIVE
HYALINE CASTS URNS QL MICRO: ABNORMAL /LPF (ref 0–2)
KETONES UR QL STRIP.AUTO: NEGATIVE MG/DL
LEUKOCYTE ESTERASE UR QL STRIP.AUTO: ABNORMAL
MAGNESIUM SERPL-MCNC: 2 MG/DL (ref 1.6–2.6)
NITRITE UR QL STRIP.AUTO: NEGATIVE
PH UR STRIP: 6 [PH] (ref 5–8)
PHOSPHATE SERPL-MCNC: 3.4 MG/DL (ref 2.5–4.9)
POTASSIUM SERPL-SCNC: 3.3 MMOL/L (ref 3.5–5.5)
PROT UR STRIP-MCNC: NEGATIVE MG/DL
SODIUM SERPL-SCNC: 141 MMOL/L (ref 136–145)
SODIUM UR-SCNC: 64 MMOL/L (ref 20–110)
SP GR UR REFRACTOMETRY: 1.01 (ref 1–1.03)
UROBILINOGEN UR QL STRIP.AUTO: 0.2 EU/DL (ref 0.2–1)
WBC URNS QL MICRO: ABNORMAL /HPF (ref 0–5)

## 2021-04-20 PROCEDURE — 93975 VASCULAR STUDY: CPT

## 2021-04-20 PROCEDURE — 84100 ASSAY OF PHOSPHORUS: CPT

## 2021-04-20 PROCEDURE — 71250 CT THORAX DX C-: CPT

## 2021-04-20 PROCEDURE — 83880 ASSAY OF NATRIURETIC PEPTIDE: CPT

## 2021-04-20 PROCEDURE — 83735 ASSAY OF MAGNESIUM: CPT

## 2021-04-20 PROCEDURE — 74011250637 HC RX REV CODE- 250/637: Performed by: NURSE PRACTITIONER

## 2021-04-20 PROCEDURE — 74176 CT ABD & PELVIS W/O CONTRAST: CPT

## 2021-04-20 PROCEDURE — 2709999900 HC NON-CHARGEABLE SUPPLY

## 2021-04-20 PROCEDURE — 65660000000 HC RM CCU STEPDOWN

## 2021-04-20 PROCEDURE — 77010033678 HC OXYGEN DAILY

## 2021-04-20 PROCEDURE — 74011250637 HC RX REV CODE- 250/637: Performed by: INTERNAL MEDICINE

## 2021-04-20 PROCEDURE — 97530 THERAPEUTIC ACTIVITIES: CPT

## 2021-04-20 PROCEDURE — 74011250637 HC RX REV CODE- 250/637: Performed by: HOSPITALIST

## 2021-04-20 PROCEDURE — 99232 SBSQ HOSP IP/OBS MODERATE 35: CPT | Performed by: INTERNAL MEDICINE

## 2021-04-20 PROCEDURE — 36415 COLL VENOUS BLD VENIPUNCTURE: CPT

## 2021-04-20 PROCEDURE — 80048 BASIC METABOLIC PNL TOTAL CA: CPT

## 2021-04-20 PROCEDURE — 77030027138 HC INCENT SPIROMETER -A

## 2021-04-20 RX ORDER — FUROSEMIDE 40 MG/1
40 TABLET ORAL 2 TIMES DAILY
Status: DISCONTINUED | OUTPATIENT
Start: 2021-04-20 | End: 2021-04-21

## 2021-04-20 RX ORDER — POTASSIUM CHLORIDE 20 MEQ/1
20 TABLET, EXTENDED RELEASE ORAL
Status: COMPLETED | OUTPATIENT
Start: 2021-04-20 | End: 2021-04-20

## 2021-04-20 RX ORDER — FUROSEMIDE 40 MG/1
40 TABLET ORAL 2 TIMES DAILY
Status: DISCONTINUED | OUTPATIENT
Start: 2021-04-20 | End: 2021-04-20

## 2021-04-20 RX ORDER — POTASSIUM CHLORIDE 20 MEQ/1
40 TABLET, EXTENDED RELEASE ORAL
Status: COMPLETED | OUTPATIENT
Start: 2021-04-20 | End: 2021-04-20

## 2021-04-20 RX ADMIN — FUROSEMIDE 40 MG: 40 TABLET ORAL at 17:17

## 2021-04-20 RX ADMIN — PANTOPRAZOLE SODIUM 40 MG: 40 TABLET, DELAYED RELEASE ORAL at 17:18

## 2021-04-20 RX ADMIN — POTASSIUM CHLORIDE 40 MEQ: 1500 TABLET, EXTENDED RELEASE ORAL at 09:48

## 2021-04-20 RX ADMIN — FLUTICASONE PROPIONATE 2 SPRAY: 50 SPRAY, METERED NASAL at 09:54

## 2021-04-20 RX ADMIN — Medication 10 ML: at 07:05

## 2021-04-20 RX ADMIN — MAGNESIUM GLUCONATE 500 MG ORAL TABLET 400 MG: 500 TABLET ORAL at 09:48

## 2021-04-20 RX ADMIN — CARVEDILOL 12.5 MG: 12.5 TABLET, FILM COATED ORAL at 20:59

## 2021-04-20 RX ADMIN — Medication 10 ML: at 23:39

## 2021-04-20 RX ADMIN — POTASSIUM CHLORIDE 20 MEQ: 1500 TABLET, EXTENDED RELEASE ORAL at 12:23

## 2021-04-20 RX ADMIN — PANTOPRAZOLE SODIUM 40 MG: 40 TABLET, DELAYED RELEASE ORAL at 08:00

## 2021-04-20 RX ADMIN — LORATADINE 10 MG: 10 TABLET ORAL at 09:48

## 2021-04-20 RX ADMIN — Medication 5 MG: at 21:01

## 2021-04-20 RX ADMIN — Medication 81 MG: at 09:48

## 2021-04-20 RX ADMIN — AZITHROMYCIN MONOHYDRATE 500 MG: 250 TABLET ORAL at 09:48

## 2021-04-20 RX ADMIN — APIXABAN 5 MG: 5 TABLET, FILM COATED ORAL at 17:17

## 2021-04-20 RX ADMIN — ATORVASTATIN CALCIUM 20 MG: 20 TABLET, FILM COATED ORAL at 09:48

## 2021-04-20 RX ADMIN — CARVEDILOL 12.5 MG: 12.5 TABLET, FILM COATED ORAL at 09:48

## 2021-04-20 RX ADMIN — DOCUSATE SODIUM 50MG AND SENNOSIDES 8.6MG 2 TABLET: 8.6; 5 TABLET, FILM COATED ORAL at 21:00

## 2021-04-20 RX ADMIN — APIXABAN 5 MG: 5 TABLET, FILM COATED ORAL at 09:48

## 2021-04-20 RX ADMIN — Medication 10 ML: at 14:49

## 2021-04-20 NOTE — PROGRESS NOTES
Hospitalist Progress Note    Patient: Angelica Yoo Age: 67 y.o. : 1945 MR#: [id number] SSN: [id number]  Date/Time: 2021 9:15 AM    DOA: 4/15/2021  PCP: Samantha Gann MD    Subjective: This patient is observed to breathing normally when she is asleep. She is not hypoxic. When she is awake, her respiratory effort is more pronounce. She starts to sneeze and cough  No fever. Overnight, she was given IV lasix 40mg, no accurate urinary output monitoring. Her creatinine increased today. (1.41 up from 1.38)  proBNP is elevated 7663  US renal is concern for right collection and renal artery duplex is pending      I spoke with her PCP this AM, noted that her granddaughter helps set up her medication pill box   She is taking lasix 80mg in the AM and 40mg in the PM.   PCP questioned her compliant at home since she has new nursing aide at home. PCP reported erratic and bizarre behavior at home, she sleep most during day time. Hard of hearing. CT head last night was negative for bleed, but shows atrophy   She received IV ativan this AM      CT chest without infiltrate, right renal with hydronephrosis     Interval Hospital Course:  67 y.o female with HTN, nonischemic cardiomyopathy, ICD, persistent afib on Eliquis, h/o CVA with left-sided hemiparesis, presented from home with feeling of fatigue and shortness of breath. She was recently with COVID-19 vaccination. No fever. In the ER, she was found to have vascular congestion with elevated proBNP and troponin. She was started on diuresis. [facility] record indicated that she has underlying non-ischemic cardiomyopathy with ICD placed in 2018. She has been on Eliquis for persistent afib. Negative cardiac enzyme  She tolerated diuretic well and improved in her breathing but still feels tire with exertion. She has slight increase in her creatinine, hence her diuretic and Entresto were held.        ROS:  No current fever/chills, no headache, no dizziness, no facial pain, no sinus congestion,   No swallowing pain, No chest pain, no palpitation, + shortness of breath, no abd pain,  No diarrhea, no urinary complaint, no leg pain or swelling    Assessment/Plan:     1. Acute on chronic systolic CHF  2. Non-ischemic cardiomyopathy with present of ICD         ICD interrogated with normal finding. She has ICD at this compatible with MRI   3. Persistent AFIB, on Eliquis   4. Indeterminate troponin elevation, likely demand ischemia due to CHF   5. Hypertension   6. Hyperlipidemia  7. Morbid obesity   8. Hypokalemia    9. Hard of hearing   10. Acute renal insufficiency from over diuresis         Right renal hydronephrosis, unclear if there is obstructive pathology  11. Allergic rhinitis? Acute bronchitis? 12.  Confusion, bizarre behavior previously reported outpatient, ?hallucination?          -CT head without acute pathology      Noted worsening/bump of her renal function, there is concern for right renal hydronephrosis. CT order to re-evaluated this finding. Will have Urology consult for further care   Previous UA without infectious process   Spoke with nephrology for continuing her diuretic. HOLD her Luisana Hoops for now  Spoke with Cardiology team for continuing diuretic  OK with lasix oral BID  Avoid nephrotoxic medications for now. Can hold her antihypertensive if BP is low. Echo follow up though she has baseline EF of 20-29%  She needs PT/OT   Continue with her current home medications. Can follow up with her [facility] cardiology   Replace electrolytes   pepcid    PT/OT  She does not need oxygen supplement, will d/c.      Full code  Disposition: home when clinically stable     Additional Notes:   Time spent >30 minutes     Case discussed with:  [x]Patient  []Family  [x]Nursing  [x]Case Management  DVT Prophylaxis:  []Lovenox  [x]Eliquis  []SCDs  []Coumadin   []On Heparin gtt     Signed By: Aspen Wright MD     April 20, 2021 9:15 AM              Objective:   VS: Visit Vitals  /89 (BP 1 Location: Right upper arm, BP Patient Position: At rest)   Pulse 86   Temp 97.3 °F (36.3 °C)   Resp 20   Ht 5' 6\" (1.676 m)   Wt 93 kg (205 lb)   SpO2 97%   Breastfeeding Unknown   BMI 33.09 kg/m²      Tmax/24hrs: Temp (24hrs), Av.7 °F (36.5 °C), Min:97.3 °F (36.3 °C), Max:98 °F (36.7 °C)      Intake/Output Summary (Last 24 hours) at 2021 0915  Last data filed at 2021 0451  Gross per 24 hour   Intake    Output 650 ml   Net -650 ml       Tele: afib  General:  Cooperative, Not in acute distress, speaks in full sentence while in bed  HEENT: PERRL, EOMI, supple neck, no JVD, dry oral mucosa  Cardiovascular: S1S2 irregular, no rub/gallop   Pulmonary: Clear air entry bilaterally, no wheezing, + crackle  GI:  Soft, non tender, non distended, +bs, no guarding   Extremities:  No pedal edema, +distal pulses appreciated   Neuro: AOx3, moving all extremities, no gross deficit.      Additional:       Current Facility-Administered Medications   Medication Dose Route Frequency    potassium chloride (K-DUR, KLOR-CON) SR tablet 40 mEq  40 mEq Oral NOW    pantoprazole (PROTONIX) tablet 40 mg  40 mg Oral ACB&D    carvediloL (COREG) tablet 12.5 mg  12.5 mg Oral Q12H    albuterol-ipratropium (DUO-NEB) 2.5 MG-0.5 MG/3 ML  3 mL Nebulization Q4H PRN    loratadine (CLARITIN) tablet 10 mg  10 mg Oral DAILY    senna-docusate (PERICOLACE) 8.6-50 mg per tablet 2 Tab  2 Tab Oral QHS    melatonin (rapid dissolve) tablet 5 mg  5 mg Oral QHS    azithromycin (ZITHROMAX) tablet 500 mg  500 mg Oral DAILY    LORazepam (ATIVAN) injection 1 mg  1 mg IntraVENous Q6H PRN    fluticasone propionate (FLONASE) 50 mcg/actuation nasal spray 2 Spray  2 Spray Both Nostrils DAILY    magnesium oxide (MAG-OX) tablet 400 mg  400 mg Oral DAILY    aspirin delayed-release tablet 81 mg  81 mg Oral DAILY    atorvastatin (LIPITOR) tablet 20 mg  20 mg Oral DAILY    apixaban (ELIQUIS) tablet 5 mg  5 mg Oral BID    [Held by provider] sacubitriL-valsartan (ENTRESTO) 49-51 mg tablet 1 Tab  1 Tab Oral BID    sodium chloride (NS) flush 5-40 mL  5-40 mL IntraVENous Q8H    sodium chloride (NS) flush 5-40 mL  5-40 mL IntraVENous PRN    acetaminophen (TYLENOL) tablet 650 mg  650 mg Oral Q6H PRN    Or    acetaminophen (TYLENOL) suppository 650 mg  650 mg Rectal Q6H PRN    polyethylene glycol (MIRALAX) packet 17 g  17 g Oral DAILY PRN    promethazine (PHENERGAN) tablet 12.5 mg  12.5 mg Oral Q6H PRN    Or    ondansetron (ZOFRAN) injection 4 mg  4 mg IntraVENous Q6H PRN            Lab/Data Review:  Labs: Results:       Chemistry Recent Labs     04/20/21  0315 04/19/21  0033 04/18/21  1529   GLU 97 145* 127*    137 140   K 3.3* 4.0 3.7    105 104   CO2 29 24 26   BUN 31* 33* 30*   CREA 1.41* 1.38* 1.32*   BUCR 22* 24* 23*   AGAP 6 8 10   CA 8.9 8.6 9.1   PHOS 3.4  --   --      Recent Labs     04/18/21  1529   ALT 55   TP 6.9   ALB 3.5   GLOB 3.4   AGRAT 1.0      CBC w/Diff Recent Labs     04/18/21  1529   WBC 7.2   RBC 4.51   HGB 12.9   HCT 40.0   MCV 88.7   MCH 28.6   MCHC 32.3   RDW 15.3*      GRANS 62   LYMPH 29   EOS 1      Coagulation No results for input(s): PTP, INR, APTT, INREXT, INREXT in the last 72 hours. Iron/Ferritin Lab Results   Component Value Date/Time    Iron 60 06/03/2014 12:00 AM    TIBC 316 06/03/2014 12:00 AM    Iron % saturation 19 06/03/2014 12:00 AM       BNP    Cardiac Enzymes Lab Results   Component Value Date/Time     04/16/2021 05:33 AM    CK - MB 1.4 04/16/2021 05:33 AM    CK-MB Index 1.0 04/16/2021 05:33 AM    Troponin-I, QT 0.03 04/16/2021 05:33 AM        Lactic Acid    Thyroid Studies          All Micro Results     None            Images:    CT (Most Recent).       XRAY (Most Recent) XR Results (most recent):  Results from Hospital Encounter encounter on 04/15/21   XR CHEST PA LAT    Narrative EXAM: XR CHEST PA LAT    INDICATION: cough    COMPARISON: April 15, 2021.    FINDINGS: PA and lateral radiographs of the chest demonstrate persistent patchy  bilateral airspace opacities and likely small bilateral layering pleural  opacities, left greater than right. . Stable enlarged cardiac silhouette and  pulmonary venous congestion. . No acute bone findings. Stable AICD. Aung Saunders Impression No interval change. EKG No results found for this or any previous visit.      2D ECHO

## 2021-04-20 NOTE — PROGRESS NOTES
In Patient Progress note      Admit Date: 4/15/2021    Impression:     #1 ORIN on CKD 3, baseline creatinine appears to be <1  #2 acute on chronic systolic congestive heart failure  #3 nonischemic cardiomyopathy with EF of 15 to 20%  #4 chronic atrial fibrillation  #5 hypertension, controlled  #6 morbid obesity  #8 Mild right renal pelvicocaliectasis as seen on CT     Plan:  #1 lasix 40 mg BID PO   #2 Continue to hold Entresto   #3 allow BP to be ~ 130-140s SBP  #4 low-salt diet  #5 urology recs follow  #6 avoid NSAIDs and other nephrotoxins or IV contrast     Please call with questions     Evelin Cardenas MD FASN  Cell [phone]  Pager: [phone]     Subjective:     - No acute over night events. - respiratory - stable  - hemodynamics - stable, no pressrs  - UOP-good  - Nutrition -ok    Objective:     Visit Vitals  /80 (BP 1 Location: Right upper arm, BP Patient Position: At rest)   Pulse 86   Temp 97.8 °F (36.6 °C)   Resp 20   Ht 5' 6\" (1.676 m)   Wt 93 kg (205 lb)   SpO2 96%   Breastfeeding Unknown   BMI 33.09 kg/m²         Intake/Output Summary (Last 24 hours) at 4/20/2021 1736  Last data filed at 4/20/2021 0451  Gross per 24 hour   Intake 100 ml   Output 650 ml   Net -550 ml       Physical Exam:        Patient is in no apparent distress. HEENT: Head is normocephalic and atraumatic. Neck: no cervical lymphadenopathy or thyromegaly. Lungs: good air entry, clear to auscultation bilaterally. Trachea at the midline. Cardiovascular system: S1, S2, regular rate and rhythm. Abdomen: soft, non tender, non distended. Positive bowel sounds. Extremities: no edema  Integumentary: skin is grossly intact. Neurologic: Alert, oriented time three.       Data Review:    Recent Labs     04/18/21  1529   WBC 7.2   RBC 4.51   HCT 40.0   MCV 88.7   MCH 28.6   MCHC 32.3   RDW 15.3*     Recent Labs     04/20/21  0315 04/19/21  0033 04/18/21  1529   BUN 31* 33* 30*   CREA 1.41* 1.38* 1.32*   CA 8.9 8.6 9.1   ALB --   --  3.5   K 3.3* 4.0 3.7    137 140    105 104   CO2 29 24 26   PHOS 3.4  --   --    GLU 97 145* 127*       Gail Martinez MD

## 2021-04-20 NOTE — ROUTINE PROCESS
Bedside and Verbal shift change report given to Lata La (oncoming nurse) by Jose Maria Robertson (offgoing nurse). Report included the following information SBAR, Kardex and MAR.

## 2021-04-20 NOTE — ROUTINE PROCESS
Bedside and verbal report received from [place] (offgoing nurse). Report included the following information; SBAR, MAR, LABS, Intake/output, Kardex, and summary of care. Patient sitting up in bed with family at bedside. No complaints at this time.

## 2021-04-20 NOTE — PROGRESS NOTES
Cardiology progress note. Patient seen and examined independently. Patient lying flat in bed this morning without any obvious respiratory distress. Will need close follow-up at discharge. Restarting diuretics today. Agree with assessment and plan as noted below. Will sign off and be available. Annette Caldera MD    1. Acute on chronic systolic congestive heart failure- on admission. Now in compensated state. no significant peripheral edema. NT pro BNP >7.000  Chest x-ray 4/17/21  shows pulmonary venous congestion. She was diuresed with Lasix iv then she was given Bumex iv. 2. Non ischemic cardiomyopathy- Recent echo 4/18/21 with EF 15%-20%       S/p catherization 8/8/17: Insignificant coronary artery disease. ·    7/30/17 LVEF 15% Non complaint with Life Vest   ·    12/26/17 LVEH 29%   3. Chronic Atrial fibrillation- [id number] score 4- Patient's  Heart rate is well controlled   4. S/p SJM/[place] D-ICD, AV Pacemaker Dr Lizbeth Robertson 1/2/17  5. Hypertension- stable. 6. Dyslipidemia- on statin at home   7. Poor functional status- mainly bed bound   8. ORIN on CKD- creatinine 0.9 on 3/21 on Epic- renal consulted  9. Bronchitis? ? She reports productive cough and increasing in SOB after first dose of COV-19 vaccine   10. Hx of Bizarre behavior and auditory hallucinations   11. Hypokalemia- K+ 3.3 4/20/21        Echo 4/21     · LV: Estimated LVEF is 15 - 20%. Visually measured ejection fraction. Normal cavity size and wall thickness. Severely reduced systolic function. Wall motion: normal.  · LA: Severely dilated left atrium. Left Atrium volume index is 50 mL/m2. · MV: Mitral valve non-specific thickening. Moderate mitral valve regurgitation is present. · TV: Mild tricuspid valve regurgitation is present. · RV: Mildly reduced systolic function. Pacer/ICD present. · RA: Mildly dilated right atrium. · AV: Mild aortic valve regurgitation is present.   · PA: Pulmonary arterial systolic pressure is 30 mmHg.     Seen  by Dr. Karyle Eddy in 2016. Also was follow by Dr. Radha Bailey . Now she follows with [facility]         Plan:           Would  Resume lasix 40 mg po bid. Discussed with renal  Appreciate renal recommendations. Would continue with Entresto when creatinine improves. Would Continue with anticoagulation with Eliquis  Would continue with asa and statin   Would continue with  Coreg and titrate as BP and HR tolerates   Would continue to replace electrolytes as needed  AICD interrogated 4/19/21 normal function         ASSESSMENT:  Hospital Problems  Date Reviewed: 4/27/2016          Codes Class Noted POA    HTN (hypertension) ICD-10-CM: I10  ICD-9-CM: 401.9  4/15/2021 Unknown        * (Principal) Acute on chronic systolic (congestive) heart failure ([facility] ICD-10-CM: I50.23  ICD-9-CM: 428.23, 428.0  4/15/2021 Unknown        Pacemaker ICD-10-CM: Z95.0  ICD-9-CM: V45.01  4/15/2021 Unknown        Atrial fibrillation ([facility] ICD-10-CM: I48.91  ICD-9-CM: 427.31  Unknown Yes    Overview Addendum 3/20/2015  1:28 PM by Rosaura Jacbos MD     10/11 per Dr Fernanda Cooper note; converted to SR and of Multaq for 3 months already and staying in SR  7/14 recurrent;  3/15 on eliquis  Discussed with patient the risk of bleeding on anticoagulation. Patient understands. Patient also understands the reduced risk of stroke with anticoagulation due to atrial fibrillation. Start coumadin             HLD (hyperlipidemia) ICD-10-CM: E78.5  ICD-9-CM: 272.4  Unknown Unknown    Overview Addendum 4/27/2016  1:07 PM by Rosaura Jacobs MD     3/15 Alpa Mcgrath, taking crestor;  Low density lipoproteins (LDLs) are at goal, triglycerides are at goal, High density lipoproteins (HDLs) are at goal.  6/14 LDLs are at goal, triglycerides are at goal, HDLs are at goal  does not want lipitor anymore                       SUBJECTIVE:  No CP  No SOB  Sleepy   OBJECTIVE:    VS:   Visit Vitals  /89 (BP 1 Location: Right upper arm, BP Patient Position: At rest) Pulse 86   Temp 97.3 °F (36.3 °C)   Resp 20   Ht 5' 6\" (1.676 m)   Wt 93 kg (205 lb)   SpO2 97%   Breastfeeding Unknown   BMI 33.09 kg/m²         Intake/Output Summary (Last 24 hours) at 4/20/2021 1047  Last data filed at 4/20/2021 0451  Gross per 24 hour   Intake    Output 650 ml   Net -650 ml     TELE: AFIB rate controlled     General: alert, well developed, uncooperative and in no apparent distress  HENT: Normocephalic, atraumatic. Normal external eye. Neck :  no bruit, no JVD  Cardiac:  irregularly irregular rhythm, S1, S2 normal, no click, no rub  Lungs: clear to auscultation bilaterally  Abdomen: Soft, nontender, no masses  Extremities:  No c/c/e, peripheral pulses present      Labs: Results:       Chemistry Recent Labs     04/20/21  0315 04/19/21  0033 04/18/21  1529   GLU 97 145* 127*    137 140   K 3.3* 4.0 3.7    105 104   CO2 29 24 26   BUN 31* 33* 30*   CREA 1.41* 1.38* 1.32*   CA 8.9 8.6 9.1   AGAP 6 8 10   BUCR 22* 24* 23*   AP  --   --  91   TP  --   --  6.9   ALB  --   --  3.5   GLOB  --   --  3.4   AGRAT  --   --  1.0      CBC w/Diff Recent Labs     04/18/21  1529   WBC 7.2   RBC 4.51   HGB 12.9   HCT 40.0      GRANS 62   LYMPH 29   EOS 1      Cardiac Enzymes No results for input(s): CPK, CKND1, ELICEO in the last 72 hours. No lab exists for component: CKRMB, TROIP   Coagulation No results for input(s): PTP, INR, APTT, INREXT in the last 72 hours. Lipid Panel Lab Results   Component Value Date/Time    Cholesterol, total 136 03/17/2015 10:25 AM    HDL Cholesterol 49 03/17/2015 10:25 AM    LDL, calculated 68 03/17/2015 10:25 AM    VLDL, calculated 19 03/17/2015 10:25 AM    Triglyceride 95 03/17/2015 10:25 AM    CHOL/HDL Ratio 4.4 01/19/2011 03:34 PM      BNP No results for input(s): BNPP in the last 72 hours.    Liver Enzymes Recent Labs     04/18/21  1529   TP 6.9   ALB 3.5   AP 91      Thyroid Studies Lab Results   Component Value Date/Time    TSH 0.66 04/16/2021 05:33 AM Ashlee OLVERA Mona:[phone]

## 2021-04-20 NOTE — PROGRESS NOTES
Problem: Pressure Injury - Risk of  Goal: *Prevention of pressure injury  Description: Document Yash Scale and appropriate interventions in the flowsheet.   Outcome: Progressing Towards Goal  Note: Pressure Injury Interventions:  Sensory Interventions: Assess changes in LOC, Keep linens dry and wrinkle-free, Maintain/enhance activity level, Minimize linen layers, Pressure redistribution bed/mattress (bed type)    Moisture Interventions: Absorbent underpads, Minimize layers, Apply protective barrier, creams and emollients    Activity Interventions: Pressure redistribution bed/mattress(bed type), Increase time out of bed, PT/OT evaluation    Mobility Interventions: Float heels, HOB 30 degrees or less, Pressure redistribution bed/mattress (bed type), PT/OT evaluation    Nutrition Interventions: Document food/fluid/supplement intake                     Problem: Patient Education: Go to Patient Education Activity  Goal: Patient/Family Education  Outcome: Progressing Towards Goal

## 2021-04-20 NOTE — CONSULTS
ICD-10-CM ICD-9-CM    1. Acute congestive heart failure, unspecified heart failure type (HCC)  I50.9 428.0    2. Acute on chronic systolic (congestive) heart failure (HCC)  I50.23 428.23      428.0    3. Dilated cardiomyopathy (HCC)  I42.0 425.4    4. Longstanding persistent atrial fibrillation (HCC)  I48.11 427.31    5. Essential hypertension  I10 401.9           ASSESSMENT:   1. Acute on chronic systolic congestive heart failure  2. Acute renal insufficiency  3. Hard of hearing      PLAN:    I have reviewed her EL, CT chest, CT Abd/pelvis. I have attempted to interview her but she is severely hard of hearing. No urology intervention needed currently  Hydration per nephrology and cardiology. Follow up arranged? Maria De Jesus Clemente MD    [phone]    April 20, 2021 3:13 PM        Chief Complaint   Patient presents with    Shortness of Breath       HISTORY OF PRESENT ILLNESS:  Lindsey Downing is a 67 y.o. female who is seen in consultation as referred by Dr. Corey Centers  for right hydronephrosis on CT chest. She has been admitted for CHF exacerbation and has received several doses of lasix. She had a CT chest which shows mild right hydronephrosis. EL the day prior showed mild prominence   CT today with decreased hydronephrosis. She denies right flank tenderness. She has a hard time providing a voiding history but sounds as if she may have mild incontinence. No dysuria.       Past Medical History:   Diagnosis Date    Atrial fibrillation ([address]     10/11 per Dr Momo Trujillo note; converted to SR and of [place] for 3 months already and staying in [place] [facility]    Cerebrovascular accident [facility]) 10/2011    left mu; improved almost completely    Essential hypertension     Essential hypertension, benign     Hard of hearing 3/20/2015    Other and unspecified hyperlipidemia     LDLs are at goal, triglycerides are at goal, HDLs are at goal       Past Surgical History:   Procedure Laterality Date    HX HYSTERECTOMY      total       Social History     Tobacco Use    Smoking status: Never Smoker    Smokeless tobacco: Never Used   Substance Use Topics    Alcohol use: No     Comment: occasional, quit     Drug use: No       Allergies   Allergen Reactions    Tetanus Vaccines And Toxoid Swelling    Penicillins Swelling       Family History   Problem Relation Age of Onset    High Cholesterol Sister     Hypertension Other         family history of essential hypertension    Heart Attack Neg Hx     Sudden Death Neg Hx        Current Facility-Administered Medications   Medication Dose Route Frequency Provider Last Rate Last Admin    furosemide (LASIX) tablet 40 mg  40 mg Oral BID Evon Ramsey, NP        pantoprazole (PROTONIX) tablet 40 mg  40 mg Oral ACB&D Renee Hernandez MD   40 mg at 04/20/21 0800    carvediloL (COREG) tablet 12.5 mg  12.5 mg Oral Q12H Evon Ramsey NP   12.5 mg at 04/20/21 0948    albuterol-ipratropium (DUO-NEB) 2.5 MG-0.5 MG/3 ML  3 mL Nebulization Q4H PRN Hill Ly MD        loratadine (CLARITIN) tablet 10 mg  10 mg Oral DAILY Lennox Patton MD   10 mg at 04/20/21 0948    senna-docusate (PERICOLACE) 8.6-50 mg per tablet 2 Tab  2 Tab Oral QHS Lennox Patton MD   Stopped at 04/18/21 2200    melatonin (rapid dissolve) tablet 5 mg  5 mg Oral QHS Lennox Patton MD   5 mg at 04/19/21 2151    azithromycin (ZITHROMAX) tablet 500 mg  500 mg Oral DAILY Lennox Patton MD   500 mg at 04/20/21 0948    LORazepam (ATIVAN) injection 1 mg  1 mg IntraVENous Q6H PRN Lennox Patton MD   1 mg at 04/19/21 0926    fluticasone propionate (FLONASE) 50 mcg/actuation nasal spray 2 Spray  2 Spray Both Nostrils DAILY Lennox Patton MD   2 [place] at 04/20/21 0954    magnesium oxide (MAG-OX) tablet 400 mg  400 mg Oral DAILY Lennox Patton MD   400 mg at 04/20/21 1473    aspirin delayed-release tablet 81 mg  81 mg Oral DAILY Yessenia Triplett MD   81 mg at 04/20/21 0948    atorvastatin (LIPITOR) tablet 20 mg  20 mg Oral DAILY Ladarius Holland MD   20 mg at 04/20/21 3449    apixaban (ELIQUIS) tablet 5 mg  5 mg Oral BID Ladarius Holland MD   5 mg at 04/20/21 8791    [Held by provider] sacubitriL-valsartan (ENTRESTO) 49-51 mg tablet 1 Tab  1 Tab Oral BID Ladarius Holland MD   1 Tab at 04/16/21 1015    sodium chloride (NS) flush 5-40 mL  5-40 mL IntraVENous Q8H Ladarius Holland MD   10 mL at 04/20/21 1449    sodium chloride (NS) flush 5-40 mL  5-40 mL IntraVENous PRN Ladarius Holland MD        acetaminophen (TYLENOL) tablet 650 mg  650 mg Oral Q6H PRN Ladarius Holland MD   650 mg at 04/17/21 2015    Or    acetaminophen (TYLENOL) suppository 650 mg  650 mg Rectal Q6H PRN Ladarius Holland MD        polyethylene glycol [facility]) packet 17 g  17 g Oral DAILY PRN Ladarius Holland MD        [facility] tablet 12.5 mg  12.5 mg Oral Q6H PRN Ladarius Holland MD   12.5 mg at 04/18/21 0222    Or    ondansetron [facility]) injection 4 mg  4 mg IntraVENous Q6H PRN Ladarius Holland MD             Review of Systems:  ROS is:     Not obtainable due to patient factors Hard of hearing      PHYSICAL EXAMINATION:     Visit Vitals  /80 (BP 1 Location: Right upper arm, BP Patient Position: At rest)   Pulse 86   Temp 97.8 °F (36.6 °C)   Resp 20   Ht 5' 6\" (1.676 m)   Wt 205 lb (93 kg)   SpO2 96%   Breastfeeding Unknown   BMI 33.09 kg/m²     Constitutional: Well developed, well-nourished female in no acute distress. HEENT: Normocephalic, Atraumatic   CV:   no edema   Respiratory: No respiratory distress or difficulties breathing   Abdomen:  Soft and nontender.  Female:  CVA tenderness: none  Skin:  Normal color. No evidence of jaundice. Neuro/Psych:  Patient with appropriate affect. Alert and oriented. REVIEW OF LABS AND IMAGING:      CT 4/20/2021  EXAM: CT ABDOMEN AND PELVIS WITHOUT CONTRAST     CLINICAL INDICATION/HISTORY: ?hydronephrosis.  Admitted with fatigue and  shortness of breath.     COMPARISON: CT chest earlier today, retroperitoneal ultrasound 4/19/2021     TECHNIQUE:  CT abdomen and pelvis without IV contrast.  All CT scans at this facility are performed using dose optimization technique as  appropriate to the performed examination, to include automated exposure control,  adjustment of the mA and/or kV according to patient's size (including  appropriate matching for site-specific examinations), or use of an iterative  reconstruction technique.        FINDINGS:   Lower chest: Please see CT chest performed approximately 2 hours prior to this  study for complete evaluation.     Liver: Tiny subcentimeter hypodensity in the inferior right liver partially  visualized on prior chest CT is too small to characterize, but likely a cyst.      Biliary: Unremarkable.      Pancreas: Unremarkable.      Spleen: Unremarkable.      Adrenal glands: Unremarkable.      Kidneys: Right renal pelviectasis. This is decreased compared to the chest CT  performed approximately 2 hours prior. No left hydronephrosis. No renal or  ureteral stones are seen. Multiple calcifications in the pelvis are not  significant changed from 1/20/2020 and are likely phleboliths.      Reproductive organs: Hysterectomy.      Bladder: Unremarkable.      Bowel: Diverticulosis. Submucosal fat deposition in the ascending colon likely  reflects sequela of prior inflammation. No evidence for bowel obstruction or  acute inflammation.      Lymph nodes: No pathologically enlarged lymph nodes.      Vasculature: Extensive burden of calcified atherosclerotic disease. Otherwise,  vasculature is not well evaluated in the absence of intravenous contrast.     Other: No free fluid or free air.     Body wall: Unremarkable.      Bones: No acute osseous abnormality. Scoliosis. Degenerative changes in the  spine. Subtle sclerosis T9 vertebral body, nonspecific.        IMPRESSION  1. Mild right renal pelviectasis, decreased from CT chest performed earlier the  same day.  This was also noted on retroperitoneal ultrasound 4/19/2021. No  obstructing stone or mass is seen.      2. Diverticulosis.       EL- 4/19/2021. EXAM:  US RETROPERITONEUM COMP     INDICATION:  howard     COMPARISON: None.     TECHNIQUE:  Real-time sonography of the kidneys, retroperitoneum and bladder was performed  with multiple static images obtained.     FINDINGS:  Left kidney echogenicity is mildly heterogeneous. . There is mild prominence of  the right renal collecting system. The right kidney measures 11.5 cm and the  left kidney measures 10.7 cm in length.     The urinary bladder demonstrates no calculi or wall thickening. Ureteral jets  not identified.     IMPRESSION     Nonspecific mild prominence of the right renal collecting system. Correlate  clinically.  -No ureteral jets could be identified within the bladder.     Question mild increase of left renal echogenicity. This can be associated with  medical renal disease.     If further evaluation is required consider CT. Labs: Results:   Chemistry    Recent Labs     04/20/21  0315 04/19/21  0033 04/18/21  1529   GLU 97 145* 127*    137 140   K 3.3* 4.0 3.7    105 104   CO2 29 24 26   BUN 31* 33* 30*   CREA 1.41* 1.38* 1.32*   CA 8.9 8.6 9.1   AGAP 6 8 10   BUCR 22* 24* 23*   AP  --   --  91   TP  --   --  6.9   ALB  --   --  3.5   GLOB  --   --  3.4   AGRAT  --   --  1.0      CBC w/Diff Recent Labs     04/18/21  1529   WBC 7.2   RBC 4.51   HGB 12.9   HCT 40.0      GRANS 62   LYMPH 29   EOS 1      Cultures No results for input(s): CULT in the last 72 hours.   All Micro Results     None            Urinalysis Color   Date Value Ref Range Status   04/19/2021 YELLOW   Final     Appearance   Date Value Ref Range Status   04/19/2021 CLEAR   Final     Specific gravity   Date Value Ref Range Status   04/19/2021 1.012 1.005 - 1.030   Final     pH (UA)   Date Value Ref Range Status   04/19/2021 6.0 5.0 - 8.0   Final     Protein   Date Value Ref Range Status   04/19/2021 Negative NEG mg/dL Final     Ketone   Date Value Ref Range Status   04/19/2021 Negative NEG mg/dL Final     Bilirubin   Date Value Ref Range Status   04/19/2021 Negative NEG   Final     Blood   Date Value Ref Range Status   04/19/2021 Negative NEG   Final     Urobilinogen   Date Value Ref Range Status   04/19/2021 0.2 0.2 - 1.0 EU/dL Final     Nitrites   Date Value Ref Range Status   04/19/2021 Negative NEG   Final     Leukocyte Esterase   Date Value Ref Range Status   04/19/2021 TRACE (A) NEG   Final     Potassium   Date Value Ref Range Status   04/20/2021 3.3 (L) 3.5 - 5.5 mmol/L Final     Creatinine   Date Value Ref Range Status   04/20/2021 1.41 (H) 0.6 - 1.3 MG/DL Final     BUN   Date Value Ref Range Status   04/20/2021 31 (H) 7.0 - 18 MG/DL Final      Coagulation Lab Results   Component Value Date/Time    Prothrombin time 11.2 12/17/2014 10:57 AM    Prothrombin time 12.8 (H) 06/03/2014 12:00 AM    Prothrombin time CANCELED 06/03/2014 12:00 AM    INR 1.1 12/17/2014 10:57 AM    INR 1.2 06/03/2014 12:00 AM    INR CANCELED 06/03/2014 12:00 AM

## 2021-04-20 NOTE — PROGRESS NOTES
Discharge/Transition Planning    Left voicemail on all numbers listed to reach son to assure a safe discharge plan. Asked pt what his number is and she gave [phone].        Carline Sandhoff RN BSN  Outcomes Manager    Pager # [phone]

## 2021-04-21 VITALS
SYSTOLIC BLOOD PRESSURE: 121 MMHG | HEART RATE: 90 BPM | BODY MASS INDEX: 32.72 KG/M2 | HEIGHT: 66 IN | RESPIRATION RATE: 20 BRPM | OXYGEN SATURATION: 99 % | WEIGHT: 203.6 LBS | DIASTOLIC BLOOD PRESSURE: 90 MMHG | TEMPERATURE: 97.3 F

## 2021-04-21 LAB
ABDOMINAL PROX AORTA VEL: 45.3 CM/S
ANION GAP SERPL CALC-SCNC: 8 MMOL/L (ref 3–18)
BUN SERPL-MCNC: 32 MG/DL (ref 7–18)
BUN/CREAT SERPL: 21 (ref 12–20)
CALCIUM SERPL-MCNC: 9.3 MG/DL (ref 8.5–10.1)
CHLORIDE SERPL-SCNC: 108 MMOL/L (ref 100–111)
CO2 SERPL-SCNC: 26 MMOL/L (ref 21–32)
CREAT SERPL-MCNC: 1.53 MG/DL (ref 0.6–1.3)
GLUCOSE SERPL-MCNC: 105 MG/DL (ref 74–99)
LEFT KIDNEY LENGTH: 8.12 CM
LEFT KIDNEY WIDTH: 3.73 CM
LEFT RENAL UPPER PARENCHYMA MAX: 13.2 CM/S
LEFT RENAL UPPER PARENCHYMA MIN: 5.3 CM/S
LEFT RENAL UPPER PARENCHYMA RI: 0.6
POTASSIUM SERPL-SCNC: 4 MMOL/L (ref 3.5–5.5)
PROX AORTIC AP: 2.56 CM
PROX AORTIC TRANS: 2.64 CM
RIGHT INTERLOBAR EDV: 5.4 CM/S
RIGHT INTERLOBAR PSV: 18.2 CM/S
RIGHT INTERLOBAR RI: 0.7
RIGHT KIDNEY LENGTH: 7.67 CM
RIGHT KIDNEY WIDTH: 3.08 CM
RIGHT RENAL DIST DIAS: 9.8 CM/S
RIGHT RENAL DIST RAR: 0.9
RIGHT RENAL DIST RI: 0.76
RIGHT RENAL DIST SYS: 40.8 CM/S
RIGHT RENAL LOWER PARENCHYMA MAX: 10.8 CM/S
RIGHT RENAL LOWER PARENCHYMA MIN: 2.2 CM/S
RIGHT RENAL LOWER PARENCHYMA RI: 0.8
RIGHT RENAL MIDDLE PARENCHYMA MAX: 12.5 CM/S
RIGHT RENAL MIDDLE PARENCHYMA MIN: 5.1 CM/S
RIGHT RENAL MIDDLE PARENCHYMA RI: 0.59
RIGHT RENAL RAR: 0.76
RIGHT RENAL UPPER PARENCHYMA MAX: 7.2 CM/S
RIGHT RENAL UPPER PARENCHYMA MIN: 2.6 CM/S
RIGHT RENAL UPPER PARENCHYMA RI: 0.64
SARS-COV-2, COV2: NORMAL
SODIUM SERPL-SCNC: 142 MMOL/L (ref 136–145)

## 2021-04-21 PROCEDURE — 97110 THERAPEUTIC EXERCISES: CPT

## 2021-04-21 PROCEDURE — 99239 HOSP IP/OBS DSCHRG MGMT >30: CPT | Performed by: INTERNAL MEDICINE

## 2021-04-21 PROCEDURE — 2709999900 HC NON-CHARGEABLE SUPPLY

## 2021-04-21 PROCEDURE — 74011250637 HC RX REV CODE- 250/637: Performed by: NURSE PRACTITIONER

## 2021-04-21 PROCEDURE — 99232 SBSQ HOSP IP/OBS MODERATE 35: CPT | Performed by: INTERNAL MEDICINE

## 2021-04-21 PROCEDURE — 36415 COLL VENOUS BLD VENIPUNCTURE: CPT

## 2021-04-21 PROCEDURE — 74011250637 HC RX REV CODE- 250/637: Performed by: INTERNAL MEDICINE

## 2021-04-21 PROCEDURE — 97116 GAIT TRAINING THERAPY: CPT

## 2021-04-21 PROCEDURE — 80048 BASIC METABOLIC PNL TOTAL CA: CPT

## 2021-04-21 PROCEDURE — U0003 INFECTIOUS AGENT DETECTION BY NUCLEIC ACID (DNA OR RNA); SEVERE ACUTE RESPIRATORY SYNDROME CORONAVIRUS 2 (SARS-COV-2) (CORONAVIRUS DISEASE [COVID-19]), AMPLIFIED PROBE TECHNIQUE, MAKING USE OF HIGH THROUGHPUT TECHNOLOGIES AS DESCRIBED BY CMS-2020-01-R: HCPCS

## 2021-04-21 PROCEDURE — 74011250637 HC RX REV CODE- 250/637: Performed by: HOSPITALIST

## 2021-04-21 RX ORDER — HYDRALAZINE HYDROCHLORIDE 50 MG/1
50 TABLET, FILM COATED ORAL 3 TIMES DAILY
Qty: 90 TAB | Refills: 1 | Status: SHIPPED | OUTPATIENT
Start: 2021-04-21

## 2021-04-21 RX ORDER — CARVEDILOL 12.5 MG/1
12.5 TABLET ORAL EVERY 12 HOURS
Qty: 60 TAB | Refills: 3 | Status: SHIPPED | OUTPATIENT
Start: 2021-04-21

## 2021-04-21 RX ORDER — ALBUTEROL SULFATE 90 UG/1
2 AEROSOL, METERED RESPIRATORY (INHALATION)
Qty: 1 INHALER | Refills: 2 | Status: SHIPPED | OUTPATIENT
Start: 2021-04-21

## 2021-04-21 RX ORDER — FLUTICASONE PROPIONATE 50 MCG
2 SPRAY, SUSPENSION (ML) NASAL DAILY
Qty: 1 BOTTLE | Refills: 0 | Status: SHIPPED | OUTPATIENT
Start: 2021-04-21

## 2021-04-21 RX ORDER — LORATADINE 10 MG/1
10 TABLET ORAL DAILY
Qty: 30 TAB | Refills: 0 | Status: SHIPPED | OUTPATIENT
Start: 2021-04-21

## 2021-04-21 RX ORDER — FUROSEMIDE 40 MG/1
40 TABLET ORAL 3 TIMES DAILY
Qty: 30 TAB | Refills: 0 | Status: SHIPPED | OUTPATIENT
Start: 2021-04-21

## 2021-04-21 RX ORDER — PANTOPRAZOLE SODIUM 40 MG/1
40 TABLET, DELAYED RELEASE ORAL DAILY
Qty: 30 TAB | Refills: 1 | Status: SHIPPED | OUTPATIENT
Start: 2021-04-21

## 2021-04-21 RX ORDER — QUETIAPINE FUMARATE 25 MG/1
25 TABLET, FILM COATED ORAL
Qty: 30 TAB | Refills: 2 | Status: SHIPPED | OUTPATIENT
Start: 2021-04-21

## 2021-04-21 RX ORDER — FUROSEMIDE 40 MG/1
40 TABLET ORAL 2 TIMES DAILY
Status: DISCONTINUED | OUTPATIENT
Start: 2021-04-22 | End: 2021-04-21 | Stop reason: HOSPADM

## 2021-04-21 RX ORDER — QUETIAPINE FUMARATE 25 MG/1
25 TABLET, FILM COATED ORAL
Status: DISCONTINUED | OUTPATIENT
Start: 2021-04-21 | End: 2021-04-21 | Stop reason: HOSPADM

## 2021-04-21 RX ADMIN — Medication 81 MG: at 09:46

## 2021-04-21 RX ADMIN — CARVEDILOL 12.5 MG: 12.5 TABLET, FILM COATED ORAL at 09:45

## 2021-04-21 RX ADMIN — APIXABAN 5 MG: 5 TABLET, FILM COATED ORAL at 09:45

## 2021-04-21 RX ADMIN — MAGNESIUM GLUCONATE 500 MG ORAL TABLET 400 MG: 500 TABLET ORAL at 09:45

## 2021-04-21 RX ADMIN — PANTOPRAZOLE SODIUM 40 MG: 40 TABLET, DELAYED RELEASE ORAL at 17:00

## 2021-04-21 RX ADMIN — Medication 10 ML: at 14:00

## 2021-04-21 RX ADMIN — Medication 10 ML: at 06:49

## 2021-04-21 RX ADMIN — AZITHROMYCIN MONOHYDRATE 500 MG: 250 TABLET ORAL at 09:45

## 2021-04-21 RX ADMIN — LORATADINE 10 MG: 10 TABLET ORAL at 09:46

## 2021-04-21 RX ADMIN — APIXABAN 5 MG: 5 TABLET, FILM COATED ORAL at 17:29

## 2021-04-21 RX ADMIN — PANTOPRAZOLE SODIUM 40 MG: 40 TABLET, DELAYED RELEASE ORAL at 08:00

## 2021-04-21 RX ADMIN — ATORVASTATIN CALCIUM 20 MG: 20 TABLET, FILM COATED ORAL at 09:46

## 2021-04-21 RX ADMIN — FLUTICASONE PROPIONATE 2 SPRAY: 50 SPRAY, METERED NASAL at 09:56

## 2021-04-21 NOTE — PROGRESS NOTES
Urology Progress Note        Assessment/Plan:     Patient Active Problem List   Diagnosis Code    Atrial fibrillation (HCC) I48.91    Essential hypertension, benign I10    HLD (hyperlipidemia) E78.5    Tinnitus of both ears H93.13    Hypomagnesemia E83.42    Dizziness and giddiness R42    CVA, old, hemiparesis ([address] I69.359    Aortic ectasia (HCC) I77.819    Mitral valve insufficiency and aortic valve insufficiency I08.0    Obesity (BMI 30.0-34. 9) E66.9    Hard of hearing H91.90    HTN (hypertension) I10    Acute on chronic systolic (congestive) heart failure (HCC) I50.23    Pacemaker Z95.0         ASSESSMENT:   1. Acute on chronic systolic congestive heart failure  2. Acute renal insufficiency on CKD 3   Creat: 1.53>1.41 (Baseline 1.0)  3. Hard of hearing        PLAN:    Appreciate overall management per medicine. Nephrology following, recommend Lasix  Please obtain PVR and document in progress note. No urology intervention needed at this time. Will continue to follow kidney function. Will sign off once PVR resulted.     Follow up arranged? No        Darren Gutierrez PA-C  [facility]  Available MRenzo Blake  Pager: [phone]    Addendum:  Patient seen and evaluated independently. I have reviewed and agree with above assessment and plan. CT imaging reviewed. Right mild pelviectasis not of any clinical significance. No obstructing stones. No need for  intervention. Jennifer Samano MD, FACS  2021      Subjective:     Daily Progress Note: 2021 12:05 PM    Wallace Dolan is doing good. NC in place. She has no complaints.     Objective:     Visit Vitals  BP (!) 121/90 (BP 1 Location: Right upper arm, BP Patient Position: At rest) Comment: Reported to [employer]   Pulse 90   Temp 97.3 °F (36.3 °C)   Resp 20   Ht 5' 6\" (1.676 m)   Wt 92.4 kg (203 lb 9.6 oz)   SpO2 99%   Breastfeeding Unknown   BMI 32.86 kg/m²        Temp (24hrs), Av.5 °F (36.4 °C), Min:96.9 °F (36.1 °C), Max:97.8 °F (36.6 °C)      Intake and Output:  04/19 1901 - 04/21 0700  In: 100 [P.O.:100]  Out: 650 [Urine:650]  No intake/output data recorded. PHYSICAL EXAMINATION:   Visit Vitals  BP (!) 121/90 (BP 1 Location: Right upper arm, BP Patient Position: At rest) Comment: Reported to RN Dina   Pulse 90   Temp 97.3 °F (36.3 °C)   Resp 20   Ht 5' 6\" (1.676 m)   Wt 92.4 kg (203 lb 9.6 oz)   SpO2 99%   Breastfeeding Unknown   BMI 32.86 kg/m²     Constitutional: Well developed, well-nourished female in no acute distress. HEENT: Normocephalic, Atraumatic   CV:   no edema   Respiratory: NC in place. Has cough. Abdomen:  Soft and nontender.  Female:  CVA tenderness: none  Skin:  Normal color. No evidence of jaundice. Neuro/Psych:  Patient with appropriate affect. Alert and oriented. Lab/Data Review: All lab results for the last 24 hours reviewed. Labs:     Labs: Results:   Chemistry    Recent Labs     04/21/21  0334 04/20/21  0315 04/19/21  0033 04/18/21  1529   * 97 145* 127*    141 137 140   K 4.0 3.3* 4.0 3.7    106 105 104   CO2 26 29 24 26   BUN 32* 31* 33* 30*   CREA 1.53* 1.41* 1.38* 1.32*   CA 9.3 8.9 8.6 9.1   AGAP 8 6 8 10   BUCR 21* 22* 24* 23*   AP  --   --   --  91   TP  --   --   --  6.9   ALB  --   --   --  3.5   GLOB  --   --   --  3.4   AGRAT  --   --   --  1.0      CBC w/Diff Recent Labs     04/18/21  1529   WBC 7.2   RBC 4.51   HGB 12.9   HCT 40.0      GRANS 62   LYMPH 29   EOS 1      Cultures No results for input(s): CULT in the last 72 hours.   All Micro Results     None            Urinalysis Color   Date Value Ref Range Status   04/19/2021 YELLOW   Final     Appearance   Date Value Ref Range Status   04/19/2021 CLEAR   Final     Specific gravity   Date Value Ref Range Status   04/19/2021 1.012 1.005 - 1.030   Final     pH (UA)   Date Value Ref Range Status   04/19/2021 6.0 5.0 - 8.0   Final     Protein   Date Value Ref Range Status   04/19/2021 Negative NEG mg/dL Final     Ketone   Date Value Ref Range Status   04/19/2021 Negative NEG mg/dL Final     Bilirubin   Date Value Ref Range Status   04/19/2021 Negative NEG   Final     Blood   Date Value Ref Range Status   04/19/2021 Negative NEG   Final     Urobilinogen   Date Value Ref Range Status   04/19/2021 0.2 0.2 - 1.0 EU/dL Final     Nitrites   Date Value Ref Range Status   04/19/2021 Negative NEG   Final     Leukocyte Esterase   Date Value Ref Range Status   04/19/2021 TRACE (A) NEG   Final     Potassium   Date Value Ref Range Status   04/21/2021 4.0 3.5 - 5.5 mmol/L Final     Creatinine   Date Value Ref Range Status   04/21/2021 1.53 (H) 0.6 - 1.3 MG/DL Final     BUN   Date Value Ref Range Status   04/21/2021 32 (H) 7.0 - 18 MG/DL Final      PSA No results for input(s): PSA in the last 72 hours.    Coagulation Lab Results   Component Value Date/Time    Prothrombin time 11.2 12/17/2014 10:57 AM    Prothrombin time 12.8 (H) 06/03/2014 12:00 AM    Prothrombin time CANCELED 06/03/2014 12:00 AM    INR 1.1 12/17/2014 10:57 AM    INR 1.2 06/03/2014 12:00 AM    INR CANCELED 06/03/2014 12:00 AM

## 2021-04-21 NOTE — ROUTINE PROCESS
Bedside and Verbal shift change report given to Loco Yoo RN (oncoming nurse) by Allison Schreiber RN (offgoing nurse). Report included the following information SBAR, Kardex, Intake/Output, MAR and Recent Results. Patient alert and resting in bed eating breakfast. Call light and phone in reach.

## 2021-04-21 NOTE — PROGRESS NOTES
Discharge order noted for today. Pt has been accepted to [address] [place] [address]. Met with patient and family and are agreeable to the transition plan today. Transport has been arranged through granddaughter. Patient's discharge summary and home health  orders have been forwarded to [address] [place] [address] via [facility] and called Abimael Barron with [facility]. Updated bedside RN, Shira Flores,  to the transition plan. Discharge information has been documented on the AVS.     Medicare pt has received, reviewed, and signed 2nd IM letter informing them of their right to appeal the discharge. Signed copy has been placed on pt bedside chart.

## 2021-04-21 NOTE — PROGRESS NOTES
Discharge/Transition Planning    Had extensive conversation with patient, son, and granddaughter , Siva Medina ([phone]) in room. Pt used vulgar language to family and absolutely refuses to go to facility and give up apartment , check, and dog. Family states this is normal behavior and she uses threats and manipulation to get what she wants. Granddaughter has tried to help patient with pill reminders and is there on weekends and she states patient gets personal care and son to get her fast food and she will not take medicine whether in facility or home. Pt states her hospitalization is from COVID vaccine and the chronic conditions are all from vaccine. She previously returned life vest and CPAP as non-compliant as well. Family is fine with her going home with home health as she will leave a facility. Sent [facility] to Dr Noreen Barron to notify and ask for home health   Called Colten Canales and notified. [facility][1] and notified canceling. Called Hill Aquino with [facility] and notified . Loni will come get her at [address] [facility] . Loni works for [employer] and is reaching out to pt medicaid  and getting pt personal care aid in home again.  Loni is already with pt on weekend but will figure out how to have more family presence in home as well

## 2021-04-21 NOTE — DISCHARGE INSTRUCTIONS
DISCHARGE SUMMARY from Nurse    PATIENT INSTRUCTIONS:    After general anesthesia or intravenous sedation, for 24 hours or while taking prescription Narcotics:  · Limit your activities  · Do not drive and operate hazardous machinery  · Do not make important personal or business decisions  · Do  not drink alcoholic beverages  · If you have not urinated within 8 hours after discharge, please contact your surgeon on call. Report the following to your surgeon:  · Excessive pain, swelling, redness or odor of or around the surgical area  · Temperature over 100.5  · Nausea and vomiting lasting longer than 4 hours or if unable to take medications  · Any signs of decreased circulation or nerve impairment to extremity: change in color, persistent  numbness, tingling, coldness or increase pain  · Any questions    What to do at Home:  Recommended activity: Activity as tolerated, Ambulate in house and PT/OT per Home Health,     please follow up with primary care     *  Please give a list of your current medications to your Primary Care Provider. *  Please update this list whenever your medications are discontinued, doses are      changed, or new medications (including over-the-counter products) are added. *  Please carry medication information at all times in case of emergency situations. These are general instructions for a healthy lifestyle:    No smoking/ No tobacco products/ Avoid exposure to second hand smoke  Surgeon General's Warning:  Quitting smoking now greatly reduces serious risk to your health.     Obesity, smoking, and sedentary lifestyle greatly increases your risk for illness    A healthy diet, regular physical exercise & weight monitoring are important for maintaining a healthy lifestyle    You may be retaining fluid if you have a history of heart failure or if you experience any of the following symptoms:  Weight gain of 3 pounds or more overnight or 5 pounds in a week, increased swelling in our hands or feet or shortness of breath while lying flat in bed. Please call your doctor as soon as you notice any of these symptoms; do not wait until your next office visit. The discharge information has been reviewed with the patient and caregiver. The patient and caregiver verbalized understanding. Discharge medications reviewed with the patient and caregiver and appropriate educational materials and side effects teaching were provided. ___________________________________________________________________________________________________________________________________  Discharge Instructions    Patient: Chito Oakley MRN: [id number]  CSN: [id number]    YOB: 1945  Age: 67 y.o. Sex: female    DOA: 4/15/2021 LOS:  LOS: 6 days   Discharge Date:      ACUTE DIAGNOSES:  1. Acute on chronic systolic CHF  2. Non-ischemic cardiomyopathy with current AICD, interrogated with normal finding  3. Persistent AFIB, on Eliquis   4. Indeterminate troponin elevation, likely demand ischemia, resolved  5. Hypertension   6. Hyperlipidemia  7. Morbid obesity   8. Hypokalemia    9. Right renal hydronephrosis, no obstruction on CT abd/pelv  10. Acute renal insufficiency from over diuresis     11. Allergic rhinitis? Acute bronchitis? 12.  Confusion, bizarre behavior previously reported outpatient, started on Seroquel        DISCHARGE MEDICATIONS:     NOTE: your home medications have been changed, please take them as prescribed. · It is important that you take the medication exactly as they are prescribed. · Keep your medication in the bottles provided by the pharmacist and keep a list of the medication names, dosages, and times to be taken in your wallet. · Do not take other medications without consulting your doctor.        DIET:  Cardiac Diet, Diabetic Diet and Low fat, Low cholesterol    ACTIVITY: Activity as tolerated, fall precaution,   Please continue to participate with physical therapy    ADDITIONAL INFORMATION: If you experience any of the following symptoms then please call your primary care physician or return to the emergency room if you cannot get hold of your doctor: Fever, chills, nausea, vomiting, diarrhea, change in mentation, falling, bleeding, shortness of breath. FOLLOW UP CARE:  Reggie Harvey PCP, you are to call and set up an appointment to see them in 3-4 days. Follow-up with Dr. Tyrese Santiago, Nephrology in 2-3 weeks   Follow up with Reggie Harvey cardiology in 1-2 weeks         Information obtained by :  I understand that if any problems occur once I am at home I am to contact my physician. I understand and acknowledge receipt of the instructions indicated above.                                                                                                                                            Physician's or R.N.'s Signature                                                                  Date/Time                                                                                                                                              Patient or Representative Signature                                                          Date/Time    Sang La MD  4/21/2021  1:47 PM  Patient armband removed and shredded

## 2021-04-21 NOTE — DISCHARGE SUMMARY
Discharge Summary    Patient: Darren Valdivia               Sex: female          DOA: 4/15/2021         YOB: 1945      Age:  67 y.o.        LOS:  LOS: 6 days                Admit Date: 4/15/2021    Discharge Date: 4/21/2021    Primary care physician: Luis F, MD Bobby    Discharge Diagnoses:    1. Acute on chronic systolic CHF  2. Non-ischemic cardiomyopathy with present of ICD         ICD interrogated with normal finding. She has ICD at this compatible with MRI   3. Persistent AFIB, on Eliquis   4. Indeterminate troponin elevation, likely demand ischemia due to CHF   5. Hypertension   6. Hyperlipidemia  7. Morbid obesity   8. Hypokalemia    9. Hard of hearing   10. Acute renal insufficiency from over diuresis         Right renal hydronephrosis, unclear if there is obstructive pathology  11. Allergic rhinitis, possible Acute bronchitis  12. Confusion, bizarre behavior previously reported outpatient, ?hallucination?          -CT head without acute pathology    Discharge Condition: Good  Disposition: home with home health   Code Status: full code     Follow up for Primary Care Physician:  1) please note that he Cardiac Medications have been changed, her metoprolol has been switched to carvedilol, her lasix has been decreased, her [place] has been stopped due to her renal insufficiency. Please follow up and monitor her Renal function. She needs further follow up with Dr Romeo Garcia, her nephrologist for further care. 2) she needs further follow up with her cardiology group for further care  3)  Note she has been started on Seroquel for agitation, please adjust medications as clinically indicated    4)  She needs outpatient follow up with urology as needed     Hospital Course:   67 y.o female with HTN, nonischemic cardiomyopathy, ICD, persistent afib on Eliquis, h/o CVA with left-sided hemiparesis, presented from home with feeling of fatigue and shortness of breath.  She was recently with COVID-19 vaccination. No fever. In the ER, she was found to have vascular congestion with elevated proBNP and troponin. She was started on diuresis with high dose lasix and then BID IV bumex. [facility] record indicated that she has underlying non-ischemic cardiomyopathy with ICD placed in 1/2/2018. She has been on Eliquis for persistent afib. Negative cardiac enzyme  She tolerated diuretic well and improved in her breathing but still feels tire with exertion. She was not hypoxic. She has slight increase in her creatinine, hence her diuretic and Entresto were held. Nephrology consulted. US renal without clear pathology but there was concern for right sided hydronephrosis. Urology consulted but no further recommendation as CT abd/pelv without obstructive pathology. Nephrology consider her elevated renal creatinine is related to combination of diuresis, CHF, and possible urinary reflux. Both Cardiology and Nephrolgoy recommended to continue with low dose diuresis. Her [place] was held. She needs close monitor of her renal function while on her diuresis     She initially has respiratory complaint, cough, sneezing, this was thought to be allergic rhinitis. She was treated symptomatically. She seemed to show significant symptom when she interacted with staffs but she was normal when she was by herself. CT chest was negative for infiltrated. She was given azithromycin and bronchodilator for suspected acute bronchitis. She tolerated well. She has episodes of agitation which was reported at home. She continue to have bizarre behavior which PCP recommended CT head due to concern of her chronic anticoagulation use. CT head was negative for evidence of bleed. She has been started on seroquel     Last 24 Hours: she has no overnight event. She has no need for oxygen supplement. Her sneezing improved. Her creatinine still increase but nephrology ok for further outpatient monitoring. Her son was at bedside with clinical updates.  He expressed that he might not be able to take care of her. LTC was recommended but her family and her changed their mind and agreed to discharge home with home health. Family will acquire nursing aide for her care at home    ROS No current fever/chills, no headache, no dizziness, no facial pain, no sinus congestion,   No swallowing pain, No chest pain, no palpitation, no shortness of breath, no abd pain,  No diarrhea, no urinary complaint, no leg pain or swelling    VS:   Visit Vitals  BP (!) 121/90 (BP 1 Location: Right upper arm, BP Patient Position: At rest) Comment: Reported to [employer]   Pulse 90   Temp 97.3 °F (36.3 °C)   Resp 20   Ht 5' 6\" (1.676 m)   Wt 92.4 kg (203 lb 9.6 oz)   SpO2 99%   Breastfeeding Unknown   BMI 32.86 kg/m²      Tmax/24hrs: Temp (24hrs), Av.5 °F (36.4 °C), Min:96.9 °F (36.1 °C), Max:97.8 °F (36.6 °C)      Intake/Output Summary (Last 24 hours) at 2021 1353  Last data filed at 2021 2248  Gross per 24 hour   Intake 0 ml   Output    Net 0 ml       Tele: afib  General:  Cooperative, Not in acute distress, speaks in full sentence while in bed  HEENT: PERRL, EOMI, supple neck, no JVD, dry oral mucosa  Cardiovascular: S1S2 irregular, no rub/gallop   Pulmonary: Clear air entry bilaterally, no wheezing, + crackle  GI:  Soft, non tender, non distended, +bs, no guarding   Extremities:  No pedal edema, +distal pulses appreciated   Neuro: AOx3, moving all extremities, no gross deficit. Consults:   Nephrology: Dr. Bryson Meyers  Cardiology: Dr. Lin Dudley   Urology: Dr. Jhoana Manriquez Diagnostic Studies:   CT Results (most recent):  Results from Hospital Encounter encounter on 04/15/21   CT ABD PELV WO CONT    Narrative EXAM: CT ABDOMEN AND PELVIS WITHOUT CONTRAST     CLINICAL INDICATION/HISTORY: ?hydronephrosis. Admitted with fatigue and  shortness of breath.     COMPARISON: CT chest earlier today, retroperitoneal ultrasound 2021    TECHNIQUE:  CT abdomen and pelvis without IV contrast.  All CT scans at this facility are performed using dose optimization technique as  appropriate to the performed examination, to include automated exposure control,  adjustment of the mA and/or kV according to patient's size (including  appropriate matching for site-specific examinations), or use of an iterative  reconstruction technique. FINDINGS:   Lower chest: Please see CT chest performed approximately 2 hours prior to this  study for complete evaluation. Liver: Tiny subcentimeter hypodensity in the inferior right liver partially  visualized on prior chest CT is too small to characterize, but likely a cyst.     Biliary: Unremarkable. Pancreas: Unremarkable. Spleen: Unremarkable. Adrenal glands: Unremarkable. Kidneys: Right renal pelviectasis. This is decreased compared to the chest CT  performed approximately 2 hours prior. No left hydronephrosis. No renal or  ureteral stones are seen. Multiple calcifications in the pelvis are not  significant changed from 1/20/2020 and are likely phleboliths. Reproductive organs: Hysterectomy. Bladder: Unremarkable. Bowel: Diverticulosis. Submucosal fat deposition in the ascending colon likely  reflects sequela of prior inflammation. No evidence for bowel obstruction or  acute inflammation. Lymph nodes: No pathologically enlarged lymph nodes. Vasculature: Extensive burden of calcified atherosclerotic disease. Otherwise,  vasculature is not well evaluated in the absence of intravenous contrast.    Other: No free fluid or free air. Body wall: Unremarkable. Bones: No acute osseous abnormality. Scoliosis. Degenerative changes in the  spine. Subtle sclerosis T9 vertebral body, nonspecific. Impression 1. Mild right renal pelviectasis, decreased from CT chest performed earlier the  same day. This was also noted on retroperitoneal ultrasound 4/19/2021. No  obstructing stone or mass is seen.      2. Diverticulosis. Thank you for enabling us to participate in the care of this patient. CT Chest:  FINDINGS:   Lung/Airways:  Trace right pleural effusion. Very minimal left pleural effusion. Mild interlobular septal thickening. Mild perihilar groundglass densities. No  dense consolidation.      Base of Neck:  Unremarkable.      Mediastinum: 1.1 cm right paratracheal node, likely reactive. Main pulmonary  artery appears borderline enlarged.      Heart: Cardiomegaly. Moderate coronary artery calcifications. Left chest wall  dual lead cardiac conduction device.      Vasculature: Moderate burden of calcified atherosclerotic disease. Otherwise,  the vasculature is not well evaluated in the absence of intravenous contrast.     Upper Abdomen: Partially visualized 9 mm hypodensity in the inferior right liver  is too small to characterize. Mild right hydronephrosis.      Soft tissues: 6.9 x 3.2 x 8.3 cm lipoma right upper back.      Bones: Mild height loss and sclerosis T9 vertebral body. Degenerative changes in  the spine. No definite acute osseous abnormality.        IMPRESSION  1. Mild bilateral pulmonary edema with trace effusions.     2. Cardiomegaly.     3. Mild right hydronephrosis, incompletely evaluated.     4. Mild height loss and sclerosis T9 vertebral body, age-indeterminate.     5. Moderate atherosclerosis, including the coronary arteries. CT head:  FINDINGS:           Soft tissues: No significant findings.     Skull base/calvarium: Unremarkable.     Brain: There is mild to moderate generalized atrophy     There is a moderate amount of deep white matter lucency throughout both right  and left centrum semiovale and paraventricular regions. These changes are stable     No acute infarct or bleed is seen     White matter changes may obscure an additional new nonhemorrhagic infarction  more be useful to exclude this           Ventricles and cisterns: Reflect atrophy     Orbits: Unremarkable.    Paranasal Sinuses: Clear      Other findings: None     IMPRESSION  :     Atrophy and extensive deep white matter changes however no acute interval change  from January 2010 noted  May wish to consider MRI follow-up       04/15/21   ECHO ADULT COMPLETE 04/16/2021 4/16/2021    Narrative · LV: Estimated LVEF is 15 - 20%. Visually measured ejection fraction. Normal cavity size and wall thickness. Severely reduced systolic function. Wall motion: normal.  · LA: Severely dilated left atrium. Left Atrium volume index is 50 mL/m2. · MV: Mitral valve non-specific thickening. Moderate mitral valve   regurgitation is present. · TV: Mild tricuspid valve regurgitation is present. · RV: Mildly reduced systolic function. Pacer/ICD present. · RA: Mildly dilated right atrium. · AV: Mild aortic valve regurgitation is present. · PA: Pulmonary arterial systolic pressure is 30 mmHg. Signed by: Silvina Worthington MD       US Results (most recent):  Results from [place] encounter on 04/15/21   US RETROPERITONEUM COMP    Narrative EXAM:  US RETROPERITONEUM COMP    INDICATION:  howard    COMPARISON: None. TECHNIQUE:  Real-time sonography of the kidneys, retroperitoneum and bladder was performed  with multiple static images obtained. FINDINGS:  Left kidney echogenicity is mildly heterogeneous. . There is mild prominence of  the right renal collecting system. The right kidney measures 11.5 cm and the  left kidney measures 10.7 cm in length. The urinary bladder demonstrates no calculi or wall thickening. Ureteral jets  not identified. Impression Nonspecific mild prominence of the right renal collecting system. Correlate  clinically.  -No ureteral jets could be identified within the bladder. Question mild increase of left renal echogenicity. This can be associated with  medical renal disease. If further evaluation is required consider CT.                        Lab/Data Review:  Labs: Results: Chemistry Recent Labs     04/21/21  0334 04/20/21  0315 04/19/21  0033 04/18/21  1529   * 97 145* 127*    141 137 140   K 4.0 3.3* 4.0 3.7    106 105 104   CO2 26 29 24 26   BUN 32* 31* 33* 30*   CREA 1.53* 1.41* 1.38* 1.32*   CA 9.3 8.9 8.6 9.1   AGAP 8 6 8 10   BUCR 21* 22* 24* 23*   AP  --   --   --  91   TP  --   --   --  6.9   ALB  --   --   --  3.5   GLOB  --   --   --  3.4   AGRAT  --   --   --  1.0      CBC w/Diff Recent Labs     04/18/21  1529   WBC 7.2   RBC 4.51   HGB 12.9   HCT 40.0      GRANS 62   LYMPH 29   EOS 1      Coagulation No results for input(s): PTP, INR, APTT, INREXT in the last 72 hours. Iron/Ferritin No results for input(s): IRON in the last 72 hours. No lab exists for component: TIBCCALC   BNP No results for input(s): BNPP in the last 72 hours. Cardiac Enzymes No results for input(s): CPK, CKND1, ELICEO in the last 72 hours. No lab exists for component: CKRMB, TROIP   Liver Enzymes Recent Labs     04/18/21  1529   TP 6.9   ALB 3.5   AP 91      Thyroid Studies No results for input(s): T4, T3U, TSH, TSHEXT in the last 72 hours. No lab exists for component: T3RU       All Micro Results     None                Medications at discharge  including reasons for change and indications for new ones:   Current Discharge Medication List      START taking these medications    Details   pantoprazole (PROTONIX) 40 mg tablet Take 1 Tab by mouth daily. Indications: gastroesophageal reflux disease  Qty: 30 Tab, Refills: 1      loratadine (CLARITIN) 10 mg tablet Take 1 Tab by mouth daily. Indications: inflammation of the nose due to an allergy, sneezing  Qty: 30 Tab, Refills: 0      fluticasone propionate (FLONASE) 50 mcg/actuation nasal spray 2 Sprays by Both Nostrils route daily. Indications: inflammation of the nose due to an allergy  Qty: 1 Bottle, Refills: 0      carvediloL (COREG) 12.5 mg tablet Take 1 Tab by mouth every twelve (12) hours.  Indications: ventricular rate control in atrial fibrillation  Qty: 60 Tab, Refills: 3      albuterol (Ventolin HFA) 90 mcg/actuation inhaler Take 2 Puffs by inhalation every four (4) hours as needed for Wheezing. Indications: bronchospasm prevention  Qty: 1 Inhaler, Refills: 2      QUEtiapine (SEROquel) 25 mg tablet Take 1 Tab by mouth nightly. Qty: 30 Tab, Refills: 2         CONTINUE these medications which have CHANGED    Details   furosemide (LASIX) 40 mg tablet Take 1 Tab by mouth three (3) times daily. Indications: fluid in the lungs due to chronic heart failure  Qty: 30 Tab, Refills: 0      hydrALAZINE (APRESOLINE) 50 mg tablet Take 1 Tab by mouth three (3) times daily. Indications: high blood pressure  Qty: 90 Tab, Refills: 1         CONTINUE these medications which have NOT CHANGED    Details   isosorbide mononitrate ER (IMDUR) 30 mg tablet Take 30 mg by mouth every morning. cholecalciferol (Vitamin D3) 25 mcg (1,000 unit) cap Take 1,000 Units by mouth daily. acetaminophen (Tylenol Extra Strength) 500 mg tablet Take 500 mg by mouth every six (6) hours as needed for Pain.      magnesium oxide (MAG-OX) 400 mg tablet Take 400 mg by mouth daily. amLODIPine (NORVASC) 10 mg tablet Take 10 mg by mouth daily. Eliquis 5 mg tablet Take 5 mg by mouth two (2) times a day. aspirin delayed-release (ASPIRIN LOW DOSE) 81 mg tablet Take 1 Tab by mouth daily. Qty: 100 Tab, Refills: 2      atorvastatin (LIPITOR) 20 mg tablet Take 20 mg by mouth daily.          STOP taking these medications       spironolactone (ALDACTONE) 25 mg tablet Comments:   Reason for Stopping:         Entresto 49-51 mg tab tablet Comments:   Reason for Stopping:         metoprolol tartrate (LOPRESSOR) 100 mg IR tablet Comments:   Reason for Stopping:                       Pending laboratory work and tests: none    Activity: Activity as tolerated, fall precaution     Diet: Cardiac Diet, Diabetic Diet, Low fat, Low cholesterol and Renal Diet    Wound Care: None needed        Clinton Velasco MD  4/21/2021  1:53 PM

## 2021-04-21 NOTE — PROGRESS NOTES
Discharge/Transition Planning    1015:Spoke with son in room with Dr Quoc Connolly. Pt seems to have some sort of mental health concern and also there is concern about managing health at home even with personal care aid. Pt lives alone. Recommend long term care. Son states he can not provide and care for pt the way she needs and is in agreement with LTC. He was made aware pt check goes to facility after 30 days. He would like [facility] facility if possible. Sent referrals out  1055: Kirti Ko with [facility] and they have long term care beds.  She will review referral    Harley Zambrano RN BSN  Outcomes Manager    Pager # [phone]

## 2021-04-21 NOTE — PROGRESS NOTES
Problem: Mobility Impaired (Adult and Pediatric)  Goal: *Acute Goals and Plan of Care (Insert Text)  Description: Physical Therapy Goals  Initiated 4/16/2021 and to be accomplished within 7 day(s)  1. Patient will move from supine to sit and sit to supine , scoot up and down, and roll side to side in bed with modified independence. 2.  Patient will transfer from bed to chair and chair to bed with modified independence using the least restrictive device. 3.  Patient will perform sit to stand with modified independence. 4.  Patient will ambulate with supervision/set-up for 150 feet with the least restrictive device. 5.  Patient will ascend/descend 6 stairs with unilateral handrail(s) with minimal assistance/contact guard assist.       PLOF: Pt extremely hard of hearing, reporting 6 MILLI home in 1 story house, has RW/cane mobility, son and his family. Outcome: Progressing Towards Goal   PHYSICAL THERAPY TREATMENT    Patient: Valerie Grant (67 y.o. female)  Date: 4/21/2021  Diagnosis: Acute on chronic systolic (congestive) heart failure (HCC) [I50.23]  HTN (hypertension) [I10]  HLD (hyperlipidemia) [E78.5] Acute on chronic systolic (congestive) heart failure (HCC)       Precautions: Fall  PLOF: se above     ASSESSMENT:  Pt cleared to participate in PT session, pt received semi-reclined in bed and agreeable to therapy session, granddaughter and family member present throughout. Pt asleep but easily aroused, on 3L O2, per RN does not need O2. Pt completing supine to sit with CGA-Roe. Sitting on EOB with good sitting balance, able to complete LE exercise as outlined below. Pt then standing with supervision and ambulating x100 feet with supervision and RW. Pt on RA, at end of ambulation SPO2 at 97%. Pt then returned to supine with supervision, scooting up towards [facility] with use of UEs on bedrail and Roe. Pt positioned for comfort and educated to call for assist before getting up, pt verbalized understanding.  Pt left with all needs met and call bell in reach. RN notified of position and participation. Progression toward goals:   []      Improving appropriately and progressing toward goals  [x]      Improving slowly and progressing toward goals  []      Not making progress toward goals and plan of care will be adjusted     PLAN:  Patient continues to benefit from skilled intervention to address the above impairments. Continue treatment per established plan of care. Discharge Recommendations:  Home Health with 24/7 assist   Further Equipment Recommendations for Discharge:  rolling walker     SUBJECTIVE:   Patient stated I am in here because of the Covid shot! Xavi Gallegos    OBJECTIVE DATA SUMMARY:   Critical Behavior:  Neurologic State: Alert  Orientation Level: Oriented X4  Cognition: Follows commands  Safety/Judgement: Awareness of environment, Fall prevention  Functional Mobility Training:  Bed Mobility:     Supine to Sit: Contact guard assistance;Minimum assistance  Sit to Supine: Supervision  Scooting: Minimum assistance(use of bedrails )    Transfers:  Sit to Stand: Supervision  Stand to Sit: Supervision    Balance:  Sitting: Intact  Sitting - Static: Good (unsupported)  Sitting - Dynamic: Good (unsupported)  Standing: Intact; With support  Standing - Static: Good  Standing - Dynamic : Good      Posture:   Posture (WDL): Within defined limits     Ambulation/Gait Training:  Distance (ft): 100 Feet (ft)  Assistive Device: Walker, rolling  Ambulation - Level of Assistance: Supervision    Gait Abnormalities: Decreased step clearance    Base of Support: Widened     Speed/Shweta: Slow  Step Length: Right shortened;Left shortened    Therapeutic Exercises:   Instructed in and completed the following       EXERCISE   Sets   Reps   Active Active Assist   Passive Self ROM   Comments   Ankle Pumps 1 10  [x] [] [] []    Quad Sets/Glut Sets    [] [] [] [] Hold for 5 secs   Hamstring Sets   [] [] [] []    Short Arc Quads   [] [] [] [] Heel Slides   [] [] [] []    Straight Leg Raises   [] [] [] []    Hip Add   [] [] [] [] Hold for 5 secs, w/ pillow squeeze   Long Arc Quads 1 10 [x] [] [] []    Seated Marching 1 10 [x] [] [] []    Standing Marching   [] [] [] []       [] [] [] []        Pain:  Pain level pre-treatment: 0/10  Pain level post-treatment: 0/10     Activity Tolerance:   Good activity tolerance on RA    Please refer to the flowsheet for vital signs taken during this treatment. After treatment:   [] Patient left in no apparent distress sitting up in chair  [x] Patient left in no apparent distress in bed  [x] Call bell left within reach  [x] Nursing notified  [] Caregiver present  [] Bed alarm activated  [] SCDs applied      COMMUNICATION/EDUCATION:   [x]         Role of Physical Therapy in the acute care setting. [x]         Fall prevention education was provided and the patient/caregiver indicated understanding. [x]         Patient/family have participated as able in working toward goals and plan of care. [x]         Patient/family agree to work toward stated goals and plan of care. []         Patient understands intent and goals of therapy, but is neutral about his/her participation.   []         Patient is unable to participate in stated goals/plan of care: ongoing with therapy staff.  []         Other:        Holly Rice, PT   Time Calculation: 27 mins

## 2021-04-21 NOTE — PROGRESS NOTES
*ATTENTION:  This note has been created by a medical student for educational purposes only. Please do not refer to the content of this note for clinical decision-making, billing, or other purposes. Please see attending physicians note to obtain clinical information on this patient. *            Progress Note    Patient: Ellen Bright MRN: [id number]  SSN: [id number]    YOB: 1945  Age: 67 y.o. Sex: female      Admit Date: 4/15/2021    LOS: 6 days     Subjective:   Mrs. Sarahi Granger is a 68 yo female with CHF, pacer, HTN, HLD, afib, was admitted progressive shortness of breath. No acute events overnight. This morning patient was sleeping comfortably and spoke with her son. According to son, patient has been sleeping since he arrived and has not have much interaction with the patient. Objective:     Vitals:    04/20/21 2059 04/20/21 2307 04/21/21 0353 04/21/21 0813   BP: (!) 154/101 113/89 (!) 163/102 128/89   Pulse: 87 88 84 92   Resp: 22 22 20 20   Temp: 96.9 °F (36.1 °C) 97.8 °F (36.6 °C) 97.8 °F (36.6 °C) 97.5 °F (36.4 °C)   SpO2: 100% 96% 100% 100%   Weight:   92.4 kg (203 lb 9.6 oz)    Height:            Intake and Output:  Current Shift: No intake/output data recorded.   Last three shifts: 04/19 1901 - 04/21 0700  In: 100 [P.O.:100]  Out: 650 [Urine:650]    Physical Exam:   General: NAD  Heart: irregular rhythm, no rub or gallop  Lungs: mild crackles but no wheezing in all areas  Extremities: no pitting edema noted    Lab/Data Review:  Recent Results (from the past 12 hour(s))   METABOLIC PANEL, BASIC    Collection Time: 04/21/21  3:34 AM   Result Value Ref Range    Sodium 142 136 - 145 mmol/L    Potassium 4.0 3.5 - 5.5 mmol/L    Chloride 108 100 - 111 mmol/L    CO2 26 21 - 32 mmol/L    Anion gap 8 3.0 - 18 mmol/L    Glucose 105 (H) 74 - 99 mg/dL    BUN 32 (H) 7.0 - 18 MG/DL    Creatinine 1.53 (H) 0.6 - 1.3 MG/DL    BUN/Creatinine ratio 21 (H) 12 - 20      GFR est AA 40 (L) >60 ml/min/1.73m2 GFR est non-AA 33 (L) >60 ml/min/1.73m2    Calcium 9.3 8.5 - 10.1 MG/DL       Assessment:   1. Acute on chronic systolic CHF  2. Non-ischemic cardiomyopathy with present of ICD   3. Persistent AFIB, on Eliquis   4. Indeterminate troponin elevation, likely demand ischemia due to CHF   5. Hypertension - stable  6. Hyperlipidemia  7. Morbid obesity   8. Hypokalemia  - resolved  9. Hard of hearing   10. Acute renal insufficiency from over diuresis   11. Confusion, bizarre behavior previously reported outpatient, ?hallucination? Plan:   1. Following Nephrology recommendation: Continue lasix, coreg. On hold Entresto until creatinine (1.53) decreases per cardio  6. Continue lipitor  11.  MRI Brain if ICD compatible  Avoid nephrotoxic meds  Pt/OT assessment  Monitor electrolytes and replace as needed    Signed By: Jane Marcial     April 21, 2021

## 2021-04-21 NOTE — PROGRESS NOTES
In Patient Progress note      Admit Date: 4/15/2021    Impression:     #1 ORIN on CKD 3, baseline creatinine appears to be <1  #2 acute on chronic systolic congestive heart failure  #3 nonischemic cardiomyopathy with EF of 15 to 20%  #4 chronic atrial fibrillation  #5 hypertension, controlled  #6 morbid obesity  #8 Mild right renal pelvicocaliectasis as seen on CT     Plan:  #1 lasix 40 mg BID PO   #2 Continue to hold Entresto   #3 allow BP to be ~ 130-140s SBP  #4 low-salt diet  #5 urology recs follow  #6 avoid NSAIDs and other nephrotoxins or IV contrast    Ok to d/c with f/u in nephro office in 2 weeks  Will need BMP in 1 weeks        Please call with questions     Monte Harada, MD FASN  Cell [phone]  Pager: [phone]     Subjective:     - No acute over night events. - respiratory - stable  - hemodynamics - stable, no pressrs  - UOP-good  - Nutrition -ok    Objective:     Visit Vitals  BP (!) 121/90 (BP 1 Location: Right upper arm, BP Patient Position: At rest) Comment: Reported to [employer]   Pulse 90   Temp 97.3 °F (36.3 °C)   Resp 20   Ht 5' 6\" (1.676 m)   Wt 92.4 kg (203 lb 9.6 oz)   SpO2 99%   Breastfeeding Unknown   BMI 32.86 kg/m²         Intake/Output Summary (Last 24 hours) at 4/21/2021 1609  Last data filed at 4/21/2021 2117  Gross per 24 hour   Intake 0 ml   Output    Net 0 ml       Physical Exam:        Patient is in no apparent distress. HEENT: Head is normocephalic and atraumatic. Neck: no cervical lymphadenopathy or thyromegaly. Lungs: good air entry, clear to auscultation bilaterally. Trachea at the midline. Cardiovascular system: S1, S2, regular rate and rhythm. Abdomen: soft, non tender, non distended. Positive bowel sounds. Extremities: no edema  Integumentary: skin is grossly intact. Neurologic: Alert, oriented time three. Data Review:    No results for input(s): WBC, RBC, HCT, MCV, MCH, MCHC, RDW, HCTEXT, HCTEXT in the last 72 hours.     No lab exists for component: HEMOGLOBIN, PLATELET, MPV  Recent Labs     04/21/21  0334 04/20/21  0315 04/19/21  0033   BUN 32* 31* 33*   CREA 1.53* 1.41* 1.38*   CA 9.3 8.9 8.6   K 4.0 3.3* 4.0    141 137    106 105   CO2 26 29 24   PHOS  --  3.4  --    * 97 145*       Leandro Melo MD

## 2021-04-21 NOTE — PROGRESS NOTES
conducted a Follow up consultation and Spiritual Assessment for Ellen Bright, who is a 67 y.o.,female. The  provided the following Interventions:  Continued the relationship of care and support. Listened empathically; The patient is hard of hearing or deaf; yet, his son was able to help her and process. Offered prayer and assurance of continued prayer on patients behalf. Chart reviewed. The following outcomes were achieved:  Patient expressed gratitude for 's visit. Assessment:  There are no further spiritual or Methodist issues which require Spiritual Care Services interventions at this time. Plan:  Chaplains will continue to follow and will provide pastoral care on an as needed/requested basis.  recommends bedside caregivers page  on duty if patient shows signs of acute spiritual or emotional distress.        [facility]   [phone]

## 2021-04-22 ENCOUNTER — PATIENT OUTREACH (OUTPATIENT)
Dept: CASE MANAGEMENT | Age: 76
End: 2021-04-22

## 2021-04-22 LAB — SARS-COV-2, COV2NT: NOT DETECTED

## 2021-04-22 NOTE — PROGRESS NOTES
4/22/2021 10:16 AM    CTN attempted to contact patient for ED follow up. Patient seen at [facility] on 4/21/21 for viral symptoms and to rule out COVID. Message left introducing myself, the purpose of the call and giving my contact information. Requested that patient call back at her earliest convenience.

## 2021-04-23 ENCOUNTER — PATIENT OUTREACH (OUTPATIENT)
Dept: CASE MANAGEMENT | Age: 76
End: 2021-04-23

## 2021-04-23 NOTE — PROGRESS NOTES
4/23/2021 10:16 AM    CTN attempted to contact patient for ED follow up. Patient seen at [facility] on 4/21/21 for viral symptoms and to rule out COVID. Message left introducing myself, the purpose of the call and giving my contact information. Requested that patient call back at her earliest convenience. Unable to reach patient x 2 attempts. Episode closed.

## 2021-05-04 ENCOUNTER — HOSPITAL ENCOUNTER (EMERGENCY)
Age: 76
Discharge: HOME | End: 2021-05-04
Attending: EMERGENCY MEDICINE
Payer: MEDICARE

## 2021-05-04 ENCOUNTER — APPOINTMENT (OUTPATIENT)
Dept: GENERAL RADIOLOGY | Age: 76
End: 2021-05-04
Attending: NURSE PRACTITIONER
Payer: MEDICARE

## 2021-05-04 VITALS
DIASTOLIC BLOOD PRESSURE: 94 MMHG | WEIGHT: 203 LBS | HEIGHT: 67 IN | BODY MASS INDEX: 31.86 KG/M2 | TEMPERATURE: 98.3 F | HEART RATE: 92 BPM | SYSTOLIC BLOOD PRESSURE: 114 MMHG | RESPIRATION RATE: 16 BRPM | OXYGEN SATURATION: 100 %

## 2021-05-04 DIAGNOSIS — M79.642 PAIN OF LEFT HAND: ICD-10-CM
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
## 2021-05-04 DIAGNOSIS — M79.89 SWELLING OF LEFT HAND: ICD-10-CM

## 2021-05-04 DIAGNOSIS — S93.401A SPRAIN OF RIGHT ANKLE, UNSPECIFIED LIGAMENT, INITIAL ENCOUNTER: ICD-10-CM

## 2021-05-04 PROCEDURE — 99283 EMERGENCY DEPT VISIT LOW MDM: CPT

## 2021-05-04 PROCEDURE — 73610 X-RAY EXAM OF ANKLE: CPT

## 2021-05-04 PROCEDURE — 74011636637 HC RX REV CODE- 636/637: Performed by: PHYSICIAN ASSISTANT

## 2021-05-04 PROCEDURE — 74011250637 HC RX REV CODE- 250/637: Performed by: PHYSICIAN ASSISTANT

## 2021-05-04 PROCEDURE — 73130 X-RAY EXAM OF HAND: CPT

## 2021-05-04 RX ORDER — PREDNISONE 10 MG/1
TABLET ORAL
Qty: 21 TAB | Refills: 0 | Status: SHIPPED | OUTPATIENT
Start: 2021-05-04

## 2021-05-04 RX ORDER — ACETAMINOPHEN AND CODEINE PHOSPHATE 300; 30 MG/1; MG/1
1 TABLET ORAL
Status: COMPLETED | OUTPATIENT
Start: 2021-05-04 | End: 2021-05-04

## 2021-05-04 RX ORDER — PREDNISONE 20 MG/1
60 TABLET ORAL
Status: COMPLETED | OUTPATIENT
Start: 2021-05-04 | End: 2021-05-04

## 2021-05-04 RX ORDER — ACETAMINOPHEN AND CODEINE PHOSPHATE 300; 30 MG/1; MG/1
1 TABLET ORAL
Qty: 8 TAB | Refills: 0 | Status: SHIPPED | OUTPATIENT
Start: 2021-05-04 | End: 2021-05-09

## 2021-05-04 RX ADMIN — ACETAMINOPHEN AND CODEINE PHOSPHATE 1 TABLET: 300; 30 TABLET ORAL at 19:51

## 2021-05-04 RX ADMIN — PREDNISONE 60 MG: 20 TABLET ORAL at 19:51

## 2021-05-04 NOTE — ED NOTES
I performed a brief evaluation, including history and physical, of the patient here in triage and I have determined that the patient will need further treatment and evaluation from the main side ER provider. I have placed initial orders to help in expediting patients care. May 04, 2021 at 5:39 PM - MEHNAZ Chavez        There were no vitals taken for this visit.

## 2021-05-04 NOTE — ED PROVIDER NOTES
EMERGENCY DEPARTMENT HISTORY AND PHYSICAL EXAM    Date: 5/4/2021  Patient Name: Edilson Escalante    History of Presenting Illness     Chief Complaint   Patient presents with    Ankle Pain    Hand Swelling         History Provided By: patient     Chief Complaint:  right ankle pain s/p fall, left hand swelling  Duration: ankle pain - 5hrs, hand swelling- 4 days  Timing:  Waxing and Waning   Location: right ankle pain, left hand swelling  Quality: Sharp  Severity: Moderate  Modifying Factors: with movement or touch  Associated Symptoms: none      Additional History (Context): Edilson Escalante is a 67 y.o. female with PMH CHF, HTN, a-fib, CVA, gout and hard of hearing, who presents with c/o atraumatic left hand swelling/pain x 4 days and ankle pain s/p fall 5 hrs ago. Pt was brought in via EMS after pt's son found her on her bedroom floor at 3pm. Pt reports slipping and losing her balance while ambulating from her commode to her bed around 1pm. Pt c/o right ankle pain since fall, she denies lightheadedness, fainting, LOC or head injury. Per pt's son, she uses a walker at home to ambulate and was home alone today but usually has home health aid or family members at home. Pt report left hand swelling with pain on touch, she has tried ice with mild acute relief. No other complaints reported at this time. PCP: Bobby Kerns MD    Current Outpatient Medications   Medication Sig Dispense Refill    acetaminophen-codeine (Tylenol-Codeine #3) 300-30 mg per tablet Take 1 Tab by mouth every six (6) hours as needed for Pain for up to 5 days. Max Daily Amount: 4 Tabs. 8 Tab 0    predniSONE (STERAPRED DS) 10 mg dose pack Use as directed 21 Tab 0    furosemide (LASIX) 40 mg tablet Take 1 Tab by mouth three (3) times daily. Indications: fluid in the lungs due to chronic heart failure 30 Tab 0    pantoprazole (PROTONIX) 40 mg tablet Take 1 Tab by mouth daily.  Indications: gastroesophageal reflux disease 30 Tab 1    loratadine (CLARITIN) 10 mg tablet Take 1 Tab by mouth daily. Indications: inflammation of the nose due to an allergy, sneezing 30 Tab 0    fluticasone propionate (FLONASE) 50 mcg/actuation nasal spray 2 Sprays by Both Nostrils route daily. Indications: inflammation of the nose due to an allergy 1 Bottle 0    carvediloL (COREG) 12.5 mg tablet Take 1 Tab by mouth every twelve (12) hours. Indications: ventricular rate control in atrial fibrillation 60 Tab 3    albuterol (Ventolin HFA) 90 mcg/actuation inhaler Take 2 Puffs by inhalation every four (4) hours as needed for Wheezing. Indications: bronchospasm prevention 1 Inhaler 2    hydrALAZINE (APRESOLINE) 50 mg tablet Take 1 Tab by mouth three (3) times daily. Indications: high blood pressure 90 Tab 1    QUEtiapine (SEROquel) 25 mg tablet Take 1 Tab by mouth nightly. 30 Tab 2    isosorbide mononitrate ER (IMDUR) 30 mg tablet Take 30 mg by mouth every morning.  cholecalciferol (Vitamin D3) 25 mcg (1,000 unit) cap Take 1,000 Units by mouth daily.  acetaminophen (Tylenol Extra Strength) 500 mg tablet Take 500 mg by mouth every six (6) hours as needed for Pain.  magnesium oxide (MAG-OX) 400 mg tablet Take 400 mg by mouth daily.  amLODIPine (NORVASC) 10 mg tablet Take 10 mg by mouth daily.  Eliquis 5 mg tablet Take 5 mg by mouth two (2) times a day.  atorvastatin (LIPITOR) 20 mg tablet Take 20 mg by mouth daily.  aspirin delayed-release (ASPIRIN LOW DOSE) 81 mg tablet Take 1 Tab by mouth daily.  100 Tab 2       Past History     Past Medical History:  Past Medical History:   Diagnosis Date    Atrial fibrillation ([facility] [address]     10/11 per Dr Adames Eye note; converted to SR and of [place] for 3 months already and staying in [place] [facility]    Cerebrovascular accident [facility]) 10/2011    left mu; improved almost completely    Essential hypertension     Essential hypertension, benign     Hard of hearing 3/20/2015    Other and unspecified hyperlipidemia     LDLs are at goal, triglycerides are at goal, HDLs are at goal       Past Surgical History:  Past Surgical History:   Procedure Laterality Date    HX HYSTERECTOMY      total       Family History:  Family History   Problem Relation Age of Onset    High Cholesterol Sister     Hypertension Other         family history of essential hypertension    Heart Attack Neg Hx     Sudden Death Neg Hx        Social History:  Social History     Tobacco Use    Smoking status: Never Smoker    Smokeless tobacco: Never Used   Substance Use Topics    Alcohol use: No     Comment: occasional, quit     Drug use: No       Allergies: Allergies   Allergen Reactions    Tetanus Vaccines And Toxoid Swelling    Penicillins Swelling         Review of Systems   Review of Systems   Constitutional: Negative for chills and fever. HENT: Negative for congestion. Respiratory: Negative for chest tightness and wheezing. Cardiovascular: Negative for chest pain and leg swelling. Gastrointestinal: Negative for abdominal pain, nausea and vomiting. Musculoskeletal: Positive for arthralgias and joint swelling. Negative for back pain. Neurological: Negative for dizziness, syncope, numbness and headaches. All other systems reviewed and are negative. All Other Systems Negative  Physical Exam     Vitals:    05/04/21 1740 05/04/21 1741   BP:  (!) 114/94   Pulse: 92    Resp: 16    Temp:  98.3 °F (36.8 °C)   SpO2: 100%    Weight: 92.1 kg (203 lb)    Height: 5' 7\" (1.702 m)      Physical Exam  Vitals signs and nursing note reviewed. Constitutional:       General: She is in acute distress. Appearance: She is well-developed. She is obese. She is not diaphoretic. Comments: Appears mildly distressed   HENT:      Head: Normocephalic and atraumatic. Eyes:      General: No scleral icterus. Right eye: No discharge. Left eye: No discharge. Extraocular Movements: Extraocular movements intact.       Conjunctiva/sclera: Conjunctivae normal.   Neck:      Musculoskeletal: Normal range of motion and neck supple. Cardiovascular:      Rate and Rhythm: Normal rate and regular rhythm. Pulses: Normal pulses. Pulmonary:      Effort: Pulmonary effort is normal. No respiratory distress. Breath sounds: No stridor. Abdominal:      General: Bowel sounds are normal.      Palpations: Abdomen is soft. Musculoskeletal:      Left wrist: She exhibits tenderness (fourth and fifth metacarpal ) and swelling. Comments: LUE: intact pulses, moderate amount of non-pitting edema noted over the dorsum of the hand with TTP noted over the 2nd 3rd and 4th MCP joints. ROM of the hand and digits intact. RLE: intact distal pulses, slight edema with significant noted over the lateral malleolus. Limited ROM upon dorsiflexion of the ankle due to pain. No deformity noted on exam.    Skin:     General: Skin is warm and dry. Findings: No erythema or rash. Neurological:      General: No focal deficit present. Mental Status: She is alert and oriented to person, place, and time. Mental status is at baseline. Comments: No focal neurological deficit noted. No facial droop, slurred speech, or evidence of altered mentation noted on exam.     Psychiatric:         Behavior: Behavior normal.         Thought Content: Thought content normal.                Diagnostic Study Results     Labs -   No results found for this or any previous visit (from the past 12 hour(s)). Radiologic Studies -   XR ANKLE RT MIN 3 V    (Results Pending)   XR HAND LT MIN 3 V    (Results Pending)     CT Results  (Last 48 hours)    None        CXR Results  (Last 48 hours)    None            Medical Decision Making   I am the first provider for this patient. I reviewed the vital signs, available nursing notes, past medical history, past surgical history, family history and social history. Vital Signs-Reviewed the patient's vital signs.       Records Reviewed: April J Emerald Gimenez PA-C     Procedures:  Procedures    Provider Notes (Medical Decision Making): Impression:  Hand swelling, ankle swelling    X-rays degenerative changes, without definite acute process or fx. Ankle placed in a walker boot. Hand pain and swelling, pt states, is similar to prior episodes of gout. No signs/sx of cellulitis noted. Will treat with prednisone for gout, and short course of tylenol # 3 for pain control. pcp follow-up this week. Pt agrees with this plan. Shefali Glaser PA-C     MED RECONCILIATION:  No current facility-administered medications for this encounter. Current Outpatient Medications   Medication Sig    acetaminophen-codeine (Tylenol-Codeine #3) 300-30 mg per tablet Take 1 Tab by mouth every six (6) hours as needed for Pain for up to 5 days. Max Daily Amount: 4 Tabs.  predniSONE (STERAPRED DS) 10 mg dose pack Use as directed    furosemide (LASIX) 40 mg tablet Take 1 Tab by mouth three (3) times daily. Indications: fluid in the lungs due to chronic heart failure    pantoprazole (PROTONIX) 40 mg tablet Take 1 Tab by mouth daily. Indications: gastroesophageal reflux disease    loratadine (CLARITIN) 10 mg tablet Take 1 Tab by mouth daily. Indications: inflammation of the nose due to an allergy, sneezing    fluticasone propionate (FLONASE) 50 mcg/actuation nasal spray 2 Sprays by Both Nostrils route daily. Indications: inflammation of the nose due to an allergy    carvediloL (COREG) 12.5 mg tablet Take 1 Tab by mouth every twelve (12) hours. Indications: ventricular rate control in atrial fibrillation    albuterol (Ventolin HFA) 90 mcg/actuation inhaler Take 2 Puffs by inhalation every four (4) hours as needed for Wheezing. Indications: bronchospasm prevention    hydrALAZINE (APRESOLINE) 50 mg tablet Take 1 Tab by mouth three (3) times daily. Indications: high blood pressure    QUEtiapine (SEROquel) 25 mg tablet Take 1 Tab by mouth nightly.     isosorbide mononitrate ER (IMDUR) 30 mg tablet Take 30 mg by mouth every morning.  cholecalciferol (Vitamin D3) 25 mcg (1,000 unit) cap Take 1,000 Units by mouth daily.  acetaminophen (Tylenol Extra Strength) 500 mg tablet Take 500 mg by mouth every six (6) hours as needed for Pain.  magnesium oxide (MAG-OX) 400 mg tablet Take 400 mg by mouth daily.  amLODIPine (NORVASC) 10 mg tablet Take 10 mg by mouth daily.  Eliquis 5 mg tablet Take 5 mg by mouth two (2) times a day.  atorvastatin (LIPITOR) 20 mg tablet Take 20 mg by mouth daily.  aspirin delayed-release (ASPIRIN LOW DOSE) 81 mg tablet Take 1 Tab by mouth daily. Disposition:  D/c    DISCHARGE NOTE:   Patient is stable for discharge at this time. I have discussed all the findings from today's work up with the patient, including lab results and imaging. I have answered all questions. Rx for prednisone and tylenol # 3 given. Rest and close follow-up with the PCP recommended this week. Return to the ED immediately for any new or worsening symptoms. Shefali Glaser PA-C     Follow-up Information     Follow up With Specialties Details Why Contact Info    your primary care doctor  In 3 days      [facility] EMERGENCY DEPT Emergency Medicine  As needed, If symptoms worsen [address] [place] [id number]  [phone]          Current Discharge Medication List      START taking these medications    Details   acetaminophen-codeine (Tylenol-Codeine #3) 300-30 mg per tablet Take 1 Tab by mouth every six (6) hours as needed for Pain for up to 5 days. Max Daily Amount: 4 Tabs. Qty: 8 Tab, Refills: 0    Associated Diagnoses: Swelling of left hand; Pain of left hand; Sprain of right ankle, unspecified ligament, initial encounter      predniSONE (STERAPRED DS) 10 mg dose pack Use as directed  Qty: 21 Tab, Refills: 0                   Diagnosis     Clinical Impression:   1. Fall, initial encounter    2. Swelling of left hand    3. Pain of left hand    4.  Sprain of right ankle, unspecified ligament, initial encounter

## 2021-05-04 NOTE — DISCHARGE INSTRUCTIONS
[employer] Activation    Thank you for requesting access to [employer]. Please follow the instructions below to securely access and download your online medical record. [employer] allows you to send messages to your doctor, view your test results, renew your prescriptions, schedule appointments, and more. How Do I Sign Up? In your internet browser, go to [email or web address]  Click on the First Time User? Click Here link in the Sign In box. You will be redirect to the New Member Sign Up page. Enter your [employer] Access Code exactly as it appears below. You will not need to use this code after youve completed the sign-up process. If you do not sign up before the expiration date, you must request a new code. [employer] Access Code: [unfilled] (This is the date your [employer] access code will )    Enter the last four digits of your Social Security Number (xxxx) and Date of Birth (mm/dd/yyyy) as indicated and click Submit. You will be taken to the next sign-up page. Create a [employer] ID. This will be your [employer] login ID and cannot be changed, so think of one that is secure and easy to remember. Create a [employer] password. You can change your password at any time. Enter your Password Reset Question and Answer. This can be used at a later time if you forget your password. Enter your e-mail address. You will receive e-mail notification when new information is available in [address] Click Sign Up. You can now view and download portions of your medical record. Click the [place] link to download a portable copy of your medical information. Additional Information    If you have questions, please visit the Frequently Asked Questions section of the [employer] website at [email or web address] Remember, [employer] is NOT to be used for urgent needs. For medical emergencies, dial 911.

## 2022-03-19 PROBLEM — Z95.0 PACEMAKER: Status: ACTIVE | Noted: 2021-04-15

## 2022-03-20 PROBLEM — I50.23 ACUTE ON CHRONIC SYSTOLIC (CONGESTIVE) HEART FAILURE (HCC): Status: ACTIVE | Noted: 2021-04-15

## 2022-03-20 PROBLEM — I10 HTN (HYPERTENSION): Status: ACTIVE | Noted: 2021-04-15

## 2023-05-29 ENCOUNTER — HOSPITAL ENCOUNTER (EMERGENCY)
Facility: HOSPITAL | Age: 78
Discharge: HOME | End: 2023-05-29
Attending: STUDENT IN AN ORGANIZED HEALTH CARE EDUCATION/TRAINING PROGRAM
Payer: MEDICARE

## 2023-05-29 ENCOUNTER — APPOINTMENT (OUTPATIENT)
Facility: HOSPITAL | Age: 78
End: 2023-05-29
Payer: MEDICARE

## 2023-05-29 VITALS
DIASTOLIC BLOOD PRESSURE: 104 MMHG | HEART RATE: 86 BPM | SYSTOLIC BLOOD PRESSURE: 172 MMHG | RESPIRATION RATE: 20 BRPM | TEMPERATURE: 97.7 F | OXYGEN SATURATION: 100 %

## 2023-05-29 DIAGNOSIS — I50.9 CONGESTIVE HEART FAILURE, UNSPECIFIED HF CHRONICITY, UNSPECIFIED HEART FAILURE TYPE (HCC): Primary | ICD-10-CM

## 2023-05-29 LAB
ALBUMIN SERPL-MCNC: 3.2 G/DL (ref 3.4–5)
ALBUMIN/GLOB SERPL: 0.9 (ref 0.8–1.7)
ALP SERPL-CCNC: 96 U/L (ref 45–117)
ALT SERPL-CCNC: 25 U/L (ref 13–56)
ANION GAP SERPL CALC-SCNC: 6 MMOL/L (ref 3–18)
AST SERPL-CCNC: 24 U/L (ref 10–38)
BASOPHILS # BLD: 0.1 K/UL (ref 0–0.1)
BASOPHILS NFR BLD: 2 % (ref 0–2)
BILIRUB SERPL-MCNC: 0.4 MG/DL (ref 0.2–1)
BUN SERPL-MCNC: 20 MG/DL (ref 7–18)
BUN/CREAT SERPL: 20 (ref 12–20)
CALCIUM SERPL-MCNC: 8.9 MG/DL (ref 8.5–10.1)
CHLORIDE SERPL-SCNC: 112 MMOL/L (ref 100–111)
CO2 SERPL-SCNC: 25 MMOL/L (ref 21–32)
CREAT SERPL-MCNC: 1.01 MG/DL (ref 0.6–1.3)
DIFFERENTIAL METHOD BLD: ABNORMAL
EKG DIAGNOSIS: NORMAL
EKG Q-T INTERVAL: 366 MS
EKG QRS DURATION: 116 MS
EKG QTC CALCULATION (BAZETT): 472 MS
EKG R AXIS: -25 DEGREES
EKG T AXIS: 171 DEGREES
EKG VENTRICULAR RATE: 100 BPM
EOSINOPHIL # BLD: 0.1 K/UL (ref 0–0.4)
EOSINOPHIL NFR BLD: 2 % (ref 0–5)
ERYTHROCYTE [DISTWIDTH] IN BLOOD BY AUTOMATED COUNT: 17 % (ref 11.6–14.5)
GLOBULIN SER CALC-MCNC: 3.6 G/DL (ref 2–4)
GLUCOSE SERPL-MCNC: 107 MG/DL (ref 74–99)
HCT VFR BLD AUTO: 33.5 % (ref 35–45)
HGB BLD-MCNC: 11.2 G/DL (ref 12–16)
IMM GRANULOCYTES # BLD AUTO: 0 K/UL (ref 0–0.04)
IMM GRANULOCYTES NFR BLD AUTO: 0 % (ref 0–0.5)
LYMPHOCYTES # BLD: 2.3 K/UL (ref 0.9–3.6)
LYMPHOCYTES NFR BLD: 34 % (ref 21–52)
MCH RBC QN AUTO: 30.9 PG (ref 24–34)
MCHC RBC AUTO-ENTMCNC: 33.4 G/DL (ref 31–37)
MCV RBC AUTO: 92.5 FL (ref 78–100)
MONOCYTES # BLD: 0.3 K/UL (ref 0.05–1.2)
MONOCYTES NFR BLD: 4 % (ref 3–10)
NEUTS SEG # BLD: 4.1 K/UL (ref 1.8–8)
NEUTS SEG NFR BLD: 58 % (ref 40–73)
NRBC # BLD: 0 K/UL (ref 0–0.01)
NRBC BLD-RTO: 0 PER 100 WBC
NT PRO BNP: 7957 PG/ML (ref 0–1800)
PLATELET # BLD AUTO: 202 K/UL (ref 135–420)
PLATELET COMMENT: ABNORMAL
PMV BLD AUTO: 12.8 FL (ref 9.2–11.8)
POTASSIUM SERPL-SCNC: 3.4 MMOL/L (ref 3.5–5.5)
PROT SERPL-MCNC: 6.8 G/DL (ref 6.4–8.2)
RBC # BLD AUTO: 3.62 M/UL (ref 4.2–5.3)
RBC MORPH BLD: ABNORMAL
SODIUM SERPL-SCNC: 143 MMOL/L (ref 136–145)
TROPONIN I SERPL HS-MCNC: 28 NG/L (ref 0–54)
WBC # BLD AUTO: 6.9 K/UL (ref 4.6–13.2)

## 2023-05-29 PROCEDURE — 85025 COMPLETE CBC W/AUTO DIFF WBC: CPT

## 2023-05-29 PROCEDURE — 94761 N-INVAS EAR/PLS OXIMETRY MLT: CPT

## 2023-05-29 PROCEDURE — 99285 EMERGENCY DEPT VISIT HI MDM: CPT

## 2023-05-29 PROCEDURE — 93005 ELECTROCARDIOGRAM TRACING: CPT | Performed by: STUDENT IN AN ORGANIZED HEALTH CARE EDUCATION/TRAINING PROGRAM

## 2023-05-29 PROCEDURE — 84484 ASSAY OF TROPONIN QUANT: CPT

## 2023-05-29 PROCEDURE — 93010 ELECTROCARDIOGRAM REPORT: CPT | Performed by: INTERNAL MEDICINE

## 2023-05-29 PROCEDURE — 83880 ASSAY OF NATRIURETIC PEPTIDE: CPT

## 2023-05-29 PROCEDURE — 96375 TX/PRO/DX INJ NEW DRUG ADDON: CPT

## 2023-05-29 PROCEDURE — 96374 THER/PROPH/DIAG INJ IV PUSH: CPT

## 2023-05-29 PROCEDURE — 71045 X-RAY EXAM CHEST 1 VIEW: CPT

## 2023-05-29 PROCEDURE — 80053 COMPREHEN METABOLIC PANEL: CPT

## 2023-05-29 PROCEDURE — 6370000000 HC RX 637 (ALT 250 FOR IP): Performed by: STUDENT IN AN ORGANIZED HEALTH CARE EDUCATION/TRAINING PROGRAM

## 2023-05-29 PROCEDURE — 6360000002 HC RX W HCPCS: Performed by: STUDENT IN AN ORGANIZED HEALTH CARE EDUCATION/TRAINING PROGRAM

## 2023-05-29 RX ORDER — HYDRALAZINE HYDROCHLORIDE 50 MG/1
50 TABLET, FILM COATED ORAL ONCE
Status: COMPLETED | OUTPATIENT
Start: 2023-05-29 | End: 2023-05-29

## 2023-05-29 RX ORDER — KETOROLAC TROMETHAMINE 15 MG/ML
15 INJECTION, SOLUTION INTRAMUSCULAR; INTRAVENOUS
Status: COMPLETED | OUTPATIENT
Start: 2023-05-29 | End: 2023-05-29

## 2023-05-29 RX ORDER — FUROSEMIDE 10 MG/ML
80 INJECTION INTRAMUSCULAR; INTRAVENOUS
Status: COMPLETED | OUTPATIENT
Start: 2023-05-29 | End: 2023-05-29

## 2023-05-29 RX ADMIN — KETOROLAC TROMETHAMINE 15 MG: 15 INJECTION, SOLUTION INTRAMUSCULAR; INTRAVENOUS at 09:54

## 2023-05-29 RX ADMIN — FUROSEMIDE 80 MG: 10 INJECTION, SOLUTION INTRAMUSCULAR; INTRAVENOUS at 09:53

## 2023-05-29 RX ADMIN — HYDRALAZINE HYDROCHLORIDE 50 MG: 50 TABLET, FILM COATED ORAL at 09:54

## 2023-05-29 ASSESSMENT — ENCOUNTER SYMPTOMS
NAUSEA: 0
CHEST TIGHTNESS: 0
VOMITING: 0
SHORTNESS OF BREATH: 1
DIARRHEA: 0
ABDOMINAL PAIN: 0

## 2023-05-29 NOTE — ED PROVIDER NOTES
EMERGENCY DEPARTMENT HISTORY AND PHYSICAL EXAM      Date: 5/29/2023  Patient Name: Leena Toletnino    History of Presenting Illness     Chief Complaint   Patient presents with    Shortness of Breath       Patient is a 76-year-old female with past medical history significant for CHF, hypertension, hypercholesterolemia, atrial fibrillation on Eliquis presenting to the emergency department for evaluation of shortness of breath. Patient reports shortness of breath that has been ongoing for the past week but got worse this morning. Reports that she feels like she is retaining fluid despite compliant with her daily 40 mg Lasix. She denies having any chest pain, abdominal pain, fevers or chills        PCP: Kenny Quiroz MD    No current facility-administered medications for this encounter. Current Outpatient Medications   Medication Sig Dispense Refill    acetaminophen (TYLENOL) 500 MG tablet Take 500 mg by mouth every 6 hours as needed      acetaminophen-codeine (TYLENOL #3) 300-30 MG per tablet TAKE 1 TABLET BY MOUTH EVERY 6 HOURS AS NEEDED FOR PAIN FOR UP TO 5 DAYS.  MAX DAILY AMOUNT 4 TABS      albuterol sulfate HFA (PROVENTIL;VENTOLIN;PROAIR) 108 (90 Base) MCG/ACT inhaler Inhale 2 puffs into the lungs every 4 hours as needed      amLODIPine (NORVASC) 10 MG tablet Take 10 mg by mouth daily      apixaban (ELIQUIS) 5 MG TABS tablet Take 5 mg by mouth 2 times daily      aspirin 81 MG EC tablet Take 81 mg by mouth daily      atorvastatin (LIPITOR) 20 MG tablet Take 20 mg by mouth daily      carvedilol (COREG) 12.5 MG tablet Take 12.5 mg by mouth 2 times daily (with meals)      Cholecalciferol 50 MCG (2000 UT) CAPS Take by mouth daily      vitamin D 25 MCG (1000 UT) CAPS Take 1,000 Units by mouth daily      clotrimazole (LOTRIMIN) 1 % cream Apply topically 2 times daily      fluticasone (FLONASE) 50 MCG/ACT nasal spray 2 sprays by Nasal route daily      furosemide (LASIX) 40 MG tablet Take 40 mg by mouth

## 2023-05-29 NOTE — DISCHARGE INSTRUCTIONS
Please make sure to follow-up with your primary care doctor and with your cardiologist to discuss your Lasix and blood pressure medications. Make sure you are taking them all as prescribed. Return to the emergency department for any new or worsening symptoms.

## 2025-08-21 ENCOUNTER — APPOINTMENT (OUTPATIENT)
Facility: HOSPITAL | Age: 80
DRG: 871 | End: 2025-08-21
Payer: MEDICARE

## 2025-08-21 ENCOUNTER — HOSPITAL ENCOUNTER (INPATIENT)
Facility: HOSPITAL | Age: 80
LOS: 6 days | Discharge: NURSING, REHAB OR LONG-TERM CARE | DRG: 871 | End: 2025-08-27
Attending: STUDENT IN AN ORGANIZED HEALTH CARE EDUCATION/TRAINING PROGRAM | Admitting: STUDENT IN AN ORGANIZED HEALTH CARE EDUCATION/TRAINING PROGRAM
Payer: MEDICARE

## 2025-08-21 DIAGNOSIS — R56.9 SEIZURE (HCC): Primary | ICD-10-CM

## 2025-08-21 DIAGNOSIS — I50.9 CONGESTIVE HEART FAILURE, UNSPECIFIED HF CHRONICITY, UNSPECIFIED HEART FAILURE TYPE (HCC): ICD-10-CM

## 2025-08-21 DIAGNOSIS — E87.20 METABOLIC ACIDOSIS: ICD-10-CM

## 2025-08-21 DIAGNOSIS — R41.82 ALTERED MENTAL STATUS, UNSPECIFIED ALTERED MENTAL STATUS TYPE: ICD-10-CM

## 2025-08-21 LAB
ALBUMIN SERPL-MCNC: 3.7 G/DL (ref 3.4–5)
ALBUMIN/GLOB SERPL: 0.9 (ref 0.8–1.7)
ALP SERPL-CCNC: 127 U/L (ref 45–117)
ALT SERPL-CCNC: 10 U/L (ref 10–35)
ANION GAP SERPL CALC-SCNC: 25 MMOL/L (ref 3–18)
APAP SERPL-MCNC: <5 UG/ML (ref 10–30)
AST SERPL-CCNC: 26 U/L (ref 10–38)
BILIRUB SERPL-MCNC: 0.4 MG/DL (ref 0.2–1)
BUN SERPL-MCNC: 21 MG/DL (ref 6–23)
BUN/CREAT SERPL: 14 (ref 12–20)
CALCIUM SERPL-MCNC: 9.9 MG/DL (ref 8.5–10.1)
CHLORIDE SERPL-SCNC: 103 MMOL/L (ref 98–107)
CO2 SERPL-SCNC: 13 MMOL/L (ref 21–32)
CREAT SERPL-MCNC: 1.43 MG/DL (ref 0.6–1.3)
ERYTHROCYTE [DISTWIDTH] IN BLOOD BY AUTOMATED COUNT: 15.4 % (ref 11.6–14.5)
ETHANOL SERPL-MCNC: <11 MG/DL (ref 0–0.08)
GLOBULIN SER CALC-MCNC: 4.3 G/DL (ref 2–4)
GLUCOSE SERPL-MCNC: 206 MG/DL (ref 74–108)
HCT VFR BLD AUTO: 47.7 % (ref 35–45)
HGB BLD-MCNC: 15.1 G/DL (ref 12–16)
LACTATE BLD-SCNC: 9.57 MMOL/L (ref 0.4–2)
MCH RBC QN AUTO: 29.7 PG (ref 24–34)
MCHC RBC AUTO-ENTMCNC: 31.7 G/DL (ref 31–37)
MCV RBC AUTO: 93.9 FL (ref 78–100)
NRBC # BLD: 0 K/UL (ref 0–0.01)
NRBC BLD-RTO: 0 PER 100 WBC
PLATELET # BLD AUTO: 287 K/UL (ref 135–420)
PMV BLD AUTO: 11.8 FL (ref 9.2–11.8)
POTASSIUM SERPL-SCNC: 2.9 MMOL/L (ref 3.5–5.5)
PROT SERPL-MCNC: 8.1 G/DL (ref 6.4–8.2)
RBC # BLD AUTO: 5.08 M/UL (ref 4.2–5.3)
SALICYLATES SERPL-MCNC: <0.5 MG/DL (ref 3–10)
SODIUM SERPL-SCNC: 140 MMOL/L (ref 136–145)
WBC # BLD AUTO: 19.3 K/UL (ref 4.6–13.2)

## 2025-08-21 PROCEDURE — 2580000003 HC RX 258: Performed by: STUDENT IN AN ORGANIZED HEALTH CARE EDUCATION/TRAINING PROGRAM

## 2025-08-21 PROCEDURE — 95706 EEG WO VID 2-12HR INTMT MNTR: CPT

## 2025-08-21 PROCEDURE — 93005 ELECTROCARDIOGRAM TRACING: CPT | Performed by: STUDENT IN AN ORGANIZED HEALTH CARE EDUCATION/TRAINING PROGRAM

## 2025-08-21 PROCEDURE — 87040 BLOOD CULTURE FOR BACTERIA: CPT

## 2025-08-21 PROCEDURE — 87077 CULTURE AEROBIC IDENTIFY: CPT

## 2025-08-21 PROCEDURE — 82077 ASSAY SPEC XCP UR&BREATH IA: CPT

## 2025-08-21 PROCEDURE — 80053 COMPREHEN METABOLIC PANEL: CPT

## 2025-08-21 PROCEDURE — 85027 COMPLETE CBC AUTOMATED: CPT

## 2025-08-21 PROCEDURE — 99285 EMERGENCY DEPT VISIT HI MDM: CPT

## 2025-08-21 PROCEDURE — 96376 TX/PRO/DX INJ SAME DRUG ADON: CPT

## 2025-08-21 PROCEDURE — 96375 TX/PRO/DX INJ NEW DRUG ADDON: CPT

## 2025-08-21 PROCEDURE — 2720000010 HC SURG SUPPLY STERILE

## 2025-08-21 PROCEDURE — 80143 DRUG ASSAY ACETAMINOPHEN: CPT

## 2025-08-21 PROCEDURE — 6360000002 HC RX W HCPCS

## 2025-08-21 PROCEDURE — 6360000002 HC RX W HCPCS: Performed by: STUDENT IN AN ORGANIZED HEALTH CARE EDUCATION/TRAINING PROGRAM

## 2025-08-21 PROCEDURE — 87186 SC STD MICRODIL/AGAR DIL: CPT

## 2025-08-21 PROCEDURE — 83605 ASSAY OF LACTIC ACID: CPT

## 2025-08-21 PROCEDURE — 87154 CUL TYP ID BLD PTHGN 6+ TRGT: CPT

## 2025-08-21 PROCEDURE — 70450 CT HEAD/BRAIN W/O DYE: CPT

## 2025-08-21 PROCEDURE — 80179 DRUG ASSAY SALICYLATE: CPT

## 2025-08-21 PROCEDURE — 71045 X-RAY EXAM CHEST 1 VIEW: CPT

## 2025-08-21 PROCEDURE — 1100000000 HC RM PRIVATE

## 2025-08-21 PROCEDURE — 96365 THER/PROPH/DIAG IV INF INIT: CPT

## 2025-08-21 PROCEDURE — 2500000003 HC RX 250 WO HCPCS: Performed by: STUDENT IN AN ORGANIZED HEALTH CARE EDUCATION/TRAINING PROGRAM

## 2025-08-21 RX ORDER — NICARDIPINE HYDROCHLORIDE 0.1 MG/ML
.5-15 INJECTION INTRAVENOUS CONTINUOUS
Status: DISCONTINUED | OUTPATIENT
Start: 2025-08-21 | End: 2025-08-22

## 2025-08-21 RX ORDER — LEVETIRACETAM 500 MG/5ML
500 INJECTION, SOLUTION, CONCENTRATE INTRAVENOUS EVERY 12 HOURS
Status: DISCONTINUED | OUTPATIENT
Start: 2025-08-21 | End: 2025-08-22

## 2025-08-21 RX ORDER — ONDANSETRON 2 MG/ML
4 INJECTION INTRAMUSCULAR; INTRAVENOUS
Status: COMPLETED | OUTPATIENT
Start: 2025-08-21 | End: 2025-08-21

## 2025-08-21 RX ORDER — VANCOMYCIN 2 G/400ML
2000 INJECTION, SOLUTION INTRAVENOUS ONCE
Status: COMPLETED | OUTPATIENT
Start: 2025-08-21 | End: 2025-08-21

## 2025-08-21 RX ORDER — LEVETIRACETAM 500 MG/5ML
1500 INJECTION, SOLUTION, CONCENTRATE INTRAVENOUS ONCE
Status: COMPLETED | OUTPATIENT
Start: 2025-08-21 | End: 2025-08-21

## 2025-08-21 RX ORDER — LABETALOL HYDROCHLORIDE 5 MG/ML
10 INJECTION, SOLUTION INTRAVENOUS
Status: COMPLETED | OUTPATIENT
Start: 2025-08-21 | End: 2025-08-21

## 2025-08-21 RX ORDER — IODIXANOL 320 MG/ML
100 INJECTION, SOLUTION INTRAVASCULAR
Status: DISCONTINUED | OUTPATIENT
Start: 2025-08-21 | End: 2025-08-27 | Stop reason: HOSPADM

## 2025-08-21 RX ORDER — POTASSIUM CHLORIDE 7.45 MG/ML
10 INJECTION INTRAVENOUS
Status: COMPLETED | OUTPATIENT
Start: 2025-08-21 | End: 2025-08-21

## 2025-08-21 RX ADMIN — POTASSIUM CHLORIDE 10 MEQ: 7.46 INJECTION, SOLUTION INTRAVENOUS at 21:01

## 2025-08-21 RX ADMIN — CEFEPIME 2000 MG: 2 INJECTION, POWDER, FOR SOLUTION INTRAVENOUS at 21:17

## 2025-08-21 RX ADMIN — SODIUM CHLORIDE 1848 ML: 0.9 INJECTION, SOLUTION INTRAVENOUS at 19:58

## 2025-08-21 RX ADMIN — VANCOMYCIN 2000 MG: 2 INJECTION, SOLUTION INTRAVENOUS at 21:20

## 2025-08-21 RX ADMIN — LEVETIRACETAM 1500 MG: 100 INJECTION INTRAVENOUS at 18:32

## 2025-08-21 RX ADMIN — LEVETIRACETAM 500 MG: 100 INJECTION INTRAVENOUS at 22:28

## 2025-08-21 RX ADMIN — POTASSIUM CHLORIDE 10 MEQ: 7.46 INJECTION, SOLUTION INTRAVENOUS at 19:57

## 2025-08-21 RX ADMIN — LABETALOL HYDROCHLORIDE 10 MG: 5 INJECTION, SOLUTION INTRAVENOUS at 19:40

## 2025-08-21 RX ADMIN — NICARDIPINE HYDROCHLORIDE 5 MG/HR: 0.1 INJECTION INTRAVENOUS at 23:14

## 2025-08-21 RX ADMIN — ONDANSETRON 4 MG: 2 INJECTION, SOLUTION INTRAMUSCULAR; INTRAVENOUS at 19:56

## 2025-08-21 ASSESSMENT — LIFESTYLE VARIABLES
HOW MANY STANDARD DRINKS CONTAINING ALCOHOL DO YOU HAVE ON A TYPICAL DAY: PATIENT DOES NOT DRINK
HOW OFTEN DO YOU HAVE A DRINK CONTAINING ALCOHOL: NEVER

## 2025-08-21 ASSESSMENT — PAIN SCALES - WONG BAKER: WONGBAKER_NUMERICALRESPONSE: NO HURT

## 2025-08-22 ENCOUNTER — APPOINTMENT (OUTPATIENT)
Facility: HOSPITAL | Age: 80
DRG: 871 | End: 2025-08-22
Payer: MEDICARE

## 2025-08-22 PROBLEM — R78.81 GRAM-POSITIVE BACTEREMIA: Status: ACTIVE | Noted: 2025-08-22

## 2025-08-22 PROBLEM — A41.9 SEPSIS (HCC): Status: ACTIVE | Noted: 2025-08-22

## 2025-08-22 PROBLEM — R41.82 ALTERED MENTAL STATUS: Status: ACTIVE | Noted: 2025-08-22

## 2025-08-22 LAB
ACB COMPLEX DNA BLD POS QL NAA+NON-PROBE: NOT DETECTED
ACCESSION NUMBER, LLC1M: ABNORMAL
ALBUMIN SERPL-MCNC: 3.6 G/DL (ref 3.4–5)
ALBUMIN/GLOB SERPL: 0.8 (ref 0.8–1.7)
ALP SERPL-CCNC: 113 U/L (ref 45–117)
ALT SERPL-CCNC: 15 U/L (ref 10–35)
AMPHET UR QL SCN: NEGATIVE
ANION GAP SERPL CALC-SCNC: 20 MMOL/L (ref 3–18)
APPEARANCE UR: CLEAR
APTT PPP: 141.8 SEC (ref 21.7–34.2)
APTT PPP: 24.5 SEC (ref 21.7–34.2)
AST SERPL-CCNC: 53 U/L (ref 10–38)
B FRAGILIS DNA BLD POS QL NAA+NON-PROBE: NOT DETECTED
B PERT DNA SPEC QL NAA+PROBE: NOT DETECTED
BACTERIA URNS QL MICRO: NEGATIVE /HPF
BARBITURATES UR QL SCN: NEGATIVE
BENZODIAZ UR QL: NEGATIVE
BILIRUB DIRECT SERPL-MCNC: 0.2 MG/DL (ref 0–0.2)
BILIRUB SERPL-MCNC: 0.8 MG/DL (ref 0.2–1)
BILIRUB UR QL: NEGATIVE
BIOFIRE TEST COMMENT: ABNORMAL
BLACTX-M ISLT/SPM QL: NOT DETECTED
BLAIMP ISLT/SPM QL: NOT DETECTED
BLAKPC ISLT/SPM QL: NOT DETECTED
BLAOXA-48-LIKE ISLT/SPM QL: NOT DETECTED
BLAVIM ISLT/SPM QL: NOT DETECTED
BORDETELLA PARAPERTUSSIS BY PCR: NOT DETECTED
BUN SERPL-MCNC: 16 MG/DL (ref 6–23)
BUN/CREAT SERPL: 14 (ref 12–20)
C ALBICANS DNA BLD POS QL NAA+NON-PROBE: NOT DETECTED
C AURIS DNA BLD POS QL NAA+NON-PROBE: NOT DETECTED
C GATTII+NEOFOR DNA BLD POS QL NAA+N-PRB: NOT DETECTED
C GLABRATA DNA BLD POS QL NAA+NON-PROBE: NOT DETECTED
C KRUSEI DNA BLD POS QL NAA+NON-PROBE: NOT DETECTED
C PARAP DNA BLD POS QL NAA+NON-PROBE: NOT DETECTED
C PNEUM DNA SPEC QL NAA+PROBE: NOT DETECTED
C TROPICLS DNA BLD POS QL NAA+NON-PROBE: NOT DETECTED
CALCIUM SERPL-MCNC: 9.5 MG/DL (ref 8.5–10.1)
CANNABINOIDS UR QL SCN: POSITIVE
CHLORIDE SERPL-SCNC: 106 MMOL/L (ref 98–107)
CK SERPL-CCNC: 3900 U/L (ref 26–192)
CO2 SERPL-SCNC: 17 MMOL/L (ref 21–32)
COCAINE UR QL SCN: NEGATIVE
COLISTIN RES MCR-1 ISLT/SPM QL: NOT DETECTED
COLOR UR: YELLOW
CREAT SERPL-MCNC: 1.13 MG/DL (ref 0.6–1.3)
E CLOAC COMP DNA BLD POS NAA+NON-PROBE: NOT DETECTED
E COLI DNA BLD POS QL NAA+NON-PROBE: NOT DETECTED
E FAECALIS DNA BLD POS QL NAA+NON-PROBE: NOT DETECTED
E FAECIUM DNA BLD POS QL NAA+NON-PROBE: NOT DETECTED
ECHO AO ASC DIAM: 3.9 CM
ECHO AO ASCENDING AORTA INDEX: 2.03 CM/M2
ECHO AO ROOT DIAM: 4.4 CM
ECHO AO ROOT INDEX: 2.29 CM/M2
ECHO AV PEAK GRADIENT: 3 MMHG
ECHO AV PEAK VELOCITY: 0.9 M/S
ECHO AV VELOCITY RATIO: 0.78
ECHO BSA: 1.95 M2
ECHO EST RA PRESSURE: 3 MMHG
ECHO IVC INSP: 0.5 CM
ECHO IVC PROX: 1 CM
ECHO LA DIAMETER INDEX: 1.25 CM/M2
ECHO LA DIAMETER: 2.4 CM
ECHO LA TO AORTIC ROOT RATIO: 0.55
ECHO LA VOL A-L A2C: 37 ML (ref 22–52)
ECHO LA VOL A-L A4C: 55 ML (ref 22–52)
ECHO LA VOL BP: 51 ML (ref 22–52)
ECHO LA VOL MOD A2C: 35 ML (ref 22–52)
ECHO LA VOL MOD A4C: 49 ML (ref 22–52)
ECHO LA VOL/BSA BIPLANE: 27 ML/M2 (ref 16–34)
ECHO LA VOLUME AREA LENGTH: 57 ML
ECHO LA VOLUME INDEX A-L A2C: 19 ML/M2 (ref 16–34)
ECHO LA VOLUME INDEX A-L A4C: 29 ML/M2 (ref 16–34)
ECHO LA VOLUME INDEX AREA LENGTH: 30 ML/M2 (ref 16–34)
ECHO LA VOLUME INDEX MOD A2C: 18 ML/M2 (ref 16–34)
ECHO LA VOLUME INDEX MOD A4C: 26 ML/M2 (ref 16–34)
ECHO LV EDV A2C: 56 ML
ECHO LV EDV A4C: 61 ML
ECHO LV EDV BP: 62 ML (ref 56–104)
ECHO LV EDV INDEX A4C: 32 ML/M2
ECHO LV EDV INDEX BP: 32 ML/M2
ECHO LV EDV NDEX A2C: 29 ML/M2
ECHO LV EF PHYSICIAN: 30 %
ECHO LV EJECTION FRACTION A2C: 11 %
ECHO LV EJECTION FRACTION A4C: 42 %
ECHO LV EJECTION FRACTION BIPLANE: 31 % (ref 55–100)
ECHO LV ESV A2C: 49 ML
ECHO LV ESV A4C: 36 ML
ECHO LV ESV BP: 43 ML (ref 19–49)
ECHO LV ESV INDEX A2C: 26 ML/M2
ECHO LV ESV INDEX A4C: 19 ML/M2
ECHO LV ESV INDEX BP: 22 ML/M2
ECHO LV FRACTIONAL SHORTENING: 13 % (ref 28–44)
ECHO LV INTERNAL DIMENSION DIASTOLE INDEX: 2.81 CM/M2
ECHO LV INTERNAL DIMENSION DIASTOLIC MMODE: 4.4 CM (ref 3.9–5.3)
ECHO LV INTERNAL DIMENSION DIASTOLIC: 5.4 CM (ref 3.9–5.3)
ECHO LV INTERNAL DIMENSION SYSTOLIC INDEX: 2.45 CM/M2
ECHO LV INTERNAL DIMENSION SYSTOLIC MMODE: 3.6 CM
ECHO LV INTERNAL DIMENSION SYSTOLIC: 4.7 CM
ECHO LV IVSD MMODE: 1 CM (ref 0.6–0.9)
ECHO LV IVSD: 1.1 CM (ref 0.6–0.9)
ECHO LV MASS 2D: 234.8 G (ref 67–162)
ECHO LV MASS INDEX 2D: 122.3 G/M2 (ref 43–95)
ECHO LV POSTERIOR WALL DIASTOLIC MMODE: 1 CM (ref 0.6–0.9)
ECHO LV POSTERIOR WALL DIASTOLIC: 1.1 CM (ref 0.6–0.9)
ECHO LV RELATIVE WALL THICKNESS RATIO: 0.41
ECHO LVOT PEAK GRADIENT: 2 MMHG
ECHO LVOT PEAK VELOCITY: 0.7 M/S
ECHO PV MAX VELOCITY: 0.7 M/S
ECHO PV PEAK GRADIENT: 2 MMHG
ECHO RA AREA 4C: 21 CM2
ECHO RIGHT VENTRICULAR SYSTOLIC PRESSURE (RVSP): 31 MMHG
ECHO RV BASAL DIMENSION: 3.5 CM
ECHO TV REGURGITANT MAX VELOCITY: 2.65 M/S
ECHO TV REGURGITANT PEAK GRADIENT: 28 MMHG
EKG DIAGNOSIS: NORMAL
EKG Q-T INTERVAL: 368 MS
EKG QRS DURATION: 140 MS
EKG QTC CALCULATION (BAZETT): 520 MS
EKG R AXIS: -65 DEGREES
EKG T AXIS: 131 DEGREES
EKG VENTRICULAR RATE: 120 BPM
ENTEROBACTERALES DNA BLD POS NAA+N-PRB: DETECTED
EPITH CASTS URNS QL MICRO: NORMAL /LPF (ref 0–5)
EST. AVERAGE GLUCOSE BLD GHB EST-MCNC: 127 MG/DL
FENTANYL: NEGATIVE
FLUAV SUBTYP SPEC NAA+PROBE: NOT DETECTED
FLUBV RNA SPEC QL NAA+PROBE: NOT DETECTED
FOLATE SERPL-MCNC: >20 NG/ML (ref 4.6–34.8)
GLOBULIN SER CALC-MCNC: 4.3 G/DL (ref 2–4)
GLUCOSE BLD STRIP.AUTO-MCNC: 99 MG/DL (ref 70–110)
GLUCOSE SERPL-MCNC: 120 MG/DL (ref 74–108)
GLUCOSE UR STRIP.AUTO-MCNC: 100 MG/DL
GP B STREP DNA BLD POS QL NAA+NON-PROBE: NOT DETECTED
HADV DNA SPEC QL NAA+PROBE: NOT DETECTED
HAEM INFLU DNA BLD POS QL NAA+NON-PROBE: NOT DETECTED
HBA1C MFR BLD: 6 % (ref 4.2–5.6)
HCOV 229E RNA SPEC QL NAA+PROBE: NOT DETECTED
HCOV HKU1 RNA SPEC QL NAA+PROBE: NOT DETECTED
HCOV NL63 RNA SPEC QL NAA+PROBE: NOT DETECTED
HCOV OC43 RNA SPEC QL NAA+PROBE: NOT DETECTED
HGB UR QL STRIP: ABNORMAL
HMPV RNA SPEC QL NAA+PROBE: NOT DETECTED
HPIV1 RNA SPEC QL NAA+PROBE: NOT DETECTED
HPIV2 RNA SPEC QL NAA+PROBE: NOT DETECTED
HPIV3 RNA SPEC QL NAA+PROBE: NOT DETECTED
HPIV4 RNA SPEC QL NAA+PROBE: NOT DETECTED
K OXYTOCA DNA BLD POS QL NAA+NON-PROBE: NOT DETECTED
KETONES UR QL STRIP.AUTO: ABNORMAL MG/DL
KLEBSIELLA SP DNA BLD POS QL NAA+NON-PRB: DETECTED
KLEBSIELLA SP DNA BLD POS QL NAA+NON-PRB: NOT DETECTED
L MONOCYTOG DNA BLD POS QL NAA+NON-PROBE: NOT DETECTED
L PNEUMO AG UR QL: NEGATIVE
LACTATE BLD-SCNC: 2.29 MMOL/L (ref 0.4–2)
LACTATE SERPL-SCNC: 3.6 MMOL/L (ref 0.4–2)
LEUKOCYTE ESTERASE UR QL STRIP.AUTO: NEGATIVE
Lab: ABNORMAL
M PNEUMO DNA SPEC QL NAA+PROBE: NOT DETECTED
METHADONE UR QL: NEGATIVE
N MEN DNA BLD POS QL NAA+NON-PROBE: NOT DETECTED
NEGATIVE CONTROL: NEGATIVE
NITRITE UR QL STRIP.AUTO: NEGATIVE
NT PRO BNP: 3745 PG/ML (ref 36–1800)
OPIATES UR QL: NEGATIVE
OXYCODONE UR QL SCN: NEGATIVE
P AERUGINOSA DNA BLD POS NAA+NON-PROBE: NOT DETECTED
PH UR STRIP: 5.5 (ref 5–8)
PH, URINE: 5 (ref 4.6–8)
POSITIVE CONTROL: POSITIVE
POTASSIUM SERPL-SCNC: 3.1 MMOL/L (ref 3.5–5.5)
PROCALCITONIN SERPL-MCNC: 0.52 NG/ML
PROT SERPL-MCNC: 7.9 G/DL (ref 6.4–8.2)
PROT UR STRIP-MCNC: 300 MG/DL
PROTEUS SP DNA BLD POS QL NAA+NON-PROBE: NOT DETECTED
RBC #/AREA URNS HPF: NORMAL /HPF (ref 0–5)
RESISTANT GENE NDM BY PCR: NOT DETECTED
RESISTANT GENE TARGETS: ABNORMAL
RSV RNA SPEC QL NAA+PROBE: NOT DETECTED
RV+EV RNA SPEC QL NAA+PROBE: NOT DETECTED
S AUREUS DNA BLD POS QL NAA+NON-PROBE: NOT DETECTED
S AUREUS+CONS DNA BLD POS NAA+NON-PROBE: NOT DETECTED
S EPIDERMIDIS DNA BLD POS QL NAA+NON-PRB: NOT DETECTED
S LUGDUNENSIS DNA BLD POS QL NAA+NON-PRB: NOT DETECTED
S MALTOPHILIA DNA BLD POS QL NAA+NON-PRB: NOT DETECTED
S MARCESCENS DNA BLD POS NAA+NON-PROBE: NOT DETECTED
S PNEUM AG UR QL IA.RAPID: NEGATIVE
S PNEUM DNA BLD POS QL NAA+NON-PROBE: NOT DETECTED
S PYO DNA BLD POS QL NAA+NON-PROBE: NOT DETECTED
SALMONELLA DNA BLD POS QL NAA+NON-PROBE: NOT DETECTED
SARS-COV-2 RNA RESP QL NAA+PROBE: NOT DETECTED
SODIUM SERPL-SCNC: 142 MMOL/L (ref 136–145)
SP GR UR REFRACTOMETRY: 1.03 (ref 1–1.03)
STREPTOCOCCUS DNA BLD POS NAA+NON-PROBE: NOT DETECTED
TSH, 3RD GENERATION: 0.22 UIU/ML (ref 0.27–4.2)
UROBILINOGEN UR QL STRIP.AUTO: 0.2 EU/DL (ref 0.2–1)
VIT B12 SERPL-MCNC: 542 PG/ML (ref 211–911)
WBC URNS QL MICRO: NEGATIVE /HPF (ref 0–5)

## 2025-08-22 PROCEDURE — 6360000002 HC RX W HCPCS: Performed by: STUDENT IN AN ORGANIZED HEALTH CARE EDUCATION/TRAINING PROGRAM

## 2025-08-22 PROCEDURE — 87899 AGENT NOS ASSAY W/OPTIC: CPT

## 2025-08-22 PROCEDURE — 82746 ASSAY OF FOLIC ACID SERUM: CPT

## 2025-08-22 PROCEDURE — 36410 VNPNXR 3YR/> PHY/QHP DX/THER: CPT

## 2025-08-22 PROCEDURE — 6360000002 HC RX W HCPCS: Performed by: INTERNAL MEDICINE

## 2025-08-22 PROCEDURE — 99222 1ST HOSP IP/OBS MODERATE 55: CPT | Performed by: INTERNAL MEDICINE

## 2025-08-22 PROCEDURE — 2500000003 HC RX 250 WO HCPCS: Performed by: INTERNAL MEDICINE

## 2025-08-22 PROCEDURE — 05HA33Z INSERTION OF INFUSION DEVICE INTO LEFT BRACHIAL VEIN, PERCUTANEOUS APPROACH: ICD-10-PCS | Performed by: STUDENT IN AN ORGANIZED HEALTH CARE EDUCATION/TRAINING PROGRAM

## 2025-08-22 PROCEDURE — 2580000003 HC RX 258: Performed by: INTERNAL MEDICINE

## 2025-08-22 PROCEDURE — 87040 BLOOD CULTURE FOR BACTERIA: CPT

## 2025-08-22 PROCEDURE — 80076 HEPATIC FUNCTION PANEL: CPT

## 2025-08-22 PROCEDURE — 84145 PROCALCITONIN (PCT): CPT

## 2025-08-22 PROCEDURE — 80307 DRUG TEST PRSMV CHEM ANLYZR: CPT

## 2025-08-22 PROCEDURE — 84443 ASSAY THYROID STIM HORMONE: CPT

## 2025-08-22 PROCEDURE — 87086 URINE CULTURE/COLONY COUNT: CPT

## 2025-08-22 PROCEDURE — 6360000002 HC RX W HCPCS: Performed by: HEALTH CARE PROVIDER

## 2025-08-22 PROCEDURE — 82550 ASSAY OF CK (CPK): CPT

## 2025-08-22 PROCEDURE — 6360000004 HC RX CONTRAST MEDICATION: Performed by: INTERNAL MEDICINE

## 2025-08-22 PROCEDURE — 99222 1ST HOSP IP/OBS MODERATE 55: CPT | Performed by: STUDENT IN AN ORGANIZED HEALTH CARE EDUCATION/TRAINING PROGRAM

## 2025-08-22 PROCEDURE — 2500000003 HC RX 250 WO HCPCS: Performed by: STUDENT IN AN ORGANIZED HEALTH CARE EDUCATION/TRAINING PROGRAM

## 2025-08-22 PROCEDURE — 95819 EEG AWAKE AND ASLEEP: CPT

## 2025-08-22 PROCEDURE — 2580000003 HC RX 258: Performed by: STUDENT IN AN ORGANIZED HEALTH CARE EDUCATION/TRAINING PROGRAM

## 2025-08-22 PROCEDURE — 94761 N-INVAS EAR/PLS OXIMETRY MLT: CPT

## 2025-08-22 PROCEDURE — 82962 GLUCOSE BLOOD TEST: CPT

## 2025-08-22 PROCEDURE — 51798 US URINE CAPACITY MEASURE: CPT

## 2025-08-22 PROCEDURE — 36415 COLL VENOUS BLD VENIPUNCTURE: CPT

## 2025-08-22 PROCEDURE — 71260 CT THORAX DX C+: CPT

## 2025-08-22 PROCEDURE — XX20X89 MONITORING OF BRAIN ELECTRICAL ACTIVITY, COMPUTER-AIDED DETECTION AND NOTIFICATION, NEW TECHNOLOGY GROUP 9: ICD-10-PCS | Performed by: PSYCHIATRY & NEUROLOGY

## 2025-08-22 PROCEDURE — 93306 TTE W/DOPPLER COMPLETE: CPT

## 2025-08-22 PROCEDURE — 82607 VITAMIN B-12: CPT

## 2025-08-22 PROCEDURE — 0202U NFCT DS 22 TRGT SARS-COV-2: CPT

## 2025-08-22 PROCEDURE — 2000000000 HC ICU R&B

## 2025-08-22 PROCEDURE — 83880 ASSAY OF NATRIURETIC PEPTIDE: CPT

## 2025-08-22 PROCEDURE — 93010 ELECTROCARDIOGRAM REPORT: CPT | Performed by: INTERNAL MEDICINE

## 2025-08-22 PROCEDURE — 81001 URINALYSIS AUTO W/SCOPE: CPT

## 2025-08-22 PROCEDURE — 80048 BASIC METABOLIC PNL TOTAL CA: CPT

## 2025-08-22 PROCEDURE — 83605 ASSAY OF LACTIC ACID: CPT

## 2025-08-22 PROCEDURE — 6360000002 HC RX W HCPCS: Performed by: PSYCHIATRY & NEUROLOGY

## 2025-08-22 PROCEDURE — 93306 TTE W/DOPPLER COMPLETE: CPT | Performed by: INTERNAL MEDICINE

## 2025-08-22 PROCEDURE — 51702 INSERT TEMP BLADDER CATH: CPT

## 2025-08-22 PROCEDURE — 85730 THROMBOPLASTIN TIME PARTIAL: CPT

## 2025-08-22 PROCEDURE — 83036 HEMOGLOBIN GLYCOSYLATED A1C: CPT

## 2025-08-22 RX ORDER — SODIUM CHLORIDE 9 MG/ML
INJECTION, SOLUTION INTRAVENOUS PRN
Status: DISCONTINUED | OUTPATIENT
Start: 2025-08-22 | End: 2025-08-27 | Stop reason: HOSPADM

## 2025-08-22 RX ORDER — LIDOCAINE HYDROCHLORIDE 20 MG/ML
JELLY TOPICAL
Status: DISCONTINUED | OUTPATIENT
Start: 2025-08-22 | End: 2025-08-27 | Stop reason: HOSPADM

## 2025-08-22 RX ORDER — HYDRALAZINE HYDROCHLORIDE 20 MG/ML
10 INJECTION INTRAMUSCULAR; INTRAVENOUS EVERY 4 HOURS PRN
Status: DISCONTINUED | OUTPATIENT
Start: 2025-08-22 | End: 2025-08-23

## 2025-08-22 RX ORDER — LORAZEPAM 2 MG/ML
1 INJECTION INTRAMUSCULAR ONCE
Status: COMPLETED | OUTPATIENT
Start: 2025-08-22 | End: 2025-08-22

## 2025-08-22 RX ORDER — METOPROLOL SUCCINATE 50 MG/1
50 TABLET, EXTENDED RELEASE ORAL DAILY
Status: ON HOLD | COMMUNITY
End: 2025-08-26 | Stop reason: HOSPADM

## 2025-08-22 RX ORDER — HEPARIN SODIUM 1000 [USP'U]/ML
4000 INJECTION, SOLUTION INTRAVENOUS; SUBCUTANEOUS ONCE
Status: COMPLETED | OUTPATIENT
Start: 2025-08-22 | End: 2025-08-22

## 2025-08-22 RX ORDER — ONDANSETRON 4 MG/1
4 TABLET, ORALLY DISINTEGRATING ORAL EVERY 8 HOURS PRN
Status: DISCONTINUED | OUTPATIENT
Start: 2025-08-22 | End: 2025-08-27 | Stop reason: HOSPADM

## 2025-08-22 RX ORDER — HYDRALAZINE HYDROCHLORIDE 20 MG/ML
10 INJECTION INTRAMUSCULAR; INTRAVENOUS EVERY 6 HOURS PRN
Status: DISCONTINUED | OUTPATIENT
Start: 2025-08-22 | End: 2025-08-22

## 2025-08-22 RX ORDER — BENZONATATE 100 MG/1
200 CAPSULE ORAL 3 TIMES DAILY PRN
Status: DISCONTINUED | OUTPATIENT
Start: 2025-08-22 | End: 2025-08-27 | Stop reason: HOSPADM

## 2025-08-22 RX ORDER — TORSEMIDE 20 MG/1
20 TABLET ORAL 2 TIMES DAILY
Status: ON HOLD | COMMUNITY
End: 2025-08-26 | Stop reason: HOSPADM

## 2025-08-22 RX ORDER — HEPARIN SODIUM 1000 [USP'U]/ML
4000 INJECTION, SOLUTION INTRAVENOUS; SUBCUTANEOUS PRN
Status: DISCONTINUED | OUTPATIENT
Start: 2025-08-22 | End: 2025-08-25

## 2025-08-22 RX ORDER — ONDANSETRON 2 MG/ML
4 INJECTION INTRAMUSCULAR; INTRAVENOUS EVERY 6 HOURS PRN
Status: DISCONTINUED | OUTPATIENT
Start: 2025-08-22 | End: 2025-08-27 | Stop reason: HOSPADM

## 2025-08-22 RX ORDER — MAGNESIUM SULFATE IN WATER 40 MG/ML
2000 INJECTION, SOLUTION INTRAVENOUS PRN
Status: DISCONTINUED | OUTPATIENT
Start: 2025-08-22 | End: 2025-08-27 | Stop reason: HOSPADM

## 2025-08-22 RX ORDER — AMLODIPINE BESYLATE 5 MG/1
10 TABLET ORAL DAILY
Status: DISCONTINUED | OUTPATIENT
Start: 2025-08-22 | End: 2025-08-27 | Stop reason: HOSPADM

## 2025-08-22 RX ORDER — NITROGLYCERIN 0.4 MG/1
0.4 TABLET SUBLINGUAL PRN
Status: ON HOLD | COMMUNITY
End: 2025-08-26 | Stop reason: HOSPADM

## 2025-08-22 RX ORDER — SODIUM CHLORIDE, SODIUM LACTATE, POTASSIUM CHLORIDE, CALCIUM CHLORIDE 600; 310; 30; 20 MG/100ML; MG/100ML; MG/100ML; MG/100ML
INJECTION, SOLUTION INTRAVENOUS CONTINUOUS
Status: DISCONTINUED | OUTPATIENT
Start: 2025-08-22 | End: 2025-08-22

## 2025-08-22 RX ORDER — IOPAMIDOL 612 MG/ML
100 INJECTION, SOLUTION INTRAVASCULAR
Status: COMPLETED | OUTPATIENT
Start: 2025-08-22 | End: 2025-08-22

## 2025-08-22 RX ORDER — POLYETHYLENE GLYCOL 3350 17 G/17G
17 POWDER, FOR SOLUTION ORAL DAILY PRN
Status: DISCONTINUED | OUTPATIENT
Start: 2025-08-22 | End: 2025-08-27 | Stop reason: HOSPADM

## 2025-08-22 RX ORDER — HEPARIN SODIUM 10000 [USP'U]/100ML
5-30 INJECTION, SOLUTION INTRAVENOUS CONTINUOUS
Status: DISCONTINUED | OUTPATIENT
Start: 2025-08-22 | End: 2025-08-25

## 2025-08-22 RX ORDER — ATORVASTATIN CALCIUM 20 MG/1
40 TABLET, FILM COATED ORAL DAILY
Status: DISCONTINUED | OUTPATIENT
Start: 2025-08-22 | End: 2025-08-23

## 2025-08-22 RX ORDER — CARVEDILOL 12.5 MG/1
12.5 TABLET ORAL 2 TIMES DAILY WITH MEALS
Status: DISCONTINUED | OUTPATIENT
Start: 2025-08-22 | End: 2025-08-27 | Stop reason: HOSPADM

## 2025-08-22 RX ORDER — SODIUM CHLORIDE 0.9 % (FLUSH) 0.9 %
5-40 SYRINGE (ML) INJECTION EVERY 12 HOURS SCHEDULED
Status: DISCONTINUED | OUTPATIENT
Start: 2025-08-22 | End: 2025-08-27 | Stop reason: HOSPADM

## 2025-08-22 RX ORDER — FUROSEMIDE 20 MG/1
40 TABLET ORAL DAILY
Status: DISCONTINUED | OUTPATIENT
Start: 2025-08-22 | End: 2025-08-27 | Stop reason: HOSPADM

## 2025-08-22 RX ORDER — LORAZEPAM 2 MG/ML
1 INJECTION INTRAMUSCULAR ONCE
Status: DISCONTINUED | OUTPATIENT
Start: 2025-08-22 | End: 2025-08-22

## 2025-08-22 RX ORDER — METOPROLOL TARTRATE 1 MG/ML
5 INJECTION, SOLUTION INTRAVENOUS EVERY 6 HOURS PRN
Status: DISCONTINUED | OUTPATIENT
Start: 2025-08-22 | End: 2025-08-27 | Stop reason: HOSPADM

## 2025-08-22 RX ORDER — POTASSIUM CHLORIDE 1500 MG/1
40 TABLET, EXTENDED RELEASE ORAL PRN
Status: DISCONTINUED | OUTPATIENT
Start: 2025-08-22 | End: 2025-08-27 | Stop reason: HOSPADM

## 2025-08-22 RX ORDER — ACETAMINOPHEN 650 MG/1
650 SUPPOSITORY RECTAL EVERY 6 HOURS PRN
Status: DISCONTINUED | OUTPATIENT
Start: 2025-08-22 | End: 2025-08-27 | Stop reason: HOSPADM

## 2025-08-22 RX ORDER — LEVETIRACETAM 500 MG/5ML
750 INJECTION, SOLUTION, CONCENTRATE INTRAVENOUS EVERY 12 HOURS
Status: DISCONTINUED | OUTPATIENT
Start: 2025-08-22 | End: 2025-08-26

## 2025-08-22 RX ORDER — HEPARIN SODIUM 1000 [USP'U]/ML
2000 INJECTION, SOLUTION INTRAVENOUS; SUBCUTANEOUS PRN
Status: DISCONTINUED | OUTPATIENT
Start: 2025-08-22 | End: 2025-08-25

## 2025-08-22 RX ORDER — ACETAMINOPHEN 325 MG/1
650 TABLET ORAL EVERY 6 HOURS PRN
Status: DISCONTINUED | OUTPATIENT
Start: 2025-08-22 | End: 2025-08-27 | Stop reason: HOSPADM

## 2025-08-22 RX ORDER — LEVETIRACETAM 500 MG/5ML
2000 INJECTION, SOLUTION, CONCENTRATE INTRAVENOUS ONCE
Status: COMPLETED | OUTPATIENT
Start: 2025-08-22 | End: 2025-08-22

## 2025-08-22 RX ORDER — LANOLIN ALCOHOL/MO/W.PET/CERES
400 CREAM (GRAM) TOPICAL DAILY
Status: DISCONTINUED | OUTPATIENT
Start: 2025-08-22 | End: 2025-08-27 | Stop reason: HOSPADM

## 2025-08-22 RX ORDER — DIAZEPAM 10 MG/2ML
5 INJECTION, SOLUTION INTRAMUSCULAR; INTRAVENOUS EVERY 4 HOURS PRN
Status: DISCONTINUED | OUTPATIENT
Start: 2025-08-22 | End: 2025-08-27 | Stop reason: HOSPADM

## 2025-08-22 RX ORDER — SACUBITRIL AND VALSARTAN 49; 51 MG/1; MG/1
1 TABLET ORAL 2 TIMES DAILY
Status: DISCONTINUED | OUTPATIENT
Start: 2025-08-22 | End: 2025-08-27 | Stop reason: HOSPADM

## 2025-08-22 RX ORDER — DIAZEPAM 10 MG/2ML
5 INJECTION, SOLUTION INTRAMUSCULAR; INTRAVENOUS ONCE
Status: DISCONTINUED | OUTPATIENT
Start: 2025-08-22 | End: 2025-08-23

## 2025-08-22 RX ORDER — BENZONATATE 100 MG/1
100 CAPSULE ORAL 2 TIMES DAILY PRN
COMMUNITY

## 2025-08-22 RX ORDER — SODIUM CHLORIDE 0.9 % (FLUSH) 0.9 %
5-40 SYRINGE (ML) INJECTION PRN
Status: DISCONTINUED | OUTPATIENT
Start: 2025-08-22 | End: 2025-08-27 | Stop reason: HOSPADM

## 2025-08-22 RX ORDER — HYDRALAZINE HYDROCHLORIDE 50 MG/1
100 TABLET, FILM COATED ORAL 3 TIMES DAILY
Status: DISCONTINUED | OUTPATIENT
Start: 2025-08-22 | End: 2025-08-22

## 2025-08-22 RX ORDER — PANTOPRAZOLE SODIUM 40 MG/1
40 TABLET, DELAYED RELEASE ORAL
Status: DISCONTINUED | OUTPATIENT
Start: 2025-08-22 | End: 2025-08-23

## 2025-08-22 RX ORDER — POTASSIUM CHLORIDE 7.45 MG/ML
10 INJECTION INTRAVENOUS PRN
Status: DISCONTINUED | OUTPATIENT
Start: 2025-08-22 | End: 2025-08-27 | Stop reason: HOSPADM

## 2025-08-22 RX ADMIN — IOPAMIDOL 100 ML: 612 INJECTION, SOLUTION INTRAVENOUS at 17:18

## 2025-08-22 RX ADMIN — VANCOMYCIN HYDROCHLORIDE 1000 MG: 1 INJECTION, POWDER, LYOPHILIZED, FOR SOLUTION INTRAVENOUS at 15:30

## 2025-08-22 RX ADMIN — HYDRALAZINE HYDROCHLORIDE 10 MG: 20 INJECTION INTRAMUSCULAR; INTRAVENOUS at 15:33

## 2025-08-22 RX ADMIN — POTASSIUM CHLORIDE 10 MEQ: 7.46 INJECTION, SOLUTION INTRAVENOUS at 06:08

## 2025-08-22 RX ADMIN — HYDRALAZINE HYDROCHLORIDE 10 MG: 20 INJECTION INTRAMUSCULAR; INTRAVENOUS at 20:04

## 2025-08-22 RX ADMIN — LEVETIRACETAM 2000 MG: 100 INJECTION INTRAVENOUS at 12:51

## 2025-08-22 RX ADMIN — LEVETIRACETAM 750 MG: 100 INJECTION INTRAVENOUS at 21:59

## 2025-08-22 RX ADMIN — LORAZEPAM 1 MG: 2 INJECTION INTRAMUSCULAR; INTRAVENOUS at 12:25

## 2025-08-22 RX ADMIN — HEPARIN SODIUM AND DEXTROSE 12 UNITS/KG/HR: 10000; 5 INJECTION INTRAVENOUS at 13:25

## 2025-08-22 RX ADMIN — HEPARIN SODIUM 4000 UNITS: 1000 INJECTION INTRAVENOUS; SUBCUTANEOUS at 13:22

## 2025-08-22 RX ADMIN — CEFTRIAXONE SODIUM 2000 MG: 2 INJECTION, POWDER, FOR SOLUTION INTRAMUSCULAR; INTRAVENOUS at 15:20

## 2025-08-22 RX ADMIN — SODIUM CHLORIDE, PRESERVATIVE FREE 10 ML: 5 INJECTION INTRAVENOUS at 10:35

## 2025-08-22 RX ADMIN — METOPROLOL TARTRATE 5 MG: 1 INJECTION, SOLUTION INTRAVENOUS at 20:25

## 2025-08-22 RX ADMIN — POTASSIUM CHLORIDE 10 MEQ: 7.46 INJECTION, SOLUTION INTRAVENOUS at 07:20

## 2025-08-22 RX ADMIN — SODIUM CHLORIDE, PRESERVATIVE FREE 10 ML: 5 INJECTION INTRAVENOUS at 20:05

## 2025-08-22 RX ADMIN — SODIUM CHLORIDE, SODIUM LACTATE, POTASSIUM CHLORIDE, AND CALCIUM CHLORIDE: .6; .31; .03; .02 INJECTION, SOLUTION INTRAVENOUS at 05:23

## 2025-08-22 RX ADMIN — SODIUM BICARBONATE: 84 INJECTION, SOLUTION INTRAVENOUS at 18:50

## 2025-08-22 RX ADMIN — LEVETIRACETAM 500 MG: 100 INJECTION INTRAVENOUS at 10:35

## 2025-08-23 LAB
ANION GAP SERPL CALC-SCNC: 14 MMOL/L (ref 3–18)
APTT PPP: 103.2 SEC (ref 21.7–34.2)
APTT PPP: 61.3 SEC (ref 21.7–34.2)
APTT PPP: 71.6 SEC (ref 21.7–34.2)
BACTERIA SPEC CULT: NORMAL
BASOPHILS # BLD: 0.01 K/UL (ref 0–0.1)
BASOPHILS NFR BLD: 0.1 % (ref 0–2)
BUN SERPL-MCNC: 13 MG/DL (ref 6–23)
BUN/CREAT SERPL: 15 (ref 12–20)
CALCIUM SERPL-MCNC: 9 MG/DL (ref 8.5–10.1)
CHLORIDE SERPL-SCNC: 106 MMOL/L (ref 98–107)
CO2 SERPL-SCNC: 20 MMOL/L (ref 21–32)
CREAT SERPL-MCNC: 0.9 MG/DL (ref 0.6–1.3)
DIFFERENTIAL METHOD BLD: ABNORMAL
EOSINOPHIL # BLD: 0 K/UL (ref 0–0.4)
EOSINOPHIL NFR BLD: 0 % (ref 0–5)
ERYTHROCYTE [DISTWIDTH] IN BLOOD BY AUTOMATED COUNT: 15.5 % (ref 11.6–14.5)
GLUCOSE SERPL-MCNC: 114 MG/DL (ref 74–108)
HCT VFR BLD AUTO: 40.5 % (ref 35–45)
HGB BLD-MCNC: 13.6 G/DL (ref 12–16)
IMM GRANULOCYTES # BLD AUTO: 0.18 K/UL (ref 0–0.04)
IMM GRANULOCYTES NFR BLD AUTO: 1.4 % (ref 0–0.5)
LACTATE SERPL-SCNC: 1.1 MMOL/L (ref 0.4–2)
LACTATE SERPL-SCNC: 1.9 MMOL/L (ref 0.4–2)
LACTATE SERPL-SCNC: 3.5 MMOL/L (ref 0.4–2)
LACTATE SERPL-SCNC: 4.7 MMOL/L (ref 0.4–2)
LYMPHOCYTES # BLD: 1.68 K/UL (ref 0.9–3.6)
LYMPHOCYTES NFR BLD: 12.9 % (ref 21–52)
MAGNESIUM SERPL-MCNC: 1.7 MG/DL (ref 1.6–2.6)
MCH RBC QN AUTO: 30.8 PG (ref 24–34)
MCHC RBC AUTO-ENTMCNC: 33.6 G/DL (ref 31–37)
MCV RBC AUTO: 91.6 FL (ref 78–100)
MONOCYTES # BLD: 0.84 K/UL (ref 0.05–1.2)
MONOCYTES NFR BLD: 6.5 % (ref 3–10)
NEUTS SEG # BLD: 10.31 K/UL (ref 1.8–8)
NEUTS SEG NFR BLD: 79.1 % (ref 40–73)
NRBC # BLD: 0 K/UL (ref 0–0.01)
NRBC BLD-RTO: 0 PER 100 WBC
PLATELET # BLD AUTO: 206 K/UL (ref 135–420)
PMV BLD AUTO: 12.2 FL (ref 9.2–11.8)
POTASSIUM SERPL-SCNC: 3.2 MMOL/L (ref 3.5–5.5)
POTASSIUM SERPL-SCNC: 3.4 MMOL/L (ref 3.5–5.5)
POTASSIUM SERPL-SCNC: 3.6 MMOL/L (ref 3.5–5.5)
RBC # BLD AUTO: 4.42 M/UL (ref 4.2–5.3)
SERVICE CMNT-IMP: NORMAL
SODIUM SERPL-SCNC: 141 MMOL/L (ref 136–145)
T4 FREE SERPL-MCNC: 1.2 NG/DL (ref 0.9–1.7)
VANCOMYCIN SERPL-MCNC: 11.4 UG/ML (ref 5–40)
WBC # BLD AUTO: 13 K/UL (ref 4.6–13.2)

## 2025-08-23 PROCEDURE — 6360000002 HC RX W HCPCS: Performed by: STUDENT IN AN ORGANIZED HEALTH CARE EDUCATION/TRAINING PROGRAM

## 2025-08-23 PROCEDURE — 2580000003 HC RX 258: Performed by: INTERNAL MEDICINE

## 2025-08-23 PROCEDURE — 6360000002 HC RX W HCPCS: Performed by: INTERNAL MEDICINE

## 2025-08-23 PROCEDURE — 6370000000 HC RX 637 (ALT 250 FOR IP): Performed by: STUDENT IN AN ORGANIZED HEALTH CARE EDUCATION/TRAINING PROGRAM

## 2025-08-23 PROCEDURE — 2500000003 HC RX 250 WO HCPCS: Performed by: INTERNAL MEDICINE

## 2025-08-23 PROCEDURE — 85730 THROMBOPLASTIN TIME PARTIAL: CPT

## 2025-08-23 PROCEDURE — 97535 SELF CARE MNGMENT TRAINING: CPT

## 2025-08-23 PROCEDURE — 84439 ASSAY OF FREE THYROXINE: CPT

## 2025-08-23 PROCEDURE — 97530 THERAPEUTIC ACTIVITIES: CPT

## 2025-08-23 PROCEDURE — 97162 PT EVAL MOD COMPLEX 30 MIN: CPT

## 2025-08-23 PROCEDURE — 92526 ORAL FUNCTION THERAPY: CPT

## 2025-08-23 PROCEDURE — 6370000000 HC RX 637 (ALT 250 FOR IP): Performed by: INTERNAL MEDICINE

## 2025-08-23 PROCEDURE — 99233 SBSQ HOSP IP/OBS HIGH 50: CPT | Performed by: STUDENT IN AN ORGANIZED HEALTH CARE EDUCATION/TRAINING PROGRAM

## 2025-08-23 PROCEDURE — 6360000002 HC RX W HCPCS: Performed by: HEALTH CARE PROVIDER

## 2025-08-23 PROCEDURE — 6360000002 HC RX W HCPCS: Performed by: PSYCHIATRY & NEUROLOGY

## 2025-08-23 PROCEDURE — 94761 N-INVAS EAR/PLS OXIMETRY MLT: CPT

## 2025-08-23 PROCEDURE — 2580000003 HC RX 258: Performed by: STUDENT IN AN ORGANIZED HEALTH CARE EDUCATION/TRAINING PROGRAM

## 2025-08-23 PROCEDURE — 84132 ASSAY OF SERUM POTASSIUM: CPT

## 2025-08-23 PROCEDURE — 99232 SBSQ HOSP IP/OBS MODERATE 35: CPT | Performed by: INTERNAL MEDICINE

## 2025-08-23 PROCEDURE — 2000000000 HC ICU R&B

## 2025-08-23 PROCEDURE — 97166 OT EVAL MOD COMPLEX 45 MIN: CPT

## 2025-08-23 PROCEDURE — 36415 COLL VENOUS BLD VENIPUNCTURE: CPT

## 2025-08-23 PROCEDURE — 85025 COMPLETE CBC W/AUTO DIFF WBC: CPT

## 2025-08-23 PROCEDURE — 80048 BASIC METABOLIC PNL TOTAL CA: CPT

## 2025-08-23 PROCEDURE — 80202 ASSAY OF VANCOMYCIN: CPT

## 2025-08-23 PROCEDURE — 83605 ASSAY OF LACTIC ACID: CPT

## 2025-08-23 PROCEDURE — 92610 EVALUATE SWALLOWING FUNCTION: CPT

## 2025-08-23 PROCEDURE — 83735 ASSAY OF MAGNESIUM: CPT

## 2025-08-23 RX ORDER — HYDRALAZINE HYDROCHLORIDE 20 MG/ML
10 INJECTION INTRAMUSCULAR; INTRAVENOUS EVERY 6 HOURS PRN
Status: DISCONTINUED | OUTPATIENT
Start: 2025-08-23 | End: 2025-08-27 | Stop reason: HOSPADM

## 2025-08-23 RX ORDER — FUROSEMIDE 10 MG/ML
20 INJECTION INTRAMUSCULAR; INTRAVENOUS ONCE
Status: COMPLETED | OUTPATIENT
Start: 2025-08-23 | End: 2025-08-23

## 2025-08-23 RX ORDER — ATORVASTATIN CALCIUM 40 MG/1
40 TABLET, FILM COATED ORAL NIGHTLY
Status: DISCONTINUED | OUTPATIENT
Start: 2025-08-23 | End: 2025-08-27 | Stop reason: HOSPADM

## 2025-08-23 RX ORDER — ATORVASTATIN CALCIUM 40 MG/1
40 TABLET, FILM COATED ORAL NIGHTLY
Status: DISCONTINUED | OUTPATIENT
Start: 2025-08-23 | End: 2025-08-23

## 2025-08-23 RX ORDER — PANTOPRAZOLE SODIUM 40 MG/1
40 TABLET, DELAYED RELEASE ORAL
Status: DISCONTINUED | OUTPATIENT
Start: 2025-08-23 | End: 2025-08-26

## 2025-08-23 RX ORDER — MAGNESIUM SULFATE HEPTAHYDRATE 40 MG/ML
2000 INJECTION, SOLUTION INTRAVENOUS ONCE
Status: COMPLETED | OUTPATIENT
Start: 2025-08-23 | End: 2025-08-23

## 2025-08-23 RX ORDER — ATORVASTATIN CALCIUM 40 MG/1
40 TABLET, FILM COATED ORAL NIGHTLY
Status: DISCONTINUED | OUTPATIENT
Start: 2025-08-24 | End: 2025-08-23

## 2025-08-23 RX ADMIN — HEPARIN SODIUM 2000 UNITS: 1000 INJECTION INTRAVENOUS; SUBCUTANEOUS at 14:00

## 2025-08-23 RX ADMIN — HYDRALAZINE HYDROCHLORIDE 10 MG: 20 INJECTION INTRAMUSCULAR; INTRAVENOUS at 11:10

## 2025-08-23 RX ADMIN — SACUBITRIL AND VALSARTAN 1 TABLET: 49; 51 TABLET, FILM COATED ORAL at 21:27

## 2025-08-23 RX ADMIN — SODIUM BICARBONATE: 84 INJECTION, SOLUTION INTRAVENOUS at 05:26

## 2025-08-23 RX ADMIN — METOPROLOL TARTRATE 5 MG: 1 INJECTION, SOLUTION INTRAVENOUS at 01:35

## 2025-08-23 RX ADMIN — HYDRALAZINE HYDROCHLORIDE 10 MG: 20 INJECTION INTRAMUSCULAR; INTRAVENOUS at 00:05

## 2025-08-23 RX ADMIN — ONDANSETRON 4 MG: 4 TABLET, ORALLY DISINTEGRATING ORAL at 10:22

## 2025-08-23 RX ADMIN — LEVETIRACETAM 750 MG: 100 INJECTION INTRAVENOUS at 10:17

## 2025-08-23 RX ADMIN — HEPARIN SODIUM 2000 UNITS: 1000 INJECTION INTRAVENOUS; SUBCUTANEOUS at 05:11

## 2025-08-23 RX ADMIN — HEPARIN SODIUM AND DEXTROSE 11 UNITS/KG/HR: 10000; 5 INJECTION INTRAVENOUS at 13:04

## 2025-08-23 RX ADMIN — FUROSEMIDE 20 MG: 10 INJECTION, SOLUTION INTRAMUSCULAR; INTRAVENOUS at 15:28

## 2025-08-23 RX ADMIN — ATORVASTATIN CALCIUM 40 MG: 40 TABLET, FILM COATED ORAL at 21:27

## 2025-08-23 RX ADMIN — POTASSIUM BICARBONATE 40 MEQ: 782 TABLET, EFFERVESCENT ORAL at 12:34

## 2025-08-23 RX ADMIN — MAGNESIUM SULFATE HEPTAHYDRATE 2000 MG: 40 INJECTION, SOLUTION INTRAVENOUS at 13:03

## 2025-08-23 RX ADMIN — LEVETIRACETAM 750 MG: 100 INJECTION INTRAVENOUS at 21:30

## 2025-08-23 RX ADMIN — VANCOMYCIN HYDROCHLORIDE 1000 MG: 1 INJECTION, POWDER, LYOPHILIZED, FOR SOLUTION INTRAVENOUS at 10:42

## 2025-08-23 RX ADMIN — CARVEDILOL 12.5 MG: 12.5 TABLET, FILM COATED ORAL at 15:28

## 2025-08-23 RX ADMIN — POLYETHYLENE GLYCOL 3350 17 G: 17 POWDER, FOR SOLUTION ORAL at 12:34

## 2025-08-23 RX ADMIN — SODIUM CHLORIDE: 0.9 INJECTION, SOLUTION INTRAVENOUS at 13:01

## 2025-08-23 RX ADMIN — POTASSIUM CHLORIDE 10 MEQ: 7.46 INJECTION, SOLUTION INTRAVENOUS at 05:20

## 2025-08-23 RX ADMIN — PANTOPRAZOLE SODIUM 40 MG: 40 TABLET, DELAYED RELEASE ORAL at 10:14

## 2025-08-23 RX ADMIN — CEFTRIAXONE SODIUM 2000 MG: 2 INJECTION, POWDER, FOR SOLUTION INTRAMUSCULAR; INTRAVENOUS at 14:01

## 2025-08-23 RX ADMIN — POTASSIUM CHLORIDE 10 MEQ: 7.46 INJECTION, SOLUTION INTRAVENOUS at 06:21

## 2025-08-23 ASSESSMENT — PAIN SCALES - GENERAL: PAINLEVEL_OUTOF10: 0

## 2025-08-24 LAB
ALBUMIN SERPL-MCNC: 2.7 G/DL (ref 3.4–5)
ALBUMIN/GLOB SERPL: 0.8 (ref 0.8–1.7)
ALP SERPL-CCNC: 81 U/L (ref 45–117)
ALT SERPL-CCNC: 29 U/L (ref 10–35)
ANION GAP SERPL CALC-SCNC: 11 MMOL/L (ref 3–18)
APTT PPP: 132.7 SEC (ref 21.7–34.2)
APTT PPP: 73.3 SEC (ref 21.7–34.2)
APTT PPP: 85.7 SEC (ref 21.7–34.2)
AST SERPL-CCNC: 94 U/L (ref 10–38)
BACTERIA SPEC CULT: ABNORMAL
BASOPHILS # BLD: 0.02 K/UL (ref 0–0.1)
BASOPHILS NFR BLD: 0.2 % (ref 0–2)
BILIRUB SERPL-MCNC: 0.8 MG/DL (ref 0.2–1)
BUN SERPL-MCNC: 13 MG/DL (ref 6–23)
BUN/CREAT SERPL: 14 (ref 12–20)
CALCIUM SERPL-MCNC: 8.6 MG/DL (ref 8.5–10.1)
CHLORIDE SERPL-SCNC: 102 MMOL/L (ref 98–107)
CO2 SERPL-SCNC: 24 MMOL/L (ref 21–32)
CREAT SERPL-MCNC: 0.96 MG/DL (ref 0.6–1.3)
DIFFERENTIAL METHOD BLD: ABNORMAL
EOSINOPHIL # BLD: 0.05 K/UL (ref 0–0.4)
EOSINOPHIL NFR BLD: 0.5 % (ref 0–5)
ERYTHROCYTE [DISTWIDTH] IN BLOOD BY AUTOMATED COUNT: 15.8 % (ref 11.6–14.5)
GLOBULIN SER CALC-MCNC: 3.5 G/DL (ref 2–4)
GLUCOSE SERPL-MCNC: 117 MG/DL (ref 74–108)
GRAM STN SPEC: ABNORMAL
GRAM STN SPEC: ABNORMAL
HCT VFR BLD AUTO: 38.8 % (ref 35–45)
HGB BLD-MCNC: 12.6 G/DL (ref 12–16)
IMM GRANULOCYTES # BLD AUTO: 0.03 K/UL (ref 0–0.04)
IMM GRANULOCYTES NFR BLD AUTO: 0.3 % (ref 0–0.5)
LACTATE SERPL-SCNC: 1 MMOL/L (ref 0.4–2)
LYMPHOCYTES # BLD: 2.34 K/UL (ref 0.9–3.6)
LYMPHOCYTES NFR BLD: 21.6 % (ref 21–52)
MAGNESIUM SERPL-MCNC: 1.8 MG/DL (ref 1.6–2.6)
MCH RBC QN AUTO: 29.9 PG (ref 24–34)
MCHC RBC AUTO-ENTMCNC: 32.5 G/DL (ref 31–37)
MCV RBC AUTO: 91.9 FL (ref 78–100)
MONOCYTES # BLD: 0.8 K/UL (ref 0.05–1.2)
MONOCYTES NFR BLD: 7.4 % (ref 3–10)
NEUTS SEG # BLD: 7.59 K/UL (ref 1.8–8)
NEUTS SEG NFR BLD: 70 % (ref 40–73)
NRBC # BLD: 0 K/UL (ref 0–0.01)
NRBC BLD-RTO: 0 PER 100 WBC
PLATELET # BLD AUTO: 197 K/UL (ref 135–420)
PMV BLD AUTO: 12.2 FL (ref 9.2–11.8)
POTASSIUM SERPL-SCNC: 3.5 MMOL/L (ref 3.5–5.5)
PROT SERPL-MCNC: 6.2 G/DL (ref 6.4–8.2)
RBC # BLD AUTO: 4.22 M/UL (ref 4.2–5.3)
SERVICE CMNT-IMP: ABNORMAL
SODIUM SERPL-SCNC: 136 MMOL/L (ref 136–145)
VANCOMYCIN SERPL-MCNC: 8.3 UG/ML (ref 5–40)
WBC # BLD AUTO: 10.8 K/UL (ref 4.6–13.2)

## 2025-08-24 PROCEDURE — 6360000002 HC RX W HCPCS: Performed by: HEALTH CARE PROVIDER

## 2025-08-24 PROCEDURE — 80202 ASSAY OF VANCOMYCIN: CPT

## 2025-08-24 PROCEDURE — 6360000002 HC RX W HCPCS: Performed by: INTERNAL MEDICINE

## 2025-08-24 PROCEDURE — 6370000000 HC RX 637 (ALT 250 FOR IP): Performed by: STUDENT IN AN ORGANIZED HEALTH CARE EDUCATION/TRAINING PROGRAM

## 2025-08-24 PROCEDURE — 2000000000 HC ICU R&B

## 2025-08-24 PROCEDURE — 6360000002 HC RX W HCPCS: Performed by: STUDENT IN AN ORGANIZED HEALTH CARE EDUCATION/TRAINING PROGRAM

## 2025-08-24 PROCEDURE — 94761 N-INVAS EAR/PLS OXIMETRY MLT: CPT

## 2025-08-24 PROCEDURE — 83605 ASSAY OF LACTIC ACID: CPT

## 2025-08-24 PROCEDURE — 80053 COMPREHEN METABOLIC PANEL: CPT

## 2025-08-24 PROCEDURE — 83735 ASSAY OF MAGNESIUM: CPT

## 2025-08-24 PROCEDURE — 2580000003 HC RX 258: Performed by: INTERNAL MEDICINE

## 2025-08-24 PROCEDURE — 99232 SBSQ HOSP IP/OBS MODERATE 35: CPT | Performed by: INTERNAL MEDICINE

## 2025-08-24 PROCEDURE — 85730 THROMBOPLASTIN TIME PARTIAL: CPT

## 2025-08-24 PROCEDURE — 2500000003 HC RX 250 WO HCPCS: Performed by: INTERNAL MEDICINE

## 2025-08-24 PROCEDURE — 51702 INSERT TEMP BLADDER CATH: CPT

## 2025-08-24 PROCEDURE — 85025 COMPLETE CBC W/AUTO DIFF WBC: CPT

## 2025-08-24 PROCEDURE — 6360000002 HC RX W HCPCS: Performed by: PSYCHIATRY & NEUROLOGY

## 2025-08-24 PROCEDURE — 2500000003 HC RX 250 WO HCPCS: Performed by: STUDENT IN AN ORGANIZED HEALTH CARE EDUCATION/TRAINING PROGRAM

## 2025-08-24 PROCEDURE — 2580000003 HC RX 258: Performed by: STUDENT IN AN ORGANIZED HEALTH CARE EDUCATION/TRAINING PROGRAM

## 2025-08-24 PROCEDURE — 99232 SBSQ HOSP IP/OBS MODERATE 35: CPT | Performed by: STUDENT IN AN ORGANIZED HEALTH CARE EDUCATION/TRAINING PROGRAM

## 2025-08-24 PROCEDURE — 6370000000 HC RX 637 (ALT 250 FOR IP): Performed by: INTERNAL MEDICINE

## 2025-08-24 RX ORDER — FUROSEMIDE 10 MG/ML
40 INJECTION INTRAMUSCULAR; INTRAVENOUS ONCE
Status: COMPLETED | OUTPATIENT
Start: 2025-08-24 | End: 2025-08-24

## 2025-08-24 RX ADMIN — HYDRALAZINE HYDROCHLORIDE 10 MG: 20 INJECTION INTRAMUSCULAR; INTRAVENOUS at 03:07

## 2025-08-24 RX ADMIN — CEFTRIAXONE SODIUM 2000 MG: 2 INJECTION, POWDER, FOR SOLUTION INTRAMUSCULAR; INTRAVENOUS at 13:16

## 2025-08-24 RX ADMIN — PANTOPRAZOLE SODIUM 40 MG: 40 TABLET, DELAYED RELEASE ORAL at 07:39

## 2025-08-24 RX ADMIN — SODIUM CHLORIDE: 0.9 INJECTION, SOLUTION INTRAVENOUS at 10:28

## 2025-08-24 RX ADMIN — HEPARIN SODIUM AND DEXTROSE 11 UNITS/KG/HR: 10000; 5 INJECTION INTRAVENOUS at 14:22

## 2025-08-24 RX ADMIN — FUROSEMIDE 40 MG: 20 TABLET ORAL at 07:39

## 2025-08-24 RX ADMIN — FUROSEMIDE 40 MG: 10 INJECTION, SOLUTION INTRAMUSCULAR; INTRAVENOUS at 10:06

## 2025-08-24 RX ADMIN — POTASSIUM BICARBONATE 40 MEQ: 782 TABLET, EFFERVESCENT ORAL at 09:01

## 2025-08-24 RX ADMIN — ACETAMINOPHEN 650 MG: 325 TABLET ORAL at 20:26

## 2025-08-24 RX ADMIN — Medication 400 MG: at 07:39

## 2025-08-24 RX ADMIN — AMLODIPINE BESYLATE 10 MG: 5 TABLET ORAL at 11:00

## 2025-08-24 RX ADMIN — VANCOMYCIN HYDROCHLORIDE 1000 MG: 1 INJECTION, POWDER, LYOPHILIZED, FOR SOLUTION INTRAVENOUS at 04:05

## 2025-08-24 RX ADMIN — ATORVASTATIN CALCIUM 40 MG: 40 TABLET, FILM COATED ORAL at 20:26

## 2025-08-24 RX ADMIN — LEVETIRACETAM 750 MG: 100 INJECTION INTRAVENOUS at 10:06

## 2025-08-24 RX ADMIN — SODIUM CHLORIDE, PRESERVATIVE FREE 10 ML: 5 INJECTION INTRAVENOUS at 07:40

## 2025-08-24 RX ADMIN — CARVEDILOL 12.5 MG: 12.5 TABLET, FILM COATED ORAL at 07:39

## 2025-08-24 RX ADMIN — SACUBITRIL AND VALSARTAN 1 TABLET: 49; 51 TABLET, FILM COATED ORAL at 09:16

## 2025-08-24 RX ADMIN — VANCOMYCIN HYDROCHLORIDE 1000 MG: 1 INJECTION, POWDER, LYOPHILIZED, FOR SOLUTION INTRAVENOUS at 22:15

## 2025-08-24 RX ADMIN — SODIUM BICARBONATE: 84 INJECTION, SOLUTION INTRAVENOUS at 02:52

## 2025-08-24 RX ADMIN — POLYETHYLENE GLYCOL 3350 17 G: 17 POWDER, FOR SOLUTION ORAL at 09:01

## 2025-08-24 RX ADMIN — SODIUM CHLORIDE, PRESERVATIVE FREE 10 ML: 5 INJECTION INTRAVENOUS at 12:28

## 2025-08-24 RX ADMIN — SACUBITRIL AND VALSARTAN 1 TABLET: 49; 51 TABLET, FILM COATED ORAL at 20:26

## 2025-08-24 RX ADMIN — LEVETIRACETAM 750 MG: 100 INJECTION INTRAVENOUS at 22:15

## 2025-08-24 ASSESSMENT — PAIN DESCRIPTION - LOCATION: LOCATION: GENERALIZED

## 2025-08-24 ASSESSMENT — PAIN - FUNCTIONAL ASSESSMENT
PAIN_FUNCTIONAL_ASSESSMENT: 0-10
PAIN_FUNCTIONAL_ASSESSMENT: 0-10

## 2025-08-24 ASSESSMENT — PAIN SCALES - GENERAL
PAINLEVEL_OUTOF10: 2
PAINLEVEL_OUTOF10: 0

## 2025-08-24 ASSESSMENT — PAIN DESCRIPTION - DESCRIPTORS: DESCRIPTORS: ACHING

## 2025-08-25 ENCOUNTER — APPOINTMENT (OUTPATIENT)
Facility: HOSPITAL | Age: 80
DRG: 871 | End: 2025-08-25
Payer: MEDICARE

## 2025-08-25 PROBLEM — B96.1 BACTEREMIA DUE TO KLEBSIELLA PNEUMONIAE: Status: ACTIVE | Noted: 2025-08-22

## 2025-08-25 LAB
ANION GAP SERPL CALC-SCNC: 10 MMOL/L (ref 3–18)
APTT PPP: 94.3 SEC (ref 21.7–34.2)
BASOPHILS # BLD: 0.02 K/UL (ref 0–0.1)
BASOPHILS NFR BLD: 0.3 % (ref 0–2)
BUN SERPL-MCNC: 14 MG/DL (ref 6–23)
BUN/CREAT SERPL: 15 (ref 12–20)
CALCIUM SERPL-MCNC: 8.8 MG/DL (ref 8.5–10.1)
CHLORIDE SERPL-SCNC: 103 MMOL/L (ref 98–107)
CO2 SERPL-SCNC: 26 MMOL/L (ref 21–32)
CREAT SERPL-MCNC: 0.94 MG/DL (ref 0.6–1.3)
DIFFERENTIAL METHOD BLD: ABNORMAL
EOSINOPHIL # BLD: 0.09 K/UL (ref 0–0.4)
EOSINOPHIL NFR BLD: 1.1 % (ref 0–5)
ERYTHROCYTE [DISTWIDTH] IN BLOOD BY AUTOMATED COUNT: 15.4 % (ref 11.6–14.5)
GLUCOSE SERPL-MCNC: 106 MG/DL (ref 74–108)
HCT VFR BLD AUTO: 38.8 % (ref 35–45)
HGB BLD-MCNC: 12.7 G/DL (ref 12–16)
IMM GRANULOCYTES # BLD AUTO: 0.01 K/UL (ref 0–0.04)
IMM GRANULOCYTES NFR BLD AUTO: 0.1 % (ref 0–0.5)
LYMPHOCYTES # BLD: 2.39 K/UL (ref 0.9–3.6)
LYMPHOCYTES NFR BLD: 30.5 % (ref 21–52)
MCH RBC QN AUTO: 30.7 PG (ref 24–34)
MCHC RBC AUTO-ENTMCNC: 32.7 G/DL (ref 31–37)
MCV RBC AUTO: 93.7 FL (ref 78–100)
MONOCYTES # BLD: 0.74 K/UL (ref 0.05–1.2)
MONOCYTES NFR BLD: 9.4 % (ref 3–10)
NEUTS SEG # BLD: 4.59 K/UL (ref 1.8–8)
NEUTS SEG NFR BLD: 58.6 % (ref 40–73)
NRBC # BLD: 0 K/UL (ref 0–0.01)
NRBC BLD-RTO: 0 PER 100 WBC
PLATELET # BLD AUTO: 178 K/UL (ref 135–420)
PMV BLD AUTO: 11.9 FL (ref 9.2–11.8)
POTASSIUM SERPL-SCNC: 3.2 MMOL/L (ref 3.5–5.5)
RBC # BLD AUTO: 4.14 M/UL (ref 4.2–5.3)
SODIUM SERPL-SCNC: 139 MMOL/L (ref 136–145)
WBC # BLD AUTO: 7.8 K/UL (ref 4.6–13.2)

## 2025-08-25 PROCEDURE — 99232 SBSQ HOSP IP/OBS MODERATE 35: CPT | Performed by: STUDENT IN AN ORGANIZED HEALTH CARE EDUCATION/TRAINING PROGRAM

## 2025-08-25 PROCEDURE — 2500000003 HC RX 250 WO HCPCS: Performed by: STUDENT IN AN ORGANIZED HEALTH CARE EDUCATION/TRAINING PROGRAM

## 2025-08-25 PROCEDURE — 6360000002 HC RX W HCPCS: Performed by: STUDENT IN AN ORGANIZED HEALTH CARE EDUCATION/TRAINING PROGRAM

## 2025-08-25 PROCEDURE — 85025 COMPLETE CBC W/AUTO DIFF WBC: CPT

## 2025-08-25 PROCEDURE — 6360000002 HC RX W HCPCS: Performed by: HEALTH CARE PROVIDER

## 2025-08-25 PROCEDURE — 87040 BLOOD CULTURE FOR BACTERIA: CPT

## 2025-08-25 PROCEDURE — 6370000000 HC RX 637 (ALT 250 FOR IP): Performed by: STUDENT IN AN ORGANIZED HEALTH CARE EDUCATION/TRAINING PROGRAM

## 2025-08-25 PROCEDURE — 99232 SBSQ HOSP IP/OBS MODERATE 35: CPT | Performed by: INTERNAL MEDICINE

## 2025-08-25 PROCEDURE — 2000000000 HC ICU R&B

## 2025-08-25 PROCEDURE — 6360000002 HC RX W HCPCS: Performed by: INTERNAL MEDICINE

## 2025-08-25 PROCEDURE — 70551 MRI BRAIN STEM W/O DYE: CPT

## 2025-08-25 PROCEDURE — 94761 N-INVAS EAR/PLS OXIMETRY MLT: CPT

## 2025-08-25 PROCEDURE — 97530 THERAPEUTIC ACTIVITIES: CPT

## 2025-08-25 PROCEDURE — 6360000002 HC RX W HCPCS: Performed by: PSYCHIATRY & NEUROLOGY

## 2025-08-25 PROCEDURE — 6370000000 HC RX 637 (ALT 250 FOR IP): Performed by: INTERNAL MEDICINE

## 2025-08-25 PROCEDURE — 36415 COLL VENOUS BLD VENIPUNCTURE: CPT

## 2025-08-25 PROCEDURE — 80048 BASIC METABOLIC PNL TOTAL CA: CPT

## 2025-08-25 PROCEDURE — 85730 THROMBOPLASTIN TIME PARTIAL: CPT

## 2025-08-25 PROCEDURE — 92526 ORAL FUNCTION THERAPY: CPT

## 2025-08-25 PROCEDURE — 2500000003 HC RX 250 WO HCPCS: Performed by: INTERNAL MEDICINE

## 2025-08-25 RX ADMIN — FUROSEMIDE 40 MG: 20 TABLET ORAL at 08:31

## 2025-08-25 RX ADMIN — SODIUM CHLORIDE, PRESERVATIVE FREE 10 ML: 5 INJECTION INTRAVENOUS at 08:34

## 2025-08-25 RX ADMIN — SACUBITRIL AND VALSARTAN 1 TABLET: 49; 51 TABLET, FILM COATED ORAL at 21:03

## 2025-08-25 RX ADMIN — SODIUM CHLORIDE, PRESERVATIVE FREE 10 ML: 5 INJECTION INTRAVENOUS at 21:05

## 2025-08-25 RX ADMIN — CARVEDILOL 12.5 MG: 12.5 TABLET, FILM COATED ORAL at 08:31

## 2025-08-25 RX ADMIN — LEVETIRACETAM 750 MG: 100 INJECTION INTRAVENOUS at 21:03

## 2025-08-25 RX ADMIN — POTASSIUM CHLORIDE 40 MEQ: 1500 TABLET, EXTENDED RELEASE ORAL at 08:31

## 2025-08-25 RX ADMIN — CARVEDILOL 12.5 MG: 12.5 TABLET, FILM COATED ORAL at 17:55

## 2025-08-25 RX ADMIN — Medication 400 MG: at 08:31

## 2025-08-25 RX ADMIN — HEPARIN SODIUM AND DEXTROSE 11 UNITS/KG/HR: 10000; 5 INJECTION INTRAVENOUS at 16:09

## 2025-08-25 RX ADMIN — HYDRALAZINE HYDROCHLORIDE 10 MG: 20 INJECTION INTRAMUSCULAR; INTRAVENOUS at 04:41

## 2025-08-25 RX ADMIN — SACUBITRIL AND VALSARTAN 1 TABLET: 49; 51 TABLET, FILM COATED ORAL at 08:31

## 2025-08-25 RX ADMIN — APIXABAN 5 MG: 5 TABLET, FILM COATED ORAL at 21:03

## 2025-08-25 RX ADMIN — ATORVASTATIN CALCIUM 40 MG: 40 TABLET, FILM COATED ORAL at 21:03

## 2025-08-25 RX ADMIN — LEVETIRACETAM 750 MG: 100 INJECTION INTRAVENOUS at 10:05

## 2025-08-25 RX ADMIN — CEFTRIAXONE SODIUM 2000 MG: 2 INJECTION, POWDER, FOR SOLUTION INTRAMUSCULAR; INTRAVENOUS at 13:23

## 2025-08-25 RX ADMIN — POLYETHYLENE GLYCOL 3350 17 G: 17 POWDER, FOR SOLUTION ORAL at 08:31

## 2025-08-25 RX ADMIN — PANTOPRAZOLE SODIUM 40 MG: 40 TABLET, DELAYED RELEASE ORAL at 08:31

## 2025-08-25 ASSESSMENT — PAIN SCALES - GENERAL
PAINLEVEL_OUTOF10: 0

## 2025-08-26 PROBLEM — K57.90 DIVERTICULOSIS: Chronic | Status: ACTIVE | Noted: 2025-08-26

## 2025-08-26 PROBLEM — I50.20 HEART FAILURE WITH REDUCED EJECTION FRACTION (HCC): Chronic | Status: ACTIVE | Noted: 2025-08-26

## 2025-08-26 PROBLEM — E87.20 LACTIC ACIDOSIS: Status: ACTIVE | Noted: 2025-08-26

## 2025-08-26 PROBLEM — N17.9 AKI (ACUTE KIDNEY INJURY): Status: ACTIVE | Noted: 2025-08-26

## 2025-08-26 LAB
ALBUMIN SERPL-MCNC: 2.6 G/DL (ref 3.4–5)
ALBUMIN/GLOB SERPL: 0.8 (ref 0.8–1.7)
ALP SERPL-CCNC: 74 U/L (ref 45–117)
ALT SERPL-CCNC: 46 U/L (ref 10–35)
ANION GAP SERPL CALC-SCNC: 10 MMOL/L (ref 3–18)
AST SERPL-CCNC: 118 U/L (ref 10–38)
BASOPHILS # BLD: 0.01 K/UL (ref 0–0.1)
BASOPHILS NFR BLD: 0.2 % (ref 0–2)
BILIRUB SERPL-MCNC: 0.4 MG/DL (ref 0.2–1)
BUN SERPL-MCNC: 15 MG/DL (ref 6–23)
BUN/CREAT SERPL: 14 (ref 12–20)
CALCIUM SERPL-MCNC: 9.1 MG/DL (ref 8.5–10.1)
CHLORIDE SERPL-SCNC: 105 MMOL/L (ref 98–107)
CO2 SERPL-SCNC: 24 MMOL/L (ref 21–32)
CREAT SERPL-MCNC: 1.08 MG/DL (ref 0.6–1.3)
DIFFERENTIAL METHOD BLD: ABNORMAL
EOSINOPHIL # BLD: 0.1 K/UL (ref 0–0.4)
EOSINOPHIL NFR BLD: 1.6 % (ref 0–5)
ERYTHROCYTE [DISTWIDTH] IN BLOOD BY AUTOMATED COUNT: 15.2 % (ref 11.6–14.5)
GLOBULIN SER CALC-MCNC: 3.3 G/DL (ref 2–4)
GLUCOSE SERPL-MCNC: 103 MG/DL (ref 74–108)
HCT VFR BLD AUTO: 37.9 % (ref 35–45)
HGB BLD-MCNC: 12.1 G/DL (ref 12–16)
IMM GRANULOCYTES # BLD AUTO: 0.03 K/UL (ref 0–0.04)
IMM GRANULOCYTES NFR BLD AUTO: 0.5 % (ref 0–0.5)
LYMPHOCYTES # BLD: 2.09 K/UL (ref 0.9–3.6)
LYMPHOCYTES NFR BLD: 32.4 % (ref 21–52)
MCH RBC QN AUTO: 29.9 PG (ref 24–34)
MCHC RBC AUTO-ENTMCNC: 31.9 G/DL (ref 31–37)
MCV RBC AUTO: 93.6 FL (ref 78–100)
MONOCYTES # BLD: 0.64 K/UL (ref 0.05–1.2)
MONOCYTES NFR BLD: 9.9 % (ref 3–10)
NEUTS SEG # BLD: 3.58 K/UL (ref 1.8–8)
NEUTS SEG NFR BLD: 55.4 % (ref 40–73)
NRBC # BLD: 0 K/UL (ref 0–0.01)
NRBC BLD-RTO: 0 PER 100 WBC
PLATELET # BLD AUTO: 177 K/UL (ref 135–420)
PMV BLD AUTO: 11.9 FL (ref 9.2–11.8)
POTASSIUM SERPL-SCNC: 3.7 MMOL/L (ref 3.5–5.5)
PROT SERPL-MCNC: 5.9 G/DL (ref 6.4–8.2)
RBC # BLD AUTO: 4.05 M/UL (ref 4.2–5.3)
SODIUM SERPL-SCNC: 139 MMOL/L (ref 136–145)
WBC # BLD AUTO: 6.5 K/UL (ref 4.6–13.2)

## 2025-08-26 PROCEDURE — 80053 COMPREHEN METABOLIC PANEL: CPT

## 2025-08-26 PROCEDURE — 6370000000 HC RX 637 (ALT 250 FOR IP): Performed by: HEALTH CARE PROVIDER

## 2025-08-26 PROCEDURE — 2500000003 HC RX 250 WO HCPCS: Performed by: STUDENT IN AN ORGANIZED HEALTH CARE EDUCATION/TRAINING PROGRAM

## 2025-08-26 PROCEDURE — 6360000002 HC RX W HCPCS: Performed by: INTERNAL MEDICINE

## 2025-08-26 PROCEDURE — 97530 THERAPEUTIC ACTIVITIES: CPT

## 2025-08-26 PROCEDURE — 2000000000 HC ICU R&B

## 2025-08-26 PROCEDURE — 97116 GAIT TRAINING THERAPY: CPT

## 2025-08-26 PROCEDURE — 94761 N-INVAS EAR/PLS OXIMETRY MLT: CPT

## 2025-08-26 PROCEDURE — 99232 SBSQ HOSP IP/OBS MODERATE 35: CPT | Performed by: STUDENT IN AN ORGANIZED HEALTH CARE EDUCATION/TRAINING PROGRAM

## 2025-08-26 PROCEDURE — 2500000003 HC RX 250 WO HCPCS: Performed by: INTERNAL MEDICINE

## 2025-08-26 PROCEDURE — 6370000000 HC RX 637 (ALT 250 FOR IP): Performed by: STUDENT IN AN ORGANIZED HEALTH CARE EDUCATION/TRAINING PROGRAM

## 2025-08-26 PROCEDURE — 6370000000 HC RX 637 (ALT 250 FOR IP): Performed by: INTERNAL MEDICINE

## 2025-08-26 PROCEDURE — 6360000002 HC RX W HCPCS: Performed by: PSYCHIATRY & NEUROLOGY

## 2025-08-26 PROCEDURE — 99239 HOSP IP/OBS DSCHRG MGMT >30: CPT | Performed by: STUDENT IN AN ORGANIZED HEALTH CARE EDUCATION/TRAINING PROGRAM

## 2025-08-26 PROCEDURE — 85025 COMPLETE CBC W/AUTO DIFF WBC: CPT

## 2025-08-26 PROCEDURE — 51798 US URINE CAPACITY MEASURE: CPT

## 2025-08-26 RX ORDER — FLUTICASONE PROPIONATE 50 MCG
2 SPRAY, SUSPENSION (ML) NASAL DAILY PRN
Status: DISCONTINUED | OUTPATIENT
Start: 2025-08-26 | End: 2025-08-27 | Stop reason: HOSPADM

## 2025-08-26 RX ORDER — PANTOPRAZOLE SODIUM 40 MG/1
40 TABLET, DELAYED RELEASE ORAL EVERY 24 HOURS
Status: DISCONTINUED | OUTPATIENT
Start: 2025-08-26 | End: 2025-08-27 | Stop reason: HOSPADM

## 2025-08-26 RX ORDER — CEFDINIR 300 MG/1
300 CAPSULE ORAL 2 TIMES DAILY
Qty: 12 CAPSULE | Refills: 0 | Status: SHIPPED | OUTPATIENT
Start: 2025-08-26 | End: 2025-09-01

## 2025-08-26 RX ORDER — CETIRIZINE HYDROCHLORIDE 10 MG/1
5 TABLET ORAL DAILY PRN
Status: DISCONTINUED | OUTPATIENT
Start: 2025-08-26 | End: 2025-08-27 | Stop reason: HOSPADM

## 2025-08-26 RX ORDER — LEVETIRACETAM 500 MG/1
750 TABLET ORAL 2 TIMES DAILY
Qty: 90 TABLET | Refills: 0 | Status: SHIPPED | OUTPATIENT
Start: 2025-08-26

## 2025-08-26 RX ORDER — LEVETIRACETAM 250 MG/1
750 TABLET ORAL 2 TIMES DAILY
Status: DISCONTINUED | OUTPATIENT
Start: 2025-08-26 | End: 2025-08-27 | Stop reason: HOSPADM

## 2025-08-26 RX ORDER — SACUBITRIL AND VALSARTAN 49; 51 MG/1; MG/1
1 TABLET ORAL 2 TIMES DAILY
Qty: 60 TABLET | Refills: 0 | Status: SHIPPED | OUTPATIENT
Start: 2025-08-26

## 2025-08-26 RX ADMIN — FLUTICASONE PROPIONATE 2 SPRAY: 50 SPRAY, METERED NASAL at 14:08

## 2025-08-26 RX ADMIN — PANTOPRAZOLE SODIUM 40 MG: 40 TABLET, DELAYED RELEASE ORAL at 08:33

## 2025-08-26 RX ADMIN — SODIUM CHLORIDE, PRESERVATIVE FREE 10 ML: 5 INJECTION INTRAVENOUS at 09:57

## 2025-08-26 RX ADMIN — CARVEDILOL 12.5 MG: 12.5 TABLET, FILM COATED ORAL at 08:33

## 2025-08-26 RX ADMIN — SACUBITRIL AND VALSARTAN 1 TABLET: 49; 51 TABLET, FILM COATED ORAL at 21:20

## 2025-08-26 RX ADMIN — Medication 400 MG: at 09:56

## 2025-08-26 RX ADMIN — LEVETIRACETAM 750 MG: 100 INJECTION INTRAVENOUS at 09:57

## 2025-08-26 RX ADMIN — CETIRIZINE HYDROCHLORIDE 5 MG: 10 TABLET ORAL at 10:35

## 2025-08-26 RX ADMIN — CARVEDILOL 12.5 MG: 12.5 TABLET, FILM COATED ORAL at 17:07

## 2025-08-26 RX ADMIN — CEFTRIAXONE SODIUM 2000 MG: 2 INJECTION, POWDER, FOR SOLUTION INTRAMUSCULAR; INTRAVENOUS at 13:58

## 2025-08-26 RX ADMIN — FUROSEMIDE 40 MG: 20 TABLET ORAL at 09:56

## 2025-08-26 RX ADMIN — APIXABAN 5 MG: 5 TABLET, FILM COATED ORAL at 21:21

## 2025-08-26 RX ADMIN — LEVETIRACETAM 750 MG: 250 TABLET, FILM COATED ORAL at 21:21

## 2025-08-26 RX ADMIN — APIXABAN 5 MG: 5 TABLET, FILM COATED ORAL at 08:33

## 2025-08-26 RX ADMIN — SACUBITRIL AND VALSARTAN 1 TABLET: 49; 51 TABLET, FILM COATED ORAL at 09:56

## 2025-08-26 RX ADMIN — ATORVASTATIN CALCIUM 40 MG: 40 TABLET, FILM COATED ORAL at 21:21

## 2025-08-26 ASSESSMENT — PAIN SCALES - GENERAL
PAINLEVEL_OUTOF10: 0

## 2025-08-27 VITALS
DIASTOLIC BLOOD PRESSURE: 98 MMHG | OXYGEN SATURATION: 98 % | SYSTOLIC BLOOD PRESSURE: 157 MMHG | HEIGHT: 67 IN | BODY MASS INDEX: 28.56 KG/M2 | WEIGHT: 182 LBS | TEMPERATURE: 98 F | RESPIRATION RATE: 21 BRPM | HEART RATE: 77 BPM

## 2025-08-27 LAB
BACTERIA SPEC CULT: NORMAL
SERVICE CMNT-IMP: NORMAL

## 2025-08-27 PROCEDURE — 6370000000 HC RX 637 (ALT 250 FOR IP): Performed by: INTERNAL MEDICINE

## 2025-08-27 PROCEDURE — 6370000000 HC RX 637 (ALT 250 FOR IP): Performed by: STUDENT IN AN ORGANIZED HEALTH CARE EDUCATION/TRAINING PROGRAM

## 2025-08-27 PROCEDURE — 94761 N-INVAS EAR/PLS OXIMETRY MLT: CPT

## 2025-08-27 PROCEDURE — 99239 HOSP IP/OBS DSCHRG MGMT >30: CPT | Performed by: STUDENT IN AN ORGANIZED HEALTH CARE EDUCATION/TRAINING PROGRAM

## 2025-08-27 RX ADMIN — PANTOPRAZOLE SODIUM 40 MG: 40 TABLET, DELAYED RELEASE ORAL at 08:29

## 2025-08-27 RX ADMIN — CARVEDILOL 12.5 MG: 12.5 TABLET, FILM COATED ORAL at 08:28

## 2025-08-27 RX ADMIN — Medication 400 MG: at 08:29

## 2025-08-27 RX ADMIN — APIXABAN 5 MG: 5 TABLET, FILM COATED ORAL at 08:29

## 2025-08-27 RX ADMIN — LEVETIRACETAM 750 MG: 250 TABLET, FILM COATED ORAL at 08:29

## 2025-08-27 RX ADMIN — FUROSEMIDE 40 MG: 20 TABLET ORAL at 08:28

## 2025-08-27 RX ADMIN — SACUBITRIL AND VALSARTAN 1 TABLET: 49; 51 TABLET, FILM COATED ORAL at 08:29

## 2025-08-27 ASSESSMENT — PAIN SCALES - GENERAL
PAINLEVEL_OUTOF10: 0
PAINLEVEL_OUTOF10: 0

## 2025-08-28 LAB
BACTERIA SPEC CULT: NORMAL
SERVICE CMNT-IMP: NORMAL

## 2025-08-31 LAB
BACTERIA SPEC CULT: NORMAL
SERVICE CMNT-IMP: NORMAL